# Patient Record
Sex: MALE | Race: WHITE | Employment: OTHER | URBAN - METROPOLITAN AREA
[De-identification: names, ages, dates, MRNs, and addresses within clinical notes are randomized per-mention and may not be internally consistent; named-entity substitution may affect disease eponyms.]

---

## 2019-01-01 ENCOUNTER — HOSPITAL ENCOUNTER (OUTPATIENT)
Facility: CLINIC | Age: 84
Setting detail: OBSERVATION
Discharge: NURSING, REHAB OR LONG-TERM CARE | DRG: 556 | End: 2020-01-03
Attending: EMERGENCY MEDICINE | Admitting: INTERNAL MEDICINE
Payer: COMMERCIAL

## 2019-01-01 ENCOUNTER — OFFICE VISIT (OUTPATIENT)
Dept: CARDIOLOGY | Facility: CLINIC | Age: 84
End: 2019-01-01
Attending: INTERNAL MEDICINE
Payer: COMMERCIAL

## 2019-01-01 ENCOUNTER — APPOINTMENT (OUTPATIENT)
Dept: ULTRASOUND IMAGING | Facility: CLINIC | Age: 84
DRG: 556 | End: 2019-01-01
Attending: NURSE PRACTITIONER
Payer: COMMERCIAL

## 2019-01-01 ENCOUNTER — TELEPHONE (OUTPATIENT)
Dept: CARDIOLOGY | Facility: CLINIC | Age: 84
End: 2019-01-01

## 2019-01-01 ENCOUNTER — APPOINTMENT (OUTPATIENT)
Dept: PHYSICAL THERAPY | Facility: CLINIC | Age: 84
DRG: 308 | End: 2019-01-01
Payer: COMMERCIAL

## 2019-01-01 ENCOUNTER — CARE COORDINATION (OUTPATIENT)
Dept: CARDIOLOGY | Facility: CLINIC | Age: 84
End: 2019-01-01

## 2019-01-01 ENCOUNTER — OFFICE VISIT (OUTPATIENT)
Dept: CARDIOLOGY | Facility: CLINIC | Age: 84
End: 2019-01-01
Payer: COMMERCIAL

## 2019-01-01 ENCOUNTER — HOSPITAL ENCOUNTER (EMERGENCY)
Facility: CLINIC | Age: 84
Discharge: HOME | DRG: 556 | End: 2019-12-29
Attending: EMERGENCY MEDICINE | Admitting: EMERGENCY MEDICINE
Payer: COMMERCIAL

## 2019-01-01 ENCOUNTER — NURSING HOME VISIT (OUTPATIENT)
Dept: GERIATRICS | Facility: CLINIC | Age: 84
End: 2019-01-01
Payer: COMMERCIAL

## 2019-01-01 ENCOUNTER — TRANSFERRED RECORDS (OUTPATIENT)
Dept: HEALTH INFORMATION MANAGEMENT | Facility: CLINIC | Age: 84
End: 2019-01-01

## 2019-01-01 ENCOUNTER — APPOINTMENT (OUTPATIENT)
Dept: MRI IMAGING | Facility: CLINIC | Age: 84
DRG: 556 | End: 2019-01-01
Attending: NURSE PRACTITIONER
Payer: COMMERCIAL

## 2019-01-01 ENCOUNTER — APPOINTMENT (OUTPATIENT)
Dept: GENERAL RADIOLOGY | Facility: CLINIC | Age: 84
DRG: 556 | End: 2019-01-01
Attending: EMERGENCY MEDICINE
Payer: COMMERCIAL

## 2019-01-01 ENCOUNTER — HOSPITAL ENCOUNTER (INPATIENT)
Facility: CLINIC | Age: 84
LOS: 3 days | Discharge: NURSING, REHAB OR LONG-TERM CARE | DRG: 308 | End: 2019-04-14
Attending: EMERGENCY MEDICINE | Admitting: INTERNAL MEDICINE
Payer: COMMERCIAL

## 2019-01-01 ENCOUNTER — APPOINTMENT (OUTPATIENT)
Dept: CARDIOLOGY | Facility: CLINIC | Age: 84
DRG: 308 | End: 2019-01-01
Attending: INTERNAL MEDICINE
Payer: COMMERCIAL

## 2019-01-01 ENCOUNTER — DISCHARGE SUMMARY NURSING HOME (OUTPATIENT)
Dept: GERIATRICS | Facility: CLINIC | Age: 84
End: 2019-01-01
Payer: COMMERCIAL

## 2019-01-01 ENCOUNTER — APPOINTMENT (OUTPATIENT)
Dept: PHYSICAL THERAPY | Facility: CLINIC | Age: 84
DRG: 308 | End: 2019-01-01
Attending: INTERNAL MEDICINE
Payer: COMMERCIAL

## 2019-01-01 ENCOUNTER — APPOINTMENT (OUTPATIENT)
Dept: CT IMAGING | Facility: CLINIC | Age: 84
DRG: 556 | End: 2019-01-01
Attending: EMERGENCY MEDICINE
Payer: COMMERCIAL

## 2019-01-01 ENCOUNTER — APPOINTMENT (OUTPATIENT)
Dept: PHYSICAL THERAPY | Facility: CLINIC | Age: 84
DRG: 556 | End: 2019-01-01
Payer: COMMERCIAL

## 2019-01-01 ENCOUNTER — APPOINTMENT (OUTPATIENT)
Dept: GENERAL RADIOLOGY | Facility: CLINIC | Age: 84
DRG: 308 | End: 2019-01-01
Attending: INTERNAL MEDICINE
Payer: COMMERCIAL

## 2019-01-01 ENCOUNTER — HOSPITAL ENCOUNTER (EMERGENCY)
Facility: CLINIC | Age: 84
Discharge: HOME | DRG: 556 | End: 2019-12-30
Admitting: PHYSICIAN ASSISTANT
Payer: COMMERCIAL

## 2019-01-01 ENCOUNTER — OFFICE VISIT (OUTPATIENT)
Dept: CARDIOLOGY | Facility: CLINIC | Age: 84
End: 2019-01-01
Attending: NURSE PRACTITIONER
Payer: COMMERCIAL

## 2019-01-01 ENCOUNTER — APPOINTMENT (OUTPATIENT)
Dept: OCCUPATIONAL THERAPY | Facility: CLINIC | Age: 84
DRG: 308 | End: 2019-01-01
Attending: INTERNAL MEDICINE
Payer: COMMERCIAL

## 2019-01-01 ENCOUNTER — APPOINTMENT (OUTPATIENT)
Dept: GENERAL RADIOLOGY | Facility: CLINIC | Age: 84
DRG: 308 | End: 2019-01-01
Attending: EMERGENCY MEDICINE
Payer: COMMERCIAL

## 2019-01-01 ENCOUNTER — APPOINTMENT (OUTPATIENT)
Dept: GENERAL RADIOLOGY | Facility: CLINIC | Age: 84
DRG: 556 | End: 2019-01-01
Attending: PHYSICIAN ASSISTANT
Payer: COMMERCIAL

## 2019-01-01 ENCOUNTER — APPOINTMENT (OUTPATIENT)
Dept: OCCUPATIONAL THERAPY | Facility: CLINIC | Age: 84
DRG: 308 | End: 2019-01-01
Payer: COMMERCIAL

## 2019-01-01 ENCOUNTER — HOSPITAL ENCOUNTER (EMERGENCY)
Facility: CLINIC | Age: 84
Discharge: HOME | End: 2019-01-28
Attending: NURSE PRACTITIONER | Admitting: NURSE PRACTITIONER
Payer: COMMERCIAL

## 2019-01-01 VITALS — DIASTOLIC BLOOD PRESSURE: 92 MMHG | OXYGEN SATURATION: 94 % | TEMPERATURE: 98.7 F | SYSTOLIC BLOOD PRESSURE: 138 MMHG

## 2019-01-01 VITALS
DIASTOLIC BLOOD PRESSURE: 64 MMHG | WEIGHT: 183.3 LBS | OXYGEN SATURATION: 98 % | HEIGHT: 70 IN | HEART RATE: 62 BPM | SYSTOLIC BLOOD PRESSURE: 146 MMHG | BODY MASS INDEX: 26.24 KG/M2

## 2019-01-01 VITALS
HEART RATE: 88 BPM | OXYGEN SATURATION: 98 % | TEMPERATURE: 98.1 F | WEIGHT: 196 LBS | DIASTOLIC BLOOD PRESSURE: 85 MMHG | RESPIRATION RATE: 16 BRPM | SYSTOLIC BLOOD PRESSURE: 147 MMHG | BODY MASS INDEX: 28.12 KG/M2

## 2019-01-01 VITALS
HEIGHT: 70 IN | OXYGEN SATURATION: 97 % | DIASTOLIC BLOOD PRESSURE: 78 MMHG | SYSTOLIC BLOOD PRESSURE: 128 MMHG | HEART RATE: 80 BPM | TEMPERATURE: 98.1 F | RESPIRATION RATE: 20 BRPM | BODY MASS INDEX: 27 KG/M2 | WEIGHT: 188.6 LBS

## 2019-01-01 VITALS
SYSTOLIC BLOOD PRESSURE: 119 MMHG | BODY MASS INDEX: 25.72 KG/M2 | HEART RATE: 63 BPM | WEIGHT: 183.7 LBS | HEIGHT: 71 IN | DIASTOLIC BLOOD PRESSURE: 66 MMHG

## 2019-01-01 VITALS
OXYGEN SATURATION: 96 % | WEIGHT: 195 LBS | TEMPERATURE: 97.6 F | SYSTOLIC BLOOD PRESSURE: 131 MMHG | RESPIRATION RATE: 18 BRPM | BODY MASS INDEX: 27.92 KG/M2 | HEART RATE: 53 BPM | HEIGHT: 70 IN | DIASTOLIC BLOOD PRESSURE: 79 MMHG

## 2019-01-01 VITALS
BODY MASS INDEX: 26.73 KG/M2 | SYSTOLIC BLOOD PRESSURE: 113 MMHG | HEART RATE: 59 BPM | TEMPERATURE: 96.7 F | OXYGEN SATURATION: 97 % | WEIGHT: 186.3 LBS | RESPIRATION RATE: 18 BRPM | DIASTOLIC BLOOD PRESSURE: 59 MMHG

## 2019-01-01 VITALS
SYSTOLIC BLOOD PRESSURE: 138 MMHG | DIASTOLIC BLOOD PRESSURE: 76 MMHG | WEIGHT: 188 LBS | OXYGEN SATURATION: 97 % | HEIGHT: 70 IN | HEART RATE: 80 BPM | BODY MASS INDEX: 26.92 KG/M2

## 2019-01-01 VITALS
HEIGHT: 70 IN | SYSTOLIC BLOOD PRESSURE: 120 MMHG | OXYGEN SATURATION: 97 % | DIASTOLIC BLOOD PRESSURE: 72 MMHG | BODY MASS INDEX: 27.35 KG/M2 | WEIGHT: 191 LBS | HEART RATE: 66 BPM

## 2019-01-01 VITALS
BODY MASS INDEX: 26.98 KG/M2 | SYSTOLIC BLOOD PRESSURE: 123 MMHG | TEMPERATURE: 97.7 F | WEIGHT: 188 LBS | OXYGEN SATURATION: 98 % | RESPIRATION RATE: 20 BRPM | HEART RATE: 68 BPM | DIASTOLIC BLOOD PRESSURE: 67 MMHG

## 2019-01-01 VITALS
DIASTOLIC BLOOD PRESSURE: 59 MMHG | HEIGHT: 70 IN | HEART RATE: 60 BPM | WEIGHT: 186 LBS | BODY MASS INDEX: 26.63 KG/M2 | SYSTOLIC BLOOD PRESSURE: 91 MMHG

## 2019-01-01 VITALS — DIASTOLIC BLOOD PRESSURE: 76 MMHG | HEART RATE: 99 BPM | SYSTOLIC BLOOD PRESSURE: 118 MMHG

## 2019-01-01 DIAGNOSIS — M79.672 PAIN IN LEFT FOOT: Primary | ICD-10-CM

## 2019-01-01 DIAGNOSIS — I48.21 PERMANENT ATRIAL FIBRILLATION (H): ICD-10-CM

## 2019-01-01 DIAGNOSIS — I50.30 HEART FAILURE WITH PRESERVED EJECTION FRACTION, UNSPECIFIED HF CHRONICITY (H): ICD-10-CM

## 2019-01-01 DIAGNOSIS — S80.12XA TRAUMATIC ECCHYMOSIS OF LEFT LOWER LEG, INITIAL ENCOUNTER: ICD-10-CM

## 2019-01-01 DIAGNOSIS — I48.91 NEW ONSET ATRIAL FIBRILLATION (H): ICD-10-CM

## 2019-01-01 DIAGNOSIS — I50.30 (HFPEF) HEART FAILURE WITH PRESERVED EJECTION FRACTION (H): Primary | ICD-10-CM

## 2019-01-01 DIAGNOSIS — I35.0 AORTIC VALVE STENOSIS, ETIOLOGY OF CARDIAC VALVE DISEASE UNSPECIFIED: ICD-10-CM

## 2019-01-01 DIAGNOSIS — N39.0 URINARY TRACT INFECTION WITHOUT HEMATURIA, SITE UNSPECIFIED: ICD-10-CM

## 2019-01-01 DIAGNOSIS — S70.12XA HEMATOMA OF LEFT THIGH, INITIAL ENCOUNTER: ICD-10-CM

## 2019-01-01 DIAGNOSIS — E11.8 TYPE 2 DIABETES MELLITUS WITH COMPLICATION, WITHOUT LONG-TERM CURRENT USE OF INSULIN (H): ICD-10-CM

## 2019-01-01 DIAGNOSIS — I50.30 (HFPEF) HEART FAILURE WITH PRESERVED EJECTION FRACTION (H): ICD-10-CM

## 2019-01-01 DIAGNOSIS — I48.19 PERSISTENT ATRIAL FIBRILLATION (H): Primary | ICD-10-CM

## 2019-01-01 DIAGNOSIS — I48.19 PERSISTENT ATRIAL FIBRILLATION (H): ICD-10-CM

## 2019-01-01 DIAGNOSIS — I50.32 CHRONIC HEART FAILURE WITH PRESERVED EJECTION FRACTION (H): Primary | ICD-10-CM

## 2019-01-01 DIAGNOSIS — I63.9 CEREBROVASCULAR ACCIDENT (CVA), UNSPECIFIED MECHANISM (H): ICD-10-CM

## 2019-01-01 DIAGNOSIS — I50.31 ACUTE DIASTOLIC CONGESTIVE HEART FAILURE (H): Primary | ICD-10-CM

## 2019-01-01 DIAGNOSIS — I50.9 CONGESTIVE HEART FAILURE, UNSPECIFIED HF CHRONICITY, UNSPECIFIED HEART FAILURE TYPE (H): ICD-10-CM

## 2019-01-01 DIAGNOSIS — I50.9 CONGESTIVE HEART FAILURE, UNSPECIFIED HF CHRONICITY, UNSPECIFIED HEART FAILURE TYPE (H): Primary | ICD-10-CM

## 2019-01-01 DIAGNOSIS — N40.1 BENIGN PROSTATIC HYPERPLASIA WITH NOCTURIA: Primary | ICD-10-CM

## 2019-01-01 DIAGNOSIS — I35.0 NONRHEUMATIC AORTIC VALVE STENOSIS: Primary | ICD-10-CM

## 2019-01-01 DIAGNOSIS — R31.9 URINARY TRACT INFECTION WITH HEMATURIA, SITE UNSPECIFIED: ICD-10-CM

## 2019-01-01 DIAGNOSIS — E78.5 HYPERLIPIDEMIA, UNSPECIFIED HYPERLIPIDEMIA TYPE: ICD-10-CM

## 2019-01-01 DIAGNOSIS — I48.20 CHRONIC ATRIAL FIBRILLATION (H): ICD-10-CM

## 2019-01-01 DIAGNOSIS — M25.572 PAIN IN JOINT INVOLVING ANKLE AND FOOT, LEFT: ICD-10-CM

## 2019-01-01 DIAGNOSIS — R35.1 BENIGN PROSTATIC HYPERPLASIA WITH NOCTURIA: Primary | ICD-10-CM
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
## 2019-01-01 DIAGNOSIS — S61.211A LACERATION OF LEFT INDEX FINGER WITHOUT FOREIGN BODY WITHOUT DAMAGE TO NAIL, INITIAL ENCOUNTER: ICD-10-CM

## 2019-01-01 DIAGNOSIS — R53.81 PHYSICAL DECONDITIONING: ICD-10-CM

## 2019-01-01 DIAGNOSIS — I10 ESSENTIAL HYPERTENSION: ICD-10-CM

## 2019-01-01 DIAGNOSIS — I50.31 ACUTE DIASTOLIC CONGESTIVE HEART FAILURE (H): ICD-10-CM

## 2019-01-01 DIAGNOSIS — S90.32XA CONTUSION OF LEFT FOOT, INITIAL ENCOUNTER: ICD-10-CM

## 2019-01-01 DIAGNOSIS — N39.0 URINARY TRACT INFECTION WITH HEMATURIA, SITE UNSPECIFIED: ICD-10-CM

## 2019-01-01 DIAGNOSIS — I48.91 ATRIAL FIBRILLATION, NEW ONSET (H): ICD-10-CM

## 2019-01-01 DIAGNOSIS — S70.12XA TRAUMATIC ECCHYMOSIS OF LEFT THIGH, INITIAL ENCOUNTER: ICD-10-CM

## 2019-01-01 DIAGNOSIS — M79.672 LEFT FOOT PAIN: ICD-10-CM

## 2019-01-01 DIAGNOSIS — E11.00 TYPE 2 DIABETES MELLITUS WITH HYPEROSMOLARITY WITHOUT COMA, WITHOUT LONG-TERM CURRENT USE OF INSULIN (H): ICD-10-CM

## 2019-01-01 LAB
ABO + RH BLD: NORMAL
ABO + RH BLD: NORMAL
ALBUMIN SERPL-MCNC: 3.8 G/DL (ref 3.5–5)
ALBUMIN UR-MCNC: 100 MG/DL
ALP SERPL-CCNC: 94 U/L (ref 40–150)
ALT SERPL-CCNC: 12 U/L (ref 0–55)
ANION GAP SERPL CALCULATED.3IONS-SCNC: 10 MMOL/L (ref 3–14)
ANION GAP SERPL CALCULATED.3IONS-SCNC: 10 MMOL/L (ref 6–17)
ANION GAP SERPL CALCULATED.3IONS-SCNC: 11 MMOL/L (ref 3–14)
ANION GAP SERPL CALCULATED.3IONS-SCNC: 13 MMOL/L (ref 7–16)
ANION GAP SERPL CALCULATED.3IONS-SCNC: 14.4 MMOL/L (ref 6–17)
ANION GAP SERPL CALCULATED.3IONS-SCNC: 15 MMOL/L (ref 6–17)
ANION GAP SERPL CALCULATED.3IONS-SCNC: 15 MMOL/L (ref 7–16)
ANION GAP SERPL CALCULATED.3IONS-SCNC: 15.8 MMOL/L (ref 6–17)
ANION GAP SERPL CALCULATED.3IONS-SCNC: 6 MMOL/L (ref 3–14)
ANION GAP SERPL CALCULATED.3IONS-SCNC: 6 MMOL/L (ref 3–14)
ANION GAP SERPL CALCULATED.3IONS-SCNC: 7 MMOL/L (ref 3–14)
ANION GAP SERPL CALCULATED.3IONS-SCNC: 8 MMOL/L (ref 3–14)
ANION GAP SERPL CALCULATED.3IONS-SCNC: 9 MMOL/L (ref 3–14)
APPEARANCE UR: ABNORMAL
AST SERPL-CCNC: 12 U/L (ref 10–40)
BACTERIA #/AREA URNS HPF: ABNORMAL /HPF
BACTERIA SPEC CULT: ABNORMAL
BACTERIA SPEC CULT: ABNORMAL
BASOPHILS # BLD AUTO: 0 10E9/L (ref 0–0.2)
BASOPHILS # BLD AUTO: 0.1 10E9/L (ref 0–0.2)
BASOPHILS NFR BLD AUTO: 0.1 %
BASOPHILS NFR BLD AUTO: 0.3 %
BASOPHILS NFR BLD AUTO: 0.5 %
BASOPHILS NFR BLD AUTO: 0.9 %
BILIRUB SERPL-MCNC: 0.7 MG/DL (ref 0.2–1.2)
BILIRUB UR QL STRIP: NEGATIVE
BLD GP AB SCN SERPL QL: NORMAL
BLOOD BANK CMNT PATIENT-IMP: NORMAL
BUN SERPL-MCNC: 23 MG/DL (ref 7–30)
BUN SERPL-MCNC: 24 MG/DL (ref 7–30)
BUN SERPL-MCNC: 25 MG/DL (ref 7–30)
BUN SERPL-MCNC: 25 MG/DL (ref 7–30)
BUN SERPL-MCNC: 26 MG/DL (ref 7–30)
BUN SERPL-MCNC: 27 MG/DL (ref 7–30)
BUN SERPL-MCNC: 28 MG/DL (ref 7–30)
BUN SERPL-MCNC: 31 MG/DL (ref 7–26)
BUN SERPL-MCNC: 31 MG/DL (ref 7–30)
BUN SERPL-MCNC: 32 MG/DL (ref 7–26)
BUN SERPL-MCNC: 35 MG/DL (ref 7–30)
BUN SERPL-MCNC: 36 MG/DL (ref 7–30)
BUN SERPL-MCNC: 44 MG/DL (ref 7–30)
CALCIUM SERPL-MCNC: 10 MG/DL (ref 8.5–10.5)
CALCIUM SERPL-MCNC: 8.3 MG/DL (ref 8.5–10.1)
CALCIUM SERPL-MCNC: 8.3 MG/DL (ref 8.5–10.1)
CALCIUM SERPL-MCNC: 8.6 MG/DL (ref 8.5–10.1)
CALCIUM SERPL-MCNC: 8.9 MG/DL (ref 8.5–10.1)
CALCIUM SERPL-MCNC: 8.9 MG/DL (ref 8.5–10.1)
CALCIUM SERPL-MCNC: 9.1 MG/DL (ref 8.5–10.1)
CALCIUM SERPL-MCNC: 9.4 MG/DL (ref 8.5–10.1)
CALCIUM SERPL-MCNC: 9.4 MG/DL (ref 8.5–10.5)
CALCIUM SERPL-MCNC: 9.5 MG/DL (ref 8.5–10.1)
CALCIUM SERPL-MCNC: 9.6 MG/DL (ref 8.5–10.1)
CALCIUM SERPL-MCNC: 9.7 MG/DL (ref 8.4–10.4)
CALCIUM SERPL-MCNC: 9.9 MG/DL (ref 8.4–10.4)
CHLORIDE SERPL-SCNC: 102 MMOL/L (ref 98–107)
CHLORIDE SERPL-SCNC: 102 MMOL/L (ref 98–107)
CHLORIDE SERPL-SCNC: 105 MMOL/L (ref 94–109)
CHLORIDE SERPL-SCNC: 105 MMOL/L (ref 94–109)
CHLORIDE SERPL-SCNC: 106 MMOL/L (ref 94–109)
CHLORIDE SERPL-SCNC: 107 MMOL/L (ref 94–109)
CHLORIDE SERPL-SCNC: 108 MMOL/L (ref 94–109)
CHLORIDE SERPL-SCNC: 108 MMOL/L (ref 94–109)
CHLORIDE SERPL-SCNC: 109 MMOL/L (ref 94–109)
CHLORIDE SERPL-SCNC: 110 MMOL/L (ref 94–109)
CHLORIDE SERPL-SCNC: 112 MMOL/L (ref 94–109)
CHLORIDE SERPL-SCNC: 98 MMOL/L (ref 98–107)
CHLORIDE SERPL-SCNC: 98 MMOL/L (ref 98–107)
CHLORIDE SERPLBLD-SCNC: 106 MMOL/L (ref 98–109)
CHLORIDE SERPLBLD-SCNC: 98 MMOL/L (ref 98–109)
CK SERPL-CCNC: 1080 U/L (ref 30–300)
CK SERPL-CCNC: 77 U/L (ref 30–300)
CO2 SERPL-SCNC: 21 MMOL/L (ref 20–32)
CO2 SERPL-SCNC: 22 MMOL/L (ref 20–29)
CO2 SERPL-SCNC: 23 MMOL/L (ref 20–32)
CO2 SERPL-SCNC: 24 MMOL/L (ref 20–32)
CO2 SERPL-SCNC: 25 MMOL/L (ref 20–32)
CO2 SERPL-SCNC: 25 MMOL/L (ref 23–29)
CO2 SERPL-SCNC: 27 MMOL/L (ref 23–29)
CO2 SERPL-SCNC: 27 MMOL/L (ref 23–29)
CO2 SERPL-SCNC: 28 MMOL/L (ref 20–32)
CO2 SERPL-SCNC: 28 MMOL/L (ref 20–32)
CO2 SERPL-SCNC: 29 MMOL/L (ref 20–32)
CO2 SERPL-SCNC: 30 MMOL/L (ref 20–29)
CO2 SERPL-SCNC: 33 MMOL/L (ref 23–29)
COLOR UR AUTO: YELLOW
CREAT SERPL-MCNC: 0.99 MG/DL (ref 0.66–1.25)
CREAT SERPL-MCNC: 1.03 MG/DL (ref 0.66–1.25)
CREAT SERPL-MCNC: 1.07 MG/DL (ref 0.66–1.25)
CREAT SERPL-MCNC: 1.07 MG/DL (ref 0.66–1.25)
CREAT SERPL-MCNC: 1.08 MG/DL (ref 0.73–1.18)
CREAT SERPL-MCNC: 1.16 MG/DL (ref 0.66–1.25)
CREAT SERPL-MCNC: 1.2 MG/DL (ref 0.66–1.25)
CREAT SERPL-MCNC: 1.2 MG/DL (ref 0.66–1.25)
CREAT SERPL-MCNC: 1.26 MG/DL (ref 0.66–1.25)
CREAT SERPL-MCNC: 1.29 MG/DL (ref 0.7–1.3)
CREAT SERPL-MCNC: 1.29 MG/DL (ref 0.7–1.3)
CREAT SERPL-MCNC: 1.33 MG/DL (ref 0.73–1.18)
CREAT SERPL-MCNC: 1.37 MG/DL (ref 0.66–1.25)
CREAT SERPL-MCNC: 1.41 MG/DL (ref 0.7–1.3)
CREAT SERPL-MCNC: 1.56 MG/DL (ref 0.7–1.3)
DIFFERENTIAL METHOD BLD: ABNORMAL
DIFFERENTIAL: ABNORMAL
EOSINOPHIL # BLD AUTO: 0 10E9/L (ref 0–0.7)
EOSINOPHIL # BLD AUTO: 0 10E9/L (ref 0–0.7)
EOSINOPHIL # BLD AUTO: 0.1 10E9/L (ref 0–0.7)
EOSINOPHIL # BLD AUTO: 0.2 10E9/L (ref 0–0.7)
EOSINOPHIL NFR BLD AUTO: 0 %
EOSINOPHIL NFR BLD AUTO: 0.1 %
EOSINOPHIL NFR BLD AUTO: 1.8 %
EOSINOPHIL NFR BLD AUTO: 2.9 %
ERYTHROCYTE [DISTWIDTH] IN BLOOD BY AUTOMATED COUNT: 14.5 % (ref 10–15)
ERYTHROCYTE [DISTWIDTH] IN BLOOD BY AUTOMATED COUNT: 14.8 % (ref 11.9–15.5)
ERYTHROCYTE [DISTWIDTH] IN BLOOD BY AUTOMATED COUNT: 15.5 % (ref 10–15)
ERYTHROCYTE [DISTWIDTH] IN BLOOD BY AUTOMATED COUNT: 16 % (ref 10–15)
ERYTHROCYTE [DISTWIDTH] IN BLOOD BY AUTOMATED COUNT: 16.4 % (ref 10–15)
GFR SERPL CREATININE-BSD FRML MDRD: 42 ML/MIN/{1.73_M2}
GFR SERPL CREATININE-BSD FRML MDRD: 46 ML/MIN/{1.73_M2}
GFR SERPL CREATININE-BSD FRML MDRD: 48 ML/MIN/1.73M2
GFR SERPL CREATININE-BSD FRML MDRD: 48 ML/MIN/{1.73_M2}
GFR SERPL CREATININE-BSD FRML MDRD: 51 ML/MIN/{1.73_M2}
GFR SERPL CREATININE-BSD FRML MDRD: 53 ML/MIN/{1.73_M2}
GFR SERPL CREATININE-BSD FRML MDRD: 53 ML/MIN/{1.73_M2}
GFR SERPL CREATININE-BSD FRML MDRD: 54 ML/MIN/{1.73_M2}
GFR SERPL CREATININE-BSD FRML MDRD: 54 ML/MIN/{1.73_M2}
GFR SERPL CREATININE-BSD FRML MDRD: 57 ML/MIN/{1.73_M2}
GFR SERPL CREATININE-BSD FRML MDRD: 62 ML/MIN/{1.73_M2}
GFR SERPL CREATININE-BSD FRML MDRD: 62 ML/MIN/{1.73_M2}
GFR SERPL CREATININE-BSD FRML MDRD: 65 ML/MIN/{1.73_M2}
GFR SERPL CREATININE-BSD FRML MDRD: 69 ML/MIN/{1.73_M2}
GFR SERPL CREATININE-BSD FRML MDRD: >60 ML/MIN/1.73M2
GLUCOSE BLDC GLUCOMTR-MCNC: 122 MG/DL (ref 70–99)
GLUCOSE BLDC GLUCOMTR-MCNC: 125 MG/DL (ref 70–99)
GLUCOSE BLDC GLUCOMTR-MCNC: 133 MG/DL (ref 70–99)
GLUCOSE BLDC GLUCOMTR-MCNC: 141 MG/DL (ref 70–99)
GLUCOSE BLDC GLUCOMTR-MCNC: 148 MG/DL (ref 70–99)
GLUCOSE BLDC GLUCOMTR-MCNC: 152 MG/DL (ref 70–99)
GLUCOSE BLDC GLUCOMTR-MCNC: 152 MG/DL (ref 70–99)
GLUCOSE BLDC GLUCOMTR-MCNC: 157 MG/DL (ref 70–99)
GLUCOSE BLDC GLUCOMTR-MCNC: 163 MG/DL (ref 70–99)
GLUCOSE BLDC GLUCOMTR-MCNC: 175 MG/DL (ref 70–99)
GLUCOSE BLDC GLUCOMTR-MCNC: 179 MG/DL (ref 70–99)
GLUCOSE BLDC GLUCOMTR-MCNC: 182 MG/DL (ref 70–99)
GLUCOSE BLDC GLUCOMTR-MCNC: 214 MG/DL (ref 70–99)
GLUCOSE BLDC GLUCOMTR-MCNC: 287 MG/DL (ref 70–99)
GLUCOSE SERPL-MCNC: 103 MG/DL (ref 70–99)
GLUCOSE SERPL-MCNC: 113 MG/DL (ref 70–100)
GLUCOSE SERPL-MCNC: 115 MG/DL (ref 70–105)
GLUCOSE SERPL-MCNC: 117 MG/DL (ref 70–99)
GLUCOSE SERPL-MCNC: 122 MG/DL (ref 70–105)
GLUCOSE SERPL-MCNC: 135 MG/DL (ref 70–100)
GLUCOSE SERPL-MCNC: 143 MG/DL (ref 70–99)
GLUCOSE SERPL-MCNC: 147 MG/DL (ref 70–99)
GLUCOSE SERPL-MCNC: 152 MG/DL (ref 70–99)
GLUCOSE SERPL-MCNC: 152 MG/DL (ref 70–99)
GLUCOSE SERPL-MCNC: 160 MG/DL (ref 70–99)
GLUCOSE SERPL-MCNC: 165 MG/DL (ref 70–99)
GLUCOSE SERPL-MCNC: 167 MG/DL (ref 70–99)
GLUCOSE SERPL-MCNC: 217 MG/DL (ref 70–105)
GLUCOSE SERPL-MCNC: 281 MG/DL (ref 70–105)
GLUCOSE UR STRIP-MCNC: NEGATIVE MG/DL
HBA1C MFR BLD: 6.3 % (ref 0–5.7)
HBA1C MFR BLD: 6.6 % (ref 0–5.6)
HBA1C MFR BLD: 8.3 % (ref 0–5.6)
HCT VFR BLD AUTO: 31.8 % (ref 40–53)
HCT VFR BLD AUTO: 32.4 % (ref 40–53)
HCT VFR BLD AUTO: 36.3 % (ref 40–53)
HCT VFR BLD AUTO: 36.9 % (ref 38.8–50)
HCT VFR BLD AUTO: 38.4 % (ref 40–53)
HEMOGLOBIN: 11.7 G/DL (ref 13.5–17.5)
HGB BLD-MCNC: 10.1 G/DL (ref 13.3–17.7)
HGB BLD-MCNC: 10.5 G/DL (ref 13.3–17.7)
HGB BLD-MCNC: 11.9 G/DL (ref 13.3–17.7)
HGB BLD-MCNC: 12.8 G/DL (ref 13.3–17.7)
HGB UR QL STRIP: ABNORMAL
IMM GRANULOCYTES # BLD: 0 10E9/L (ref 0–0.4)
IMM GRANULOCYTES # BLD: 0.1 10E9/L (ref 0–0.4)
IMM GRANULOCYTES NFR BLD: 0 %
IMM GRANULOCYTES NFR BLD: 0.2 %
IMM GRANULOCYTES NFR BLD: 0.2 %
IMM GRANULOCYTES NFR BLD: 0.3 %
INR PPP: 1.6 (ref 0.86–1.14)
INTERPRETATION ECG - MUSE: NORMAL
KETONES UR STRIP-MCNC: NEGATIVE MG/DL
LEUKOCYTE ESTERASE UR QL STRIP: ABNORMAL
LYMPHOCYTES # BLD AUTO: 0.3 10E9/L (ref 0.8–5.3)
LYMPHOCYTES # BLD AUTO: 0.8 10E9/L (ref 0.8–5.3)
LYMPHOCYTES # BLD AUTO: 0.8 10E9/L (ref 0.8–5.3)
LYMPHOCYTES # BLD AUTO: 0.9 10E9/L (ref 0.8–5.3)
LYMPHOCYTES NFR BLD AUTO: 1.4 %
LYMPHOCYTES NFR BLD AUTO: 13.2 %
LYMPHOCYTES NFR BLD AUTO: 16 %
LYMPHOCYTES NFR BLD AUTO: 6.9 %
Lab: ABNORMAL
MCH RBC QN AUTO: 30.2 PG (ref 26.5–33)
MCH RBC QN AUTO: 30.4 PG (ref 26.5–33)
MCH RBC QN AUTO: 31 PG (ref 26.5–33)
MCH RBC QN AUTO: 31.6 PG (ref 26.5–33)
MCH RBC QN AUTO: 31.7 PG (ref 27.6–33.3)
MCHC RBC AUTO-ENTMCNC: 31.7 G/DL (ref 31.5–35.2)
MCHC RBC AUTO-ENTMCNC: 31.8 G/DL (ref 31.5–36.5)
MCHC RBC AUTO-ENTMCNC: 32.4 G/DL (ref 31.5–36.5)
MCHC RBC AUTO-ENTMCNC: 32.8 G/DL (ref 31.5–36.5)
MCHC RBC AUTO-ENTMCNC: 33.3 G/DL (ref 31.5–36.5)
MCV RBC AUTO: 100 FL (ref 80–100)
MCV RBC AUTO: 91 FL (ref 78–100)
MCV RBC AUTO: 92 FL (ref 78–100)
MCV RBC AUTO: 98 FL (ref 78–100)
MCV RBC AUTO: 98 FL (ref 78–100)
MONOCYTES # BLD AUTO: 0.5 10E9/L (ref 0–1.3)
MONOCYTES # BLD AUTO: 0.6 10E9/L (ref 0–1.3)
MONOCYTES # BLD AUTO: 1 10E9/L (ref 0–1.3)
MONOCYTES # BLD AUTO: 1.1 10E9/L (ref 0–1.3)
MONOCYTES NFR BLD AUTO: 10 %
MONOCYTES NFR BLD AUTO: 10.2 %
MONOCYTES NFR BLD AUTO: 5.1 %
MONOCYTES NFR BLD AUTO: 8.1 %
NEUTROPHILS # BLD AUTO: 18.9 10E9/L (ref 1.6–8.3)
NEUTROPHILS # BLD AUTO: 4.1 10E9/L (ref 1.6–8.3)
NEUTROPHILS # BLD AUTO: 4.7 10E9/L (ref 1.6–8.3)
NEUTROPHILS # BLD AUTO: 9.2 10E9/L (ref 1.6–8.3)
NEUTROPHILS NFR BLD AUTO: 69.8 %
NEUTROPHILS NFR BLD AUTO: 76.4 %
NEUTROPHILS NFR BLD AUTO: 82.5 %
NEUTROPHILS NFR BLD AUTO: 93.1 %
NITRATE UR QL: POSITIVE
NRBC # BLD AUTO: 0 10*3/UL
NRBC BLD AUTO-RTO: 0 /100
NT-PROBNP SERPL-MCNC: 4987 PG/ML (ref 0–450)
NT-PROBNP SERPL-MCNC: ABNORMAL PG/ML (ref 0–1800)
PH UR STRIP: 5.5 PH (ref 5–7)
PLATELET # BLD AUTO: 161 10E9/L (ref 150–450)
PLATELET # BLD AUTO: 173 X10(9)/L (ref 150–450)
PLATELET # BLD AUTO: 178 10E9/L (ref 150–450)
PLATELET # BLD AUTO: 206 10E9/L (ref 150–450)
PLATELET # BLD AUTO: 215 10E9/L (ref 150–450)
PLATELET # BLD AUTO: 225 10E9/L (ref 150–450)
POTASSIUM SERPL-SCNC: 3.1 MMOL/L (ref 3.4–5.3)
POTASSIUM SERPL-SCNC: 3.5 MMOL/L (ref 3.4–5.3)
POTASSIUM SERPL-SCNC: 3.9 MMOL/L (ref 3.4–5.3)
POTASSIUM SERPL-SCNC: 4 MMOL/L (ref 3.5–5)
POTASSIUM SERPL-SCNC: 4 MMOL/L (ref 3.5–5.1)
POTASSIUM SERPL-SCNC: 4 MMOL/L (ref 3.5–5.1)
POTASSIUM SERPL-SCNC: 4.2 MMOL/L (ref 3.5–5.1)
POTASSIUM SERPL-SCNC: 4.3 MMOL/L (ref 3.4–5.3)
POTASSIUM SERPL-SCNC: 4.4 MMOL/L (ref 3.4–5.3)
POTASSIUM SERPL-SCNC: 4.4 MMOL/L (ref 3.4–5.3)
POTASSIUM SERPL-SCNC: 4.4 MMOL/L (ref 3.5–5.1)
POTASSIUM SERPL-SCNC: 4.5 MMOL/L (ref 3.4–5.3)
POTASSIUM SERPL-SCNC: 4.6 MMOL/L (ref 3.4–5.3)
POTASSIUM SERPL-SCNC: 4.6 MMOL/L (ref 3.4–5.3)
POTASSIUM SERPL-SCNC: 4.8 MMOL/L (ref 3.5–5.1)
PROT SERPL-MCNC: 6.1 G/DL (ref 6.4–8.3)
RBC # BLD AUTO: 3.26 10E12/L (ref 4.4–5.9)
RBC # BLD AUTO: 3.32 10E12/L (ref 4.4–5.9)
RBC # BLD AUTO: 3.69 (ref 4.32–5.72)
RBC # BLD AUTO: 3.94 10E12/L (ref 4.4–5.9)
RBC # BLD AUTO: 4.21 10E12/L (ref 4.4–5.9)
RBC #/AREA URNS AUTO: 11 /HPF (ref 0–2)
SODIUM SERPL-SCNC: 136 MMOL/L (ref 133–144)
SODIUM SERPL-SCNC: 136 MMOL/L (ref 136–145)
SODIUM SERPL-SCNC: 137 MMOL/L (ref 136–145)
SODIUM SERPL-SCNC: 138 MMOL/L (ref 136–145)
SODIUM SERPL-SCNC: 139 MMOL/L (ref 136–145)
SODIUM SERPL-SCNC: 140 MMOL/L (ref 133–144)
SODIUM SERPL-SCNC: 141 MMOL/L (ref 133–144)
SODIUM SERPL-SCNC: 141 MMOL/L (ref 136–145)
SODIUM SERPL-SCNC: 142 MMOL/L (ref 133–144)
SODIUM SERPL-SCNC: 142 MMOL/L (ref 133–144)
SODIUM SERPL-SCNC: 143 MMOL/L (ref 133–144)
SODIUM SERPL-SCNC: 143 MMOL/L (ref 133–144)
SODIUM SERPL-SCNC: 143 MMOL/L (ref 136–145)
SOURCE: ABNORMAL
SP GR UR STRIP: 1.01 (ref 1–1.03)
SPECIMEN EXP DATE BLD: NORMAL
SPECIMEN SOURCE: ABNORMAL
SQUAMOUS #/AREA URNS AUTO: <1 /HPF (ref 0–1)
TROPONIN I SERPL-MCNC: 0.09 UG/L (ref 0–0.04)
TROPONIN I SERPL-MCNC: 0.1 UG/L (ref 0–0.04)
TROPONIN I SERPL-MCNC: 0.11 UG/L (ref 0–0.04)
TROPONIN I SERPL-MCNC: 0.14 UG/L (ref 0–0.04)
TSH SERPL DL<=0.005 MIU/L-ACNC: 0.4 MU/L (ref 0.4–4)
UROBILINOGEN UR STRIP-MCNC: NORMAL MG/DL (ref 0–2)
WBC # BLD AUTO: 11.2 10E9/L (ref 4–11)
WBC # BLD AUTO: 20.3 10E9/L (ref 4–11)
WBC # BLD AUTO: 5 X10(9)/L (ref 3.5–10.5)
WBC # BLD AUTO: 5.8 10E9/L (ref 4–11)
WBC # BLD AUTO: 6.1 10E9/L (ref 4–11)
WBC #/AREA URNS AUTO: >182 /HPF (ref 0–5)
WBC CLUMPS #/AREA URNS HPF: PRESENT /HPF

## 2019-01-01 PROCEDURE — 00000146 ZZHCL STATISTIC GLUCOSE BY METER IP

## 2019-01-01 PROCEDURE — 97535 SELF CARE MNGMENT TRAINING: CPT | Mod: GO

## 2019-01-01 PROCEDURE — 86850 RBC ANTIBODY SCREEN: CPT | Performed by: EMERGENCY MEDICINE

## 2019-01-01 PROCEDURE — 97530 THERAPEUTIC ACTIVITIES: CPT | Mod: GO

## 2019-01-01 PROCEDURE — 99285 EMERGENCY DEPT VISIT HI MDM: CPT | Mod: 25

## 2019-01-01 PROCEDURE — 25000132 ZZH RX MED GY IP 250 OP 250 PS 637: Performed by: INTERNAL MEDICINE

## 2019-01-01 PROCEDURE — 99222 1ST HOSP IP/OBS MODERATE 55: CPT | Mod: 25 | Performed by: INTERNAL MEDICINE

## 2019-01-01 PROCEDURE — 21000001 ZZH R&B HEART CARE

## 2019-01-01 PROCEDURE — 36415 COLL VENOUS BLD VENIPUNCTURE: CPT | Performed by: NURSE PRACTITIONER

## 2019-01-01 PROCEDURE — 36415 COLL VENOUS BLD VENIPUNCTURE: CPT | Performed by: INTERNAL MEDICINE

## 2019-01-01 PROCEDURE — 73700 CT LOWER EXTREMITY W/O DYE: CPT | Mod: LT,XS

## 2019-01-01 PROCEDURE — 84484 ASSAY OF TROPONIN QUANT: CPT | Performed by: INTERNAL MEDICINE

## 2019-01-01 PROCEDURE — 85025 COMPLETE CBC W/AUTO DIFF WBC: CPT | Performed by: NURSE PRACTITIONER

## 2019-01-01 PROCEDURE — 80048 BASIC METABOLIC PNL TOTAL CA: CPT | Performed by: INTERNAL MEDICINE

## 2019-01-01 PROCEDURE — 73610 X-RAY EXAM OF ANKLE: CPT | Mod: LT

## 2019-01-01 PROCEDURE — 97162 PT EVAL MOD COMPLEX 30 MIN: CPT | Mod: GP

## 2019-01-01 PROCEDURE — 71046 X-RAY EXAM CHEST 2 VIEWS: CPT

## 2019-01-01 PROCEDURE — 80048 BASIC METABOLIC PNL TOTAL CA: CPT | Performed by: NURSE PRACTITIONER

## 2019-01-01 PROCEDURE — 82550 ASSAY OF CK (CPK): CPT | Performed by: EMERGENCY MEDICINE

## 2019-01-01 PROCEDURE — 25000128 H RX IP 250 OP 636: Performed by: EMERGENCY MEDICINE

## 2019-01-01 PROCEDURE — 25000131 ZZH RX MED GY IP 250 OP 636 PS 637: Performed by: INTERNAL MEDICINE

## 2019-01-01 PROCEDURE — 93306 TTE W/DOPPLER COMPLETE: CPT | Mod: 26 | Performed by: INTERNAL MEDICINE

## 2019-01-01 PROCEDURE — 99233 SBSQ HOSP IP/OBS HIGH 50: CPT | Performed by: INTERNAL MEDICINE

## 2019-01-01 PROCEDURE — 99207 ZZC NO CHARGE LOS: CPT | Performed by: INTERNAL MEDICINE

## 2019-01-01 PROCEDURE — 93005 ELECTROCARDIOGRAM TRACING: CPT

## 2019-01-01 PROCEDURE — 93226 XTRNL ECG REC<48 HR SCAN A/R: CPT

## 2019-01-01 PROCEDURE — 80048 BASIC METABOLIC PNL TOTAL CA: CPT | Performed by: EMERGENCY MEDICINE

## 2019-01-01 PROCEDURE — 99316 NF DSCHRG MGMT 30 MIN+: CPT | Performed by: NURSE PRACTITIONER

## 2019-01-01 PROCEDURE — 96376 TX/PRO/DX INJ SAME DRUG ADON: CPT

## 2019-01-01 PROCEDURE — 99214 OFFICE O/P EST MOD 30 MIN: CPT | Performed by: INTERNAL MEDICINE

## 2019-01-01 PROCEDURE — 85025 COMPLETE CBC W/AUTO DIFF WBC: CPT | Performed by: EMERGENCY MEDICINE

## 2019-01-01 PROCEDURE — 96374 THER/PROPH/DIAG INJ IV PUSH: CPT

## 2019-01-01 PROCEDURE — 97116 GAIT TRAINING THERAPY: CPT | Mod: GP

## 2019-01-01 PROCEDURE — 73718 MRI LOWER EXTREMITY W/O DYE: CPT | Mod: LT

## 2019-01-01 PROCEDURE — 25800030 ZZH RX IP 258 OP 636: Performed by: INTERNAL MEDICINE

## 2019-01-01 PROCEDURE — 73590 X-RAY EXAM OF LOWER LEG: CPT | Mod: LT

## 2019-01-01 PROCEDURE — 25000128 H RX IP 250 OP 636: Performed by: INTERNAL MEDICINE

## 2019-01-01 PROCEDURE — 73630 X-RAY EXAM OF FOOT: CPT | Mod: LT

## 2019-01-01 PROCEDURE — 99223 1ST HOSP IP/OBS HIGH 75: CPT | Mod: AI | Performed by: NURSE PRACTITIONER

## 2019-01-01 PROCEDURE — 87186 SC STD MICRODIL/AGAR DIL: CPT | Performed by: EMERGENCY MEDICINE

## 2019-01-01 PROCEDURE — 12000000 ZZH R&B MED SURG/OB

## 2019-01-01 PROCEDURE — 87086 URINE CULTURE/COLONY COUNT: CPT | Performed by: EMERGENCY MEDICINE

## 2019-01-01 PROCEDURE — 99283 EMERGENCY DEPT VISIT LOW MDM: CPT

## 2019-01-01 PROCEDURE — 99310 SBSQ NF CARE HIGH MDM 45: CPT | Performed by: NURSE PRACTITIONER

## 2019-01-01 PROCEDURE — 83036 HEMOGLOBIN GLYCOSYLATED A1C: CPT | Performed by: EMERGENCY MEDICINE

## 2019-01-01 PROCEDURE — 71045 X-RAY EXAM CHEST 1 VIEW: CPT

## 2019-01-01 PROCEDURE — 81001 URINALYSIS AUTO W/SCOPE: CPT | Performed by: EMERGENCY MEDICINE

## 2019-01-01 PROCEDURE — 25000125 ZZHC RX 250: Performed by: INTERNAL MEDICINE

## 2019-01-01 PROCEDURE — 97165 OT EVAL LOW COMPLEX 30 MIN: CPT | Mod: GO

## 2019-01-01 PROCEDURE — 83880 ASSAY OF NATRIURETIC PEPTIDE: CPT | Performed by: NURSE PRACTITIONER

## 2019-01-01 PROCEDURE — 40000264 ECHOCARDIOGRAM COMPLETE

## 2019-01-01 PROCEDURE — 85049 AUTOMATED PLATELET COUNT: CPT | Performed by: INTERNAL MEDICINE

## 2019-01-01 PROCEDURE — 99214 OFFICE O/P EST MOD 30 MIN: CPT | Performed by: NURSE PRACTITIONER

## 2019-01-01 PROCEDURE — 87088 URINE BACTERIA CULTURE: CPT | Performed by: EMERGENCY MEDICINE

## 2019-01-01 PROCEDURE — 97161 PT EVAL LOW COMPLEX 20 MIN: CPT | Mod: GP

## 2019-01-01 PROCEDURE — 84443 ASSAY THYROID STIM HORMONE: CPT | Performed by: EMERGENCY MEDICINE

## 2019-01-01 PROCEDURE — 73502 X-RAY EXAM HIP UNI 2-3 VIEWS: CPT

## 2019-01-01 PROCEDURE — G0378 HOSPITAL OBSERVATION PER HR: HCPCS

## 2019-01-01 PROCEDURE — 12002 RPR S/N/AX/GEN/TRNK2.6-7.5CM: CPT

## 2019-01-01 PROCEDURE — 86901 BLOOD TYPING SEROLOGIC RH(D): CPT | Performed by: EMERGENCY MEDICINE

## 2019-01-01 PROCEDURE — 73552 X-RAY EXAM OF FEMUR 2/>: CPT | Mod: LT

## 2019-01-01 PROCEDURE — 25000132 ZZH RX MED GY IP 250 OP 250 PS 637: Performed by: PHYSICIAN ASSISTANT

## 2019-01-01 PROCEDURE — 25800030 ZZH RX IP 258 OP 636: Performed by: EMERGENCY MEDICINE

## 2019-01-01 PROCEDURE — 84484 ASSAY OF TROPONIN QUANT: CPT | Performed by: EMERGENCY MEDICINE

## 2019-01-01 PROCEDURE — 83880 ASSAY OF NATRIURETIC PEPTIDE: CPT | Performed by: EMERGENCY MEDICINE

## 2019-01-01 PROCEDURE — 99223 1ST HOSP IP/OBS HIGH 75: CPT | Mod: AI | Performed by: INTERNAL MEDICINE

## 2019-01-01 PROCEDURE — 97530 THERAPEUTIC ACTIVITIES: CPT | Mod: GP

## 2019-01-01 PROCEDURE — 93970 EXTREMITY STUDY: CPT

## 2019-01-01 PROCEDURE — 96375 TX/PRO/DX INJ NEW DRUG ADDON: CPT

## 2019-01-01 PROCEDURE — 73700 CT LOWER EXTREMITY W/O DYE: CPT | Mod: LT

## 2019-01-01 PROCEDURE — 25000128 H RX IP 250 OP 636: Performed by: PHYSICIAN ASSISTANT

## 2019-01-01 PROCEDURE — 82550 ASSAY OF CK (CPK): CPT | Performed by: NURSE PRACTITIONER

## 2019-01-01 PROCEDURE — 86900 BLOOD TYPING SEROLOGIC ABO: CPT | Performed by: EMERGENCY MEDICINE

## 2019-01-01 PROCEDURE — 99239 HOSP IP/OBS DSCHRG MGMT >30: CPT | Performed by: INTERNAL MEDICINE

## 2019-01-01 PROCEDURE — 96361 HYDRATE IV INFUSION ADD-ON: CPT

## 2019-01-01 PROCEDURE — 93227 XTRNL ECG REC<48 HR R&I: CPT | Performed by: INTERNAL MEDICINE

## 2019-01-01 PROCEDURE — 97110 THERAPEUTIC EXERCISES: CPT | Mod: GP

## 2019-01-01 PROCEDURE — 25000132 ZZH RX MED GY IP 250 OP 250 PS 637: Performed by: EMERGENCY MEDICINE

## 2019-01-01 PROCEDURE — 25500064 ZZH RX 255 OP 636: Performed by: INTERNAL MEDICINE

## 2019-01-01 PROCEDURE — 96365 THER/PROPH/DIAG IV INF INIT: CPT

## 2019-01-01 PROCEDURE — 25000128 H RX IP 250 OP 636: Performed by: NURSE PRACTITIONER

## 2019-01-01 PROCEDURE — 85610 PROTHROMBIN TIME: CPT | Performed by: EMERGENCY MEDICINE

## 2019-01-01 PROCEDURE — 25000132 ZZH RX MED GY IP 250 OP 250 PS 637: Performed by: NURSE PRACTITIONER

## 2019-01-01 PROCEDURE — G0463 HOSPITAL OUTPT CLINIC VISIT: HCPCS | Mod: 25,27

## 2019-01-01 RX ORDER — NICOTINE POLACRILEX 4 MG
15-30 LOZENGE BUCCAL
Status: DISCONTINUED | OUTPATIENT
Start: 2019-01-01 | End: 2019-01-01 | Stop reason: HOSPADM

## 2019-01-01 RX ORDER — HYDROMORPHONE HYDROCHLORIDE 1 MG/ML
0.5 INJECTION, SOLUTION INTRAMUSCULAR; INTRAVENOUS; SUBCUTANEOUS
Status: DISCONTINUED | OUTPATIENT
Start: 2019-01-01 | End: 2020-01-01

## 2019-01-01 RX ORDER — AMOXICILLIN 250 MG
1 CAPSULE ORAL 2 TIMES DAILY
Status: DISCONTINUED | OUTPATIENT
Start: 2019-01-01 | End: 2020-01-01 | Stop reason: HOSPADM

## 2019-01-01 RX ORDER — ACETAMINOPHEN 500 MG
1000 TABLET ORAL EVERY 8 HOURS PRN
Status: DISCONTINUED | OUTPATIENT
Start: 2019-01-01 | End: 2019-01-01

## 2019-01-01 RX ORDER — OXYCODONE HYDROCHLORIDE 5 MG/1
5 TABLET ORAL ONCE
Status: COMPLETED | OUTPATIENT
Start: 2019-01-01 | End: 2019-01-01

## 2019-01-01 RX ORDER — CHLORAL HYDRATE 500 MG
1 CAPSULE ORAL DAILY
Status: ON HOLD | COMMUNITY
End: 2019-01-01

## 2019-01-01 RX ORDER — CEPHALEXIN 500 MG/1
500 CAPSULE ORAL 2 TIMES DAILY
DISCHARGE
Start: 2019-01-01 | End: 2019-01-01

## 2019-01-01 RX ORDER — ONDANSETRON 4 MG/1
4 TABLET, ORALLY DISINTEGRATING ORAL EVERY 6 HOURS PRN
Status: DISCONTINUED | OUTPATIENT
Start: 2019-01-01 | End: 2020-01-01 | Stop reason: HOSPADM

## 2019-01-01 RX ORDER — CEFTRIAXONE 1 G/1
1 INJECTION, POWDER, FOR SOLUTION INTRAMUSCULAR; INTRAVENOUS EVERY 24 HOURS
Status: DISCONTINUED | OUTPATIENT
Start: 2019-01-01 | End: 2019-01-01

## 2019-01-01 RX ORDER — CEFTRIAXONE 2 G/1
2 INJECTION, POWDER, FOR SOLUTION INTRAMUSCULAR; INTRAVENOUS ONCE
Status: COMPLETED | OUTPATIENT
Start: 2019-01-01 | End: 2019-01-01

## 2019-01-01 RX ORDER — AMLODIPINE BESYLATE 5 MG/1
5 TABLET ORAL DAILY
Status: DISCONTINUED | OUTPATIENT
Start: 2019-01-01 | End: 2019-01-01

## 2019-01-01 RX ORDER — ACETAMINOPHEN 500 MG
1000 TABLET ORAL EVERY 8 HOURS
Status: DISCONTINUED | OUTPATIENT
Start: 2019-01-01 | End: 2020-01-01 | Stop reason: HOSPADM

## 2019-01-01 RX ORDER — CLOPIDOGREL BISULFATE 75 MG/1
75 TABLET ORAL DAILY
Status: ON HOLD | COMMUNITY
End: 2019-01-01

## 2019-01-01 RX ORDER — ASPIRIN 81 MG/1
81 TABLET ORAL DAILY
Status: DISCONTINUED | OUTPATIENT
Start: 2019-01-01 | End: 2019-01-01

## 2019-01-01 RX ORDER — LANOLIN ALCOHOL/MO/W.PET/CERES
3 CREAM (GRAM) TOPICAL AT BEDTIME
COMMUNITY

## 2019-01-01 RX ORDER — HYDROMORPHONE HYDROCHLORIDE 1 MG/ML
0.5 INJECTION, SOLUTION INTRAMUSCULAR; INTRAVENOUS; SUBCUTANEOUS ONCE
Status: DISCONTINUED | OUTPATIENT
Start: 2019-01-01 | End: 2019-01-01

## 2019-01-01 RX ORDER — SODIUM CHLORIDE 9 MG/ML
1000 INJECTION, SOLUTION INTRAVENOUS CONTINUOUS
Status: DISCONTINUED | OUTPATIENT
Start: 2019-01-01 | End: 2020-01-01

## 2019-01-01 RX ORDER — LISINOPRIL 20 MG/1
20 TABLET ORAL DAILY
Status: DISCONTINUED | OUTPATIENT
Start: 2019-01-01 | End: 2019-01-01 | Stop reason: HOSPADM

## 2019-01-01 RX ORDER — HEPARIN SODIUM 5000 [USP'U]/.5ML
5000 INJECTION, SOLUTION INTRAVENOUS; SUBCUTANEOUS EVERY 12 HOURS
Status: DISCONTINUED | OUTPATIENT
Start: 2019-01-01 | End: 2019-01-01

## 2019-01-01 RX ORDER — POLYETHYLENE GLYCOL 3350 17 G/17G
17 POWDER, FOR SOLUTION ORAL DAILY PRN
Status: DISCONTINUED | OUTPATIENT
Start: 2019-01-01 | End: 2020-01-01 | Stop reason: HOSPADM

## 2019-01-01 RX ORDER — METOCLOPRAMIDE HYDROCHLORIDE 5 MG/ML
5 INJECTION INTRAMUSCULAR; INTRAVENOUS EVERY 6 HOURS PRN
Status: DISCONTINUED | OUTPATIENT
Start: 2019-01-01 | End: 2020-01-01 | Stop reason: HOSPADM

## 2019-01-01 RX ORDER — FUROSEMIDE 20 MG
10 TABLET ORAL DAILY
Qty: 45 TABLET | Refills: 3 | Status: SHIPPED | OUTPATIENT
Start: 2019-01-01 | End: 2019-01-01

## 2019-01-01 RX ORDER — METOPROLOL SUCCINATE 25 MG/1
12.5 TABLET, EXTENDED RELEASE ORAL DAILY
DISCHARGE
Start: 2019-01-01

## 2019-01-01 RX ORDER — POTASSIUM CHLORIDE 1500 MG/1
40 TABLET, EXTENDED RELEASE ORAL EVERY 4 HOURS
Status: COMPLETED | OUTPATIENT
Start: 2019-01-01 | End: 2019-01-01

## 2019-01-01 RX ORDER — POLYETHYLENE GLYCOL 3350 17 G/17G
17 POWDER, FOR SOLUTION ORAL DAILY PRN
Status: DISCONTINUED | OUTPATIENT
Start: 2019-01-01 | End: 2019-01-01 | Stop reason: HOSPADM

## 2019-01-01 RX ORDER — FUROSEMIDE 10 MG/ML
40 INJECTION INTRAMUSCULAR; INTRAVENOUS
Status: DISCONTINUED | OUTPATIENT
Start: 2019-01-01 | End: 2019-01-01

## 2019-01-01 RX ORDER — ONDANSETRON 2 MG/ML
4 INJECTION INTRAMUSCULAR; INTRAVENOUS EVERY 6 HOURS PRN
Status: DISCONTINUED | OUTPATIENT
Start: 2019-01-01 | End: 2019-01-01 | Stop reason: HOSPADM

## 2019-01-01 RX ORDER — ACETAMINOPHEN 500 MG
1000 TABLET ORAL ONCE
Status: COMPLETED | OUTPATIENT
Start: 2019-01-01 | End: 2019-01-01

## 2019-01-01 RX ORDER — ATORVASTATIN CALCIUM 20 MG/1
20 TABLET, FILM COATED ORAL AT BEDTIME
Status: DISCONTINUED | OUTPATIENT
Start: 2019-01-01 | End: 2020-01-01 | Stop reason: HOSPADM

## 2019-01-01 RX ORDER — CLOPIDOGREL BISULFATE 75 MG/1
75 TABLET ORAL DAILY
Status: DISCONTINUED | OUTPATIENT
Start: 2019-01-01 | End: 2019-01-01

## 2019-01-01 RX ORDER — LIDOCAINE HYDROCHLORIDE 20 MG/ML
JELLY TOPICAL ONCE
Status: COMPLETED | OUTPATIENT
Start: 2019-01-01 | End: 2019-01-01

## 2019-01-01 RX ORDER — FUROSEMIDE 20 MG
20 TABLET ORAL EVERY OTHER DAY
Qty: 45 TABLET | Refills: 3 | Status: ON HOLD | OUTPATIENT
Start: 2019-01-01 | End: 2019-01-01

## 2019-01-01 RX ORDER — NALOXONE HYDROCHLORIDE 0.4 MG/ML
.1-.4 INJECTION, SOLUTION INTRAMUSCULAR; INTRAVENOUS; SUBCUTANEOUS
Status: DISCONTINUED | OUTPATIENT
Start: 2019-01-01 | End: 2019-01-01 | Stop reason: HOSPADM

## 2019-01-01 RX ORDER — DEXTROSE MONOHYDRATE 25 G/50ML
25-50 INJECTION, SOLUTION INTRAVENOUS
Status: DISCONTINUED | OUTPATIENT
Start: 2019-01-01 | End: 2019-01-01 | Stop reason: HOSPADM

## 2019-01-01 RX ORDER — TAMSULOSIN HYDROCHLORIDE 0.4 MG/1
0.4 CAPSULE ORAL DAILY
Status: DISCONTINUED | OUTPATIENT
Start: 2019-01-01 | End: 2019-01-01 | Stop reason: HOSPADM

## 2019-01-01 RX ORDER — AMOXICILLIN 250 MG
2 CAPSULE ORAL 2 TIMES DAILY
Status: DISCONTINUED | OUTPATIENT
Start: 2019-01-01 | End: 2020-01-01 | Stop reason: HOSPADM

## 2019-01-01 RX ORDER — FUROSEMIDE 40 MG
40 TABLET ORAL DAILY
Qty: 90 TABLET | Refills: 1 | Status: SHIPPED | OUTPATIENT
Start: 2019-01-01 | End: 2019-01-01

## 2019-01-01 RX ORDER — LISINOPRIL 20 MG/1
20 TABLET ORAL DAILY
Status: DISCONTINUED | OUTPATIENT
Start: 2019-01-01 | End: 2020-01-01 | Stop reason: HOSPADM

## 2019-01-01 RX ORDER — ATORVASTATIN CALCIUM 20 MG/1
20 TABLET, FILM COATED ORAL AT BEDTIME
COMMUNITY

## 2019-01-01 RX ORDER — TAMSULOSIN HYDROCHLORIDE 0.4 MG/1
0.4 CAPSULE ORAL AT BEDTIME
Status: DISCONTINUED | OUTPATIENT
Start: 2019-01-01 | End: 2020-01-01 | Stop reason: HOSPADM

## 2019-01-01 RX ORDER — METOCLOPRAMIDE 5 MG/1
5 TABLET ORAL EVERY 6 HOURS PRN
Status: DISCONTINUED | OUTPATIENT
Start: 2019-01-01 | End: 2020-01-01 | Stop reason: HOSPADM

## 2019-01-01 RX ORDER — MAGNESIUM CARB/ALUMINUM HYDROX 105-160MG
148 TABLET,CHEWABLE ORAL
Status: DISCONTINUED | OUTPATIENT
Start: 2019-01-01 | End: 2020-01-01 | Stop reason: HOSPADM

## 2019-01-01 RX ORDER — FUROSEMIDE 20 MG
20 TABLET ORAL DAILY
Status: DISCONTINUED | OUTPATIENT
Start: 2020-01-01 | End: 2020-01-01

## 2019-01-01 RX ORDER — FUROSEMIDE 20 MG
20 TABLET ORAL DAILY
Status: DISCONTINUED | OUTPATIENT
Start: 2019-01-01 | End: 2019-01-01

## 2019-01-01 RX ORDER — ATORVASTATIN CALCIUM 20 MG/1
20 TABLET, FILM COATED ORAL AT BEDTIME
Status: DISCONTINUED | OUTPATIENT
Start: 2019-01-01 | End: 2019-01-01 | Stop reason: HOSPADM

## 2019-01-01 RX ORDER — FUROSEMIDE 40 MG
40 TABLET ORAL
Status: DISCONTINUED | OUTPATIENT
Start: 2019-01-01 | End: 2019-01-01 | Stop reason: HOSPADM

## 2019-01-01 RX ORDER — FUROSEMIDE 40 MG
40 TABLET ORAL
DISCHARGE
Start: 2019-01-01 | End: 2019-01-01

## 2019-01-01 RX ORDER — ONDANSETRON 4 MG/1
4 TABLET, ORALLY DISINTEGRATING ORAL EVERY 6 HOURS PRN
Status: DISCONTINUED | OUTPATIENT
Start: 2019-01-01 | End: 2019-01-01 | Stop reason: HOSPADM

## 2019-01-01 RX ORDER — ONDANSETRON 2 MG/ML
4 INJECTION INTRAMUSCULAR; INTRAVENOUS EVERY 6 HOURS PRN
Status: DISCONTINUED | OUTPATIENT
Start: 2019-01-01 | End: 2020-01-01 | Stop reason: HOSPADM

## 2019-01-01 RX ORDER — HYDROMORPHONE HYDROCHLORIDE 1 MG/ML
0.5 INJECTION, SOLUTION INTRAMUSCULAR; INTRAVENOUS; SUBCUTANEOUS ONCE
Status: COMPLETED | OUTPATIENT
Start: 2019-01-01 | End: 2019-01-01

## 2019-01-01 RX ORDER — LIDOCAINE 40 MG/G
CREAM TOPICAL
Status: DISCONTINUED | OUTPATIENT
Start: 2019-01-01 | End: 2020-01-01 | Stop reason: HOSPADM

## 2019-01-01 RX ORDER — TAMSULOSIN HYDROCHLORIDE 0.4 MG/1
0.4 CAPSULE ORAL DAILY
DISCHARGE
Start: 2019-01-01

## 2019-01-01 RX ORDER — PROCHLORPERAZINE MALEATE 5 MG
5 TABLET ORAL EVERY 6 HOURS PRN
Status: DISCONTINUED | OUTPATIENT
Start: 2019-01-01 | End: 2020-01-01 | Stop reason: HOSPADM

## 2019-01-01 RX ORDER — SODIUM CHLORIDE 9 MG/ML
INJECTION, SOLUTION INTRAVENOUS CONTINUOUS
Status: DISCONTINUED | OUTPATIENT
Start: 2019-01-01 | End: 2019-01-01

## 2019-01-01 RX ORDER — FUROSEMIDE 20 MG
20 TABLET ORAL
COMMUNITY
End: 2019-01-01

## 2019-01-01 RX ORDER — ONDANSETRON 2 MG/ML
4 INJECTION INTRAMUSCULAR; INTRAVENOUS EVERY 30 MIN PRN
Status: DISCONTINUED | OUTPATIENT
Start: 2019-01-01 | End: 2019-01-01

## 2019-01-01 RX ORDER — LISINOPRIL 20 MG/1
20 TABLET ORAL DAILY
COMMUNITY

## 2019-01-01 RX ORDER — LISINOPRIL 40 MG/1
40 TABLET ORAL DAILY
COMMUNITY
End: 2019-01-01

## 2019-01-01 RX ORDER — LANOLIN ALCOHOL/MO/W.PET/CERES
3 CREAM (GRAM) TOPICAL AT BEDTIME
Status: DISCONTINUED | OUTPATIENT
Start: 2019-01-01 | End: 2020-01-01 | Stop reason: HOSPADM

## 2019-01-01 RX ORDER — CEPHALEXIN 500 MG/1
500 CAPSULE ORAL 2 TIMES DAILY
Status: DISCONTINUED | OUTPATIENT
Start: 2019-01-01 | End: 2019-01-01 | Stop reason: HOSPADM

## 2019-01-01 RX ORDER — AMLODIPINE BESYLATE 5 MG/1
5 TABLET ORAL DAILY
Status: ON HOLD | COMMUNITY
End: 2019-01-01

## 2019-01-01 RX ORDER — ACETAMINOPHEN 325 MG/1
650 TABLET ORAL EVERY 4 HOURS PRN
Status: DISCONTINUED | OUTPATIENT
Start: 2019-01-01 | End: 2019-01-01 | Stop reason: HOSPADM

## 2019-01-01 RX ORDER — NALOXONE HYDROCHLORIDE 0.4 MG/ML
.1-.4 INJECTION, SOLUTION INTRAMUSCULAR; INTRAVENOUS; SUBCUTANEOUS
Status: DISCONTINUED | OUTPATIENT
Start: 2019-01-01 | End: 2020-01-01 | Stop reason: HOSPADM

## 2019-01-01 RX ORDER — MORPHINE SULFATE 4 MG/ML
4 INJECTION, SOLUTION INTRAMUSCULAR; INTRAVENOUS
Status: COMPLETED | OUTPATIENT
Start: 2019-01-01 | End: 2019-01-01

## 2019-01-01 RX ORDER — PROCHLORPERAZINE 25 MG
12.5 SUPPOSITORY, RECTAL RECTAL EVERY 12 HOURS PRN
Status: DISCONTINUED | OUTPATIENT
Start: 2019-01-01 | End: 2020-01-01 | Stop reason: HOSPADM

## 2019-01-01 RX ORDER — OXYCODONE HYDROCHLORIDE 5 MG/1
5 TABLET ORAL EVERY 6 HOURS PRN
Qty: 12 TABLET | Refills: 0 | Status: ON HOLD | OUTPATIENT
Start: 2019-01-01 | End: 2020-01-01

## 2019-01-01 RX ORDER — CHOLECALCIFEROL (VITAMIN D3) 50 MCG
2000 TABLET ORAL DAILY
Status: DISCONTINUED | OUTPATIENT
Start: 2019-01-01 | End: 2020-01-01 | Stop reason: HOSPADM

## 2019-01-01 RX ORDER — FUROSEMIDE 20 MG
20 TABLET ORAL DAILY
COMMUNITY

## 2019-01-01 RX ADMIN — ATORVASTATIN CALCIUM 20 MG: 20 TABLET, FILM COATED ORAL at 21:46

## 2019-01-01 RX ADMIN — FUROSEMIDE 40 MG: 40 TABLET ORAL at 07:46

## 2019-01-01 RX ADMIN — CLOPIDOGREL BISULFATE 75 MG: 75 TABLET, FILM COATED ORAL at 08:08

## 2019-01-01 RX ADMIN — ATORVASTATIN CALCIUM 20 MG: 20 TABLET, FILM COATED ORAL at 23:01

## 2019-01-01 RX ADMIN — APIXABAN 5 MG: 5 TABLET, FILM COATED ORAL at 07:48

## 2019-01-01 RX ADMIN — HYDROMORPHONE HYDROCHLORIDE 0.5 MG: 1 INJECTION, SOLUTION INTRAMUSCULAR; INTRAVENOUS; SUBCUTANEOUS at 13:20

## 2019-01-01 RX ADMIN — INSULIN ASPART 6 UNITS: 100 INJECTION, SOLUTION INTRAVENOUS; SUBCUTANEOUS at 12:14

## 2019-01-01 RX ADMIN — TAMSULOSIN HYDROCHLORIDE 0.4 MG: 0.4 CAPSULE ORAL at 07:47

## 2019-01-01 RX ADMIN — FUROSEMIDE 40 MG: 10 INJECTION, SOLUTION INTRAVENOUS at 16:04

## 2019-01-01 RX ADMIN — APIXABAN 5 MG: 5 TABLET, FILM COATED ORAL at 21:46

## 2019-01-01 RX ADMIN — SENNOSIDES AND DOCUSATE SODIUM 1 TABLET: 8.6; 5 TABLET ORAL at 20:13

## 2019-01-01 RX ADMIN — CEFTRIAXONE SODIUM 2 G: 2 INJECTION, POWDER, FOR SOLUTION INTRAMUSCULAR; INTRAVENOUS at 15:58

## 2019-01-01 RX ADMIN — ATORVASTATIN CALCIUM 20 MG: 20 TABLET, FILM COATED ORAL at 21:52

## 2019-01-01 RX ADMIN — ATORVASTATIN CALCIUM 20 MG: 20 TABLET, FILM COATED ORAL at 20:33

## 2019-01-01 RX ADMIN — CEFTRIAXONE SODIUM 1 G: 1 INJECTION, POWDER, FOR SOLUTION INTRAMUSCULAR; INTRAVENOUS at 17:30

## 2019-01-01 RX ADMIN — INSULIN ASPART 1 UNITS: 100 INJECTION, SOLUTION INTRAVENOUS; SUBCUTANEOUS at 09:25

## 2019-01-01 RX ADMIN — FUROSEMIDE 20 MG: 20 TABLET ORAL at 18:05

## 2019-01-01 RX ADMIN — ACETAMINOPHEN 1000 MG: 500 TABLET, FILM COATED ORAL at 19:49

## 2019-01-01 RX ADMIN — APIXABAN 5 MG: 5 TABLET, FILM COATED ORAL at 21:52

## 2019-01-01 RX ADMIN — FUROSEMIDE 40 MG: 10 INJECTION, SOLUTION INTRAVENOUS at 07:48

## 2019-01-01 RX ADMIN — CEFTRIAXONE SODIUM 1 G: 1 INJECTION, POWDER, FOR SOLUTION INTRAMUSCULAR; INTRAVENOUS at 16:03

## 2019-01-01 RX ADMIN — FUROSEMIDE 40 MG: 10 INJECTION, SOLUTION INTRAVENOUS at 17:34

## 2019-01-01 RX ADMIN — HYDROMORPHONE HYDROCHLORIDE 0.5 MG: 1 INJECTION, SOLUTION INTRAMUSCULAR; INTRAVENOUS; SUBCUTANEOUS at 16:06

## 2019-01-01 RX ADMIN — LIDOCAINE HYDROCHLORIDE: 20 JELLY TOPICAL at 17:03

## 2019-01-01 RX ADMIN — SODIUM CHLORIDE 1000 ML: 9 INJECTION, SOLUTION INTRAVENOUS at 23:00

## 2019-01-01 RX ADMIN — AMLODIPINE BESYLATE 5 MG: 5 TABLET ORAL at 08:09

## 2019-01-01 RX ADMIN — METOPROLOL SUCCINATE 12.5 MG: 25 TABLET, EXTENDED RELEASE ORAL at 08:09

## 2019-01-01 RX ADMIN — MORPHINE SULFATE 4 MG: 4 INJECTION INTRAVENOUS at 08:45

## 2019-01-01 RX ADMIN — INSULIN ASPART 3 UNITS: 100 INJECTION, SOLUTION INTRAVENOUS; SUBCUTANEOUS at 13:08

## 2019-01-01 RX ADMIN — ASPIRIN 81 MG: 81 TABLET, COATED ORAL at 18:05

## 2019-01-01 RX ADMIN — LISINOPRIL 20 MG: 20 TABLET ORAL at 07:47

## 2019-01-01 RX ADMIN — MICONAZOLE NITRATE: 20 POWDER TOPICAL at 23:07

## 2019-01-01 RX ADMIN — OXYCODONE HYDROCHLORIDE 5 MG: 5 TABLET ORAL at 21:52

## 2019-01-01 RX ADMIN — SODIUM CHLORIDE 1000 ML: 9 INJECTION, SOLUTION INTRAVENOUS at 08:45

## 2019-01-01 RX ADMIN — HYDROMORPHONE HYDROCHLORIDE 0.5 MG: 1 INJECTION, SOLUTION INTRAMUSCULAR; INTRAVENOUS; SUBCUTANEOUS at 18:25

## 2019-01-01 RX ADMIN — APIXABAN 5 MG: 5 TABLET, FILM COATED ORAL at 08:09

## 2019-01-01 RX ADMIN — HEPARIN SODIUM 5000 UNITS: 5000 INJECTION, SOLUTION INTRAVENOUS; SUBCUTANEOUS at 20:33

## 2019-01-01 RX ADMIN — HEPARIN SODIUM 5000 UNITS: 5000 INJECTION, SOLUTION INTRAVENOUS; SUBCUTANEOUS at 08:08

## 2019-01-01 RX ADMIN — METOPROLOL SUCCINATE 12.5 MG: 25 TABLET, EXTENDED RELEASE ORAL at 07:47

## 2019-01-01 RX ADMIN — POTASSIUM CHLORIDE 40 MEQ: 1500 TABLET, EXTENDED RELEASE ORAL at 12:18

## 2019-01-01 RX ADMIN — SODIUM CHLORIDE: 9 INJECTION, SOLUTION INTRAVENOUS at 17:42

## 2019-01-01 RX ADMIN — HYDROMORPHONE HYDROCHLORIDE 0.5 MG: 1 INJECTION, SOLUTION INTRAMUSCULAR; INTRAVENOUS; SUBCUTANEOUS at 23:00

## 2019-01-01 RX ADMIN — ASPIRIN 81 MG: 81 TABLET, COATED ORAL at 08:08

## 2019-01-01 RX ADMIN — CEPHALEXIN 500 MG: 500 CAPSULE ORAL at 00:57

## 2019-01-01 RX ADMIN — HUMAN ALBUMIN MICROSPHERES AND PERFLUTREN 9 ML: 10; .22 INJECTION, SOLUTION INTRAVENOUS at 09:00

## 2019-01-01 RX ADMIN — CEPHALEXIN 500 MG: 500 CAPSULE ORAL at 07:48

## 2019-01-01 RX ADMIN — LISINOPRIL 20 MG: 20 TABLET ORAL at 08:08

## 2019-01-01 RX ADMIN — OXYCODONE HYDROCHLORIDE 5 MG: 5 TABLET ORAL at 10:29

## 2019-01-01 RX ADMIN — MORPHINE SULFATE 4 MG: 4 INJECTION INTRAVENOUS at 07:20

## 2019-01-01 RX ADMIN — SODIUM CHLORIDE 500 ML: 9 INJECTION, SOLUTION INTRAVENOUS at 14:57

## 2019-01-01 RX ADMIN — TAMSULOSIN HYDROCHLORIDE 0.4 MG: 0.4 CAPSULE ORAL at 20:13

## 2019-01-01 RX ADMIN — APIXABAN 5 MG: 5 TABLET, FILM COATED ORAL at 14:48

## 2019-01-01 RX ADMIN — LISINOPRIL 20 MG: 20 TABLET ORAL at 08:09

## 2019-01-01 RX ADMIN — MORPHINE SULFATE 4 MG: 4 INJECTION INTRAVENOUS at 06:34

## 2019-01-01 RX ADMIN — POTASSIUM CHLORIDE 40 MEQ: 1500 TABLET, EXTENDED RELEASE ORAL at 09:27

## 2019-01-01 RX ADMIN — INSULIN ASPART 2 UNITS: 100 INJECTION, SOLUTION INTRAVENOUS; SUBCUTANEOUS at 08:56

## 2019-01-01 RX ADMIN — ACETAMINOPHEN 1000 MG: 500 TABLET, FILM COATED ORAL at 17:41

## 2019-01-01 RX ADMIN — SODIUM CHLORIDE 1000 ML: 9 INJECTION, SOLUTION INTRAVENOUS at 16:42

## 2019-01-01 RX ADMIN — FUROSEMIDE 40 MG: 10 INJECTION, SOLUTION INTRAVENOUS at 08:09

## 2019-01-01 RX ADMIN — OXYCODONE HYDROCHLORIDE 5 MG: 5 TABLET ORAL at 19:49

## 2019-01-01 RX ADMIN — ONDANSETRON 4 MG: 2 INJECTION INTRAMUSCULAR; INTRAVENOUS at 06:34

## 2019-01-01 RX ADMIN — METOPROLOL SUCCINATE 12.5 MG: 25 TABLET, EXTENDED RELEASE ORAL at 14:48

## 2019-01-01 RX ADMIN — INSULIN ASPART 1 UNITS: 100 INJECTION, SOLUTION INTRAVENOUS; SUBCUTANEOUS at 08:07

## 2019-01-01 RX ADMIN — AMLODIPINE BESYLATE 5 MG: 5 TABLET ORAL at 08:08

## 2019-01-01 RX ADMIN — APIXABAN 5 MG: 5 TABLET, FILM COATED ORAL at 20:13

## 2019-01-01 ASSESSMENT — ENCOUNTER SYMPTOMS
DYSURIA: 0
FATIGUE: 1
FREQUENCY: 1
FEVER: 0
LIGHT-HEADEDNESS: 0
MYALGIAS: 1
ARTHRALGIAS: 1
VOMITING: 0
NUMBNESS: 0
WEAKNESS: 1
BLOOD IN STOOL: 0
SHORTNESS OF BREATH: 0
BLOOD IN STOOL: 0
COUGH: 0
WOUND: 1
COLOR CHANGE: 1
FEVER: 0
WOUND: 1
DIZZINESS: 0

## 2019-01-01 ASSESSMENT — ACTIVITIES OF DAILY LIVING (ADL)
ADLS_ACUITY_SCORE: 17
ADLS_ACUITY_SCORE: 24
ADLS_ACUITY_SCORE: 20
ADLS_ACUITY_SCORE: 25
ADLS_ACUITY_SCORE: 25
ADLS_ACUITY_SCORE: 24
ADLS_ACUITY_SCORE: 25
ADLS_ACUITY_SCORE: 24
ADLS_ACUITY_SCORE: 25
ADLS_ACUITY_SCORE: 25
ADLS_ACUITY_SCORE: 18
ADLS_ACUITY_SCORE: 24
ADLS_ACUITY_SCORE: 25
ADLS_ACUITY_SCORE: 24
ADLS_ACUITY_SCORE: 19
ADLS_ACUITY_SCORE: 24
ADLS_ACUITY_SCORE: 25
ADLS_ACUITY_SCORE: 24
ADLS_ACUITY_SCORE: 25

## 2019-01-01 ASSESSMENT — MIFFLIN-ST. JEOR
SCORE: 1541.73
SCORE: 1608.25
SCORE: 1675.25
SCORE: 1661.48
SCORE: 1529.94
SCORE: 1559.42
SCORE: 1517.69
SCORE: 1565.76
SCORE: 1552.62
SCORE: 1539.01
SCORE: 1570.3
SCORE: 1527.45

## 2019-01-28 NOTE — ED AVS SNAPSHOT
Emergency Department  [address] [place] [address]  Phone:  [phone]  Fax:  [phone]                                    Renato Mccartney   MRN: [id number]    Department:   Emergency Department   Date of Visit:  1/28/2019           After Visit Summary Signature Page    I have received my discharge instructions, and my questions have been answered. I have discussed any challenges I see with this plan with the nurse or doctor.    ..........................................................................................................................................  Patient/Patient Representative Signature      ..........................................................................................................................................  Patient Representative Print Name and Relationship to Patient    ..................................................               ................................................  Date                                   Time    ..........................................................................................................................................  Reviewed by Signature/Title    ...................................................              ..............................................  Date                                               Time          22EPIC Rev 08/18

## 2019-01-28 NOTE — ED PROVIDER NOTES
History     Chief Complaint:  Fall    HPI   Renato Mccartney is a 86 year old male, with a history of stroke, who presents with his daughter to the emergency department for evaluation of a fall. The patient reports he lives independently in an apartment and uses a walker to ambulate, due to ongoing balance issues, and was trying to go to the bathroom this afternoon, when he lost his balance and fell against his toilet. He reports his ceramic tank of the toilet broke and he cut his left index and middle finger on the toilet. He denies any loss of consciousness or hitting his head. He denies any current pain, vomiting, blood in stool, or shortness of breath. He reports ongoing intermittent dizziness that is unchanged since his stroke.    Allergies:  No known drug allergies     Medications:    Plavix    Past Medical History:    Stroke    Past Surgical History:    History reviewed. No pertinent surgical history.    Family History:    History reviewed. No pertinent family history.     Social History:  The patient presents to the emergency department with his daughter.  Marital Status:       Review of Systems   Respiratory: Negative for shortness of breath.    Gastrointestinal: Negative for blood in stool and vomiting.   Skin: Positive for wound.   All other systems reviewed and are negative.        Physical Exam   Patient Vitals for the past 24 hrs:   BP Heart Rate SpO2   01/28/19 1507 (!) 138/92 74 94 %     Physical Exam  Physical Exam   Constitutional: Pt appears well-developed and well-nourished.  Head: Atraumatic. Head moves freely with normal range of motion. No battles signs. No Racoons eyes.   ENT: Oropharynx is clear and moist.   Eyes: Conjunctivae pink. EOMs intact. Pupils are equal, round, and reactive to light.  Neck: Normal range of motion. No midline C Spine tenderness, step-off or crepitus.   Cardiovascular: Regular rate and rhythm. Normal heart sounds. No concerning  murmur heard. Intact distal pulses:  radial pulses 2+ on the right, 2+ on the left.   Pulmonary/Chest: No respiratory distress.  Breath sounds normal. No decreased breath sounds. No wheezes. No rhonchi. No rales. No chest wall tenderness or crepitus.    Abdominal: Soft. Non-tender. No rebound, no guarding.   Musculoskeletal: No edema. No tenderness. Distal capillary refill and sensation intact.  Neurological: Oriented to person, place, and time. Left arm and hand with contractures (since CVA).   Skin: Left index and middle finger with 2 small lacerations, see laceration repair note below.         Emergency Department Course   Laboratory:  CBC: HGB 11.9 (L) o/w WNL (WBC 6.1, )  BMP: Glucose 165 (H), GFR 54 (L) o/w WNL (Creatinine 1.20)    Procedures:   Laceration Repair        LACERATION:  A simple clean v-shaped 2 cm laceration.      LOCATION:  Left index finger radial aspect near DIP joint.      FUNCTION:  Distally sensation, circulation, motor and tendon function are intact.      ANESTHESIA: 1 cc 1% lidocaine without epinephrine      PREPARATION:  Irrigation with Normal Saline      DEBRIDEMENT:  no debridement      CLOSURE:  Wound was closed with One Layer.  Skin closed with 2 x 5.0 Ethylon using interrupted sutures.     Laceration Repair        LACERATION:  A simple clean linear 2 cm laceration.      LOCATION:  Left middle finger radial aspect near DIP joint      FUNCTION:  Distally sensation, circulation, motor and tendon function are intact.      ANESTHESIA:  1 cc 1% lidocaine without epinephrine      PREPARATION:  Irrigation with Normal Saline      DEBRIDEMENT:  no debridement      CLOSURE:  Wound was closed with One Layer.  Skin closed with 2 x 5.0 Ethylon using interrupted sutures. Area at the end of the laceration not amendable to sutures.    Emergency Department Course:  Patient arrived via EMS.    Past medical records, nursing notes, and vitals reviewed.  1413: I performed an exam of the patient and obtained history, as documented  above.     IV inserted and blood drawn.    1644: I rechecked the patient. Explained findings to the patient.     1656: I repaired the laceration, as stated above.    1715: I rechecked the patient. Findings and plan explained to the Patient. Patient discharged home with instructions regarding supportive care, medications, and reasons to return. The importance of close follow-up was reviewed.   Impression & Plan    Medical Decision Making:  Renato Mccartney is a 86 year old male with known balance issues and uses a walker. While going to the bathroom today he fell back against the toilet causing the toilet tank to break and causing small cuts to his left index and middle finger. He notes no CP, no syncope and no head injury. Exam with no concerns for further injuries other than these lacerations. Lab work with elevated glucose, he is on Metformin. We discussed wound care, need for follow up for recheck and suture removal in 10 days. He and his daughters are amenable to plan.       Diagnosis:    ICD-10-CM   1. Fall, initial encounter W19.XXXA   2. Laceration of left index finger without foreign body without damage to nail, initial encounter S61.211A     Disposition:  Discharged to home with instructions for follow up.  Carri Hyde  1/28/2019    EMERGENCY DEPARTMENT  Scribe Disclosure:  I, Carri Hyde, am serving as a scribe at 2:13 PM on 1/28/2019 to document services personally performed by Marta Enrique APRN based on my observations and the provider's statements to me.      Marta Enrique APRN CNP  01/28/19 2040

## 2019-01-28 NOTE — DISCHARGE INSTRUCTIONS
Sutures out in 10 days.     Return for signs or symptoms of infection such as: fever, increased redness, heat to touch, increased pain or pus-like drainage.      Keep wound clean, dry and covered. Dry well after washing.

## 2019-01-28 NOTE — ED NOTES
Bed: ED27  Expected date:   Expected time:   Means of arrival:   Comments:  436  86 M fall/hand injury  1411

## 2019-04-11 PROBLEM — I48.91 ATRIAL FIBRILLATION (H): Status: ACTIVE | Noted: 2019-01-01

## 2019-04-11 PROBLEM — I48.91 NEW ONSET ATRIAL FIBRILLATION (H): Status: ACTIVE | Noted: 2019-01-01

## 2019-04-11 PROBLEM — R74.8 ELEVATION OF CARDIAC ENZYMES: Status: ACTIVE | Noted: 2019-01-01

## 2019-04-11 PROBLEM — I10 HTN (HYPERTENSION): Status: ACTIVE | Noted: 2019-01-01

## 2019-04-11 PROBLEM — E11.9 DIABETES MELLITUS, TYPE 2 (H): Status: ACTIVE | Noted: 2019-01-01

## 2019-04-11 PROBLEM — N39.0 URINARY TRACT INFECTION: Status: ACTIVE | Noted: 2019-01-01

## 2019-04-11 NOTE — ED NOTES
Bed: ED04  Expected date:   Expected time:   Means of arrival:   Comments:  447  86 M leg weakness  ekg changes  1249

## 2019-04-11 NOTE — PROGRESS NOTES
DATE & TIME:4/11/19  ORIENTATION:O/A#4, forgetful, cooperative  BEHAVIOR & AGGRESSION TOOL COLOR:green  ABNL VS/O2:VSS on RA,   MOBILITY:up w/1/GB/walker  PAIN MANAGMENT:denies  DIET:ADA, good po  BOWEL/BLADDER:, occ incont of urine, increased frequency  ABNL LAB/BG:trops-  DRAIN/DEVICES:PIV  TELEMETRY RHYTHM:a-fib w/CVR  SKIN:bruised, scabbed, abrasion  TESTS/PROCEDURES:Echo/Cards/PT tomorrow  D/C DAY/GOALS/PLACE:pending  OTHER IMPORTANT INFO:

## 2019-04-11 NOTE — ED NOTES
"[facility]  ED Nurse Handoff Report    ED Chief complaint: Generalized Weakness      ED Diagnosis:   Final diagnoses:   Urinary tract infection with hematuria, site unspecified   Atrial fibrillation, new onset (H)       Code Status: admitting to address/confirm    Allergies: No Known Allergies    Activity level - Baseline/Home:  Independent with walker    Activity Level - Current:   Stand with Assist with walker     Needed?: No    Isolation: No  Infection: Not Applicable  Bariatric?: No    Vital Signs:   Vitals:    04/11/19 1415 04/11/19 1430 04/11/19 1445 04/11/19 1500   BP: 120/72 121/78 112/79 131/81   Pulse: 62 70 79 73   Resp: 16  17 18   Temp:       TempSrc:       SpO2: 94%  95% 94%   Weight:       Height:           Cardiac Rhythm: ,   Cardiac  Cardiac Rhythm: Atrial fibrillation    Pain level: 0-10 Pain Scale: 2    Is this patient confused?: No , just forgetful  Does this patient have a guardian?  No         If yes, is there guardianship documents in the Epic \"Code/ACP\" activity?  N/A         Guardian Notified?  N/A  [place] - Suicide Severity Rating Scale Completed?  Yes  If yes, what color did the patient score?  White    Patient Report: Initial Complaint: felt generally weak and fatigued this morning. Called EMS who found to be in a.fib which is new to patient.  Focused Assessment: generally weak, low grade fever, foul smelling urine. Denies CP or SOB.  Tests Performed: ekg, labs, cxr, ua/uc  Abnormal Results:    Labs Ordered and Resulted from Time of ED Arrival Up to the Time of Departure from the ED   CBC WITH PLATELETS DIFFERENTIAL - Abnormal; Notable for the following components:       Result Value    WBC 11.2 (*)     RBC Count 4.21 (*)     Hemoglobin 12.8 (*)     Hematocrit 38.4 (*)     RDW 15.5 (*)     Absolute Neutrophil 9.2 (*)     All other components within normal limits   BASIC METABOLIC PANEL - Abnormal; Notable for the following components:    Glucose 152 (*)     " GFR Estimate 54 (*)     All other components within normal limits   TROPONIN I - Abnormal; Notable for the following components:    Troponin I ES 0.089 (*)     All other components within normal limits   UA MACROSCOPIC WITH REFLEX TO MICRO AND CULTURE - Abnormal; Notable for the following components:    Blood Urine Small (*)     Protein Albumin Urine 100 (*)     Nitrite Urine Positive (*)     Leukocyte Esterase Urine Large (*)     RBC Urine 11 (*)     WBC Urine >182 (*)     WBC Clumps Present (*)     Bacteria Urine Moderate (*)     All other components within normal limits   TROPONIN I - Abnormal; Notable for the following components:    Troponin I ES 0.097 (*)     All other components within normal limits   STRAIGHT CATH FOR URINE   URINE CULTURE AEROBIC BACTERIAL   URINE CULTURE AEROBIC BACTERIAL           Treatments provided: 500 mL NS bolus    Family Comments: daughters at bedside    OBS brochure/video discussed/provided to patient/family: N/A    ED Medications:   Medications   0.9% sodium chloride BOLUS (500 mLs Intravenous New Bag 4/11/19 0347)       Drips infusing?:  No    For the majority of the shift this patient was Green.   Interventions performed were care and rounding.    Severe Sepsis OR Septic Shock Diagnosis Present: No    To be done/followed up on inpatient unit:      ED NURSE PHONE NUMBER: *[phone]

## 2019-04-11 NOTE — ED PROVIDER NOTES
"  History     Chief Complaint:  Generalized Weakness    HPI   Renato Mccartney is a 86 year old male with a history of hypertension, diabetes, and a cerebral infarction on plavix who presents to the emergency department today for evaluation of generalized weakness. The patient reports increased generalized weakness, fatigue, and mild leg pain beginning this morning, prompting a call to EMS. Here the patient notes that he has felt otherwise well over the last two to three days. However, family states that the patient was \"not like his usual self\" on 4/6 and 4/7, though unlike his state today. They add that the patient has had increased urinary frequency, foul smelling urine, and urinary accidents as of recent. The patient denies any fever, cough, dysuria, bloody stool, or history of pneumonia.     Allergies:  No Known Drug Allergies     Medications:    Norvasc  Lipitor  Plavix  Lisinopril  Glucophage    Past Medical History:    Cerebral infarction  Detached retina  Diabetes  Hypertension  Lymphoma  No history of pneumonia    Past Surgical History:    Appendectomy   Back surgery  Cataracts  Orthopedics    Family History:    Family history reviewed. No pertinent family history.    Social History:  Smoking Status: Former Smoker  Smokeless Tobacco: Never Used  Alcohol Use: Positive  Drug Use: Negative  Marital Status:        Review of Systems   Constitutional: Positive for fatigue. Negative for fever.   Respiratory: Negative for cough.    Gastrointestinal: Negative for blood in stool.   Genitourinary: Positive for frequency. Negative for dysuria.        Foul smelling urine, accidents   Musculoskeletal:        Leg pain   Neurological: Positive for weakness.   All other systems reviewed and are negative.      Physical Exam     Patient Vitals for the past 24 hrs:   BP Temp Temp src Pulse Heart Rate Resp SpO2 Height Weight   04/11/19 1600 130/88 -- -- 70 -- -- 96 % -- --   04/11/19 1530 126/68 -- -- 69 -- -- 95 % -- -- " "  04/11/19 1500 131/81 -- -- 73 71 18 94 % -- --   04/11/19 1445 112/79 -- -- 79 84 17 95 % -- --   04/11/19 1430 121/78 -- -- 70 71 -- -- -- --   04/11/19 1415 120/72 -- -- 62 71 16 94 % -- --   04/11/19 1400 -- -- -- -- -- 18 90 % -- --   04/11/19 1345 117/65 -- -- 68 63 14 90 % -- --   04/11/19 1330 112/65 -- -- 63 64 28 94 % -- --   04/11/19 1307 131/62 99  F (37.2  C) Oral -- 73 16 99 % 1.778 m (5' 10\") 97.5 kg (215 lb)     Physical Exam  Vitals: reviewed by me  General: Pt seen on [facility], cooperative, and alert to conversation  Eyes: Tracking well, clear conjunctiva BL  ENT: MMM, midline trachea.   Lungs:  No tachypnea, no accessory muscle use. No respiratory distress.   CV: Rate as above, irreg irreg rhythm.    Abd: Soft, non tender, no guarding, no rebound. Non distended  MSK: no peripheral edema or joint effusion.  No evidence of trauma  Skin: No rash, normal turgor and temperature  Neuro: Clear speech and no facial droop.  Psych: Not RIS, no e/o AH/VH      Emergency Department Course     ECG:  ECG taken at 1303  Atrial fibrillation  Left axis deviation  Nonspecific ST abnormality  Abnormal ECG  Rate 76 bpm. WV interval * ms. QRS duration 92 ms. QT/QTc 388/436 ms. P-R-T axes * -5 48.    Imaging:  Radiology findings were communicated with the patient who voiced understanding of the findings.    XR Chest 2 Views  Two views of the chest are performed. Small right and  trace left pleural effusions are present with right lung base  atelectasis or infiltrate is noted. A few patchy airspace opacities  are also noted at the left lung base. Pneumonia is possible. Heart is  normal in size. No pneumothorax.  SILVINO LANDAVERDE MD  Reading per radiology    Laboratory:  Laboratory findings were communicated with the patient who voiced understanding of the findings.    UA Reflex to Microscopic and Culture: Blood small (A), Protein Albumin 100 (A), Nitrite positive (A), Leukocyte Esterase large (A), RBC 11 " (H), WBC >182 (H), WBC Clumps present (A), Bacteria moderate (A), o/w WNL   Urine Culture: pending  CBC: WBC 11.2 (H), HGB 12.8 (L),   BMP: Glucose 152 (H), GFR Estimate 54 (L) o/w WNL (Creatinine 1.20)  Troponin (Collected: 1314): 0.089 (H)   Troponin (Collected: 1459): 0.097 (H)     Interventions:  1457  ml IV  1558 Rocephin 2 g IV    Emergency Department Course:    1314 IV was inserted and blood was drawn for laboratory testing, results above.    1348 The patient was sent for a chest xray while in the emergency department, results above.     1357 Nursing notes and vitals reviewed.    1435 I performed an exam of the patient as documented above.     1451 The patient provided a urine sample here in the emergency department. This was sent for laboratory testing, findings above.    1459 Repeat troponin level obtained.     1557 I spoke with Dr. Dorsey of the hospitalist service from [facility] regarding patient's presentation, findings, and plan of care.    1706 I personally reviewed the laboratory and imaging results with the patient and answered all related questions prior to admission.    Impression & Plan      Medical Decision Making:  Renato Mccartney is a 86 year old male who presents to the emergency department today for evaluation of generalized weakness, likely owing to a urinary tract infection as well as what appears to be new onset atrial fibrillation with a normal rate. Regarding his urinary infection, he has had some urinary incontinence and some foul smelling urine for at least two days. I think this is likely representing the main reason for him to feel weak. I have given him some ceftriaxone for this, as he will be admitted. The patient is okay with staying regarding his infection. We also went over his new onset atrial fibrillation. It is unclear if his atrial fibrillation is related to his infection, although I think this probability is the most likely. He may have an organic  cardiac issue as well, and I do appreciate that his troponin is slightly elevated. I do think this is secondary. He has no chest pain or anginal equivalents at this time. I do not think he needs to have heparin started. I have relayed my plan to Dr. Dorsey of the hospitalist service who did voice agreement and generously accepted care of the patient to [facility]. He will be monitored with serial labs and telemetry, which I think is reasonable and in the patient's best interest. Will monitor very carefully until inpatient bed is available.     Diagnosis:    ICD-10-CM    1. Urinary tract infection with hematuria, site unspecified N39.0     R31.9    2. Atrial fibrillation, new onset (H) I48.91      Disposition:   The patient is admitted into the care of Dr. Dorsey.      Scribe Disclosure:  I, Yesi Bundy, am serving as a scribe at 1:53 PM on 4/11/2019 to document services personally performed by Feng Coronel MD based on my observations and the provider's statements to me.      EMERGENCY DEPARTMENT       Kiran Coronel MD  04/11/19 2012

## 2019-04-11 NOTE — ED NOTES
Report received. Patient visualized. Vital signs stable. Patient and family denies needs at this time.. Will continue to monitor

## 2019-04-11 NOTE — PROGRESS NOTES
Admission    Patient arrives to room 619-1 via cart from ED  Care plan note: Pt A/O#4, forgetful, VSS on RA, denies pain/SOB, tele-a-fib w/CVR, up w/1/GB, uses urinal, good po, IVF infusing, serial trops, Echo in am, Cards/PT eval tomorrow.    Inpatient nursing criteria listed below were met:    PCD's Documented: yes  Skin issues/needs documented :yes  Isolation education started/completed na  Patient allergies verified with patient: none  Verified completion of [place] Risk Assessment Tool:  yes  Verified completion of Guardianship screening tool: yes  Fall Prevention: Care plan updated, Education given and documented yes  Care Plan initiated: yes  Home medications documented in belongings flowsheet: yes  Patient belongings documented in belongings flowsheet: yes  Admission profile/ required documentation complete: yes

## 2019-04-11 NOTE — PROGRESS NOTES
RECEIVING UNIT ED HANDOFF REVIEW    ED Nurse Handoff Report was reviewed by: Claudette Sauceda on April 11, 2019 at 4:53 PM

## 2019-04-11 NOTE — H&P
[facility]    History and Physical  Hospitalist       Date of Admission:  4/11/2019    Assessment & Plan   Renato Mccartney is a 86 year old male with DM type 2, HTN, hx of CVA with slight residual left hemiparesis who presents with weakness for 1 day and found to have:    Summary:    Principal Problem:    Atrial fibrillation -- new Onset   -- telemetry, cardiac echo, TSH, serial trops   -- cardiology consult (he prefers to see one)       Elevation of cardiac enzymes   -- serial trops   -- will add Aspirin EC 81 mg daily to current Plavix      Urinary tract infection   -- UA w >182 WBC's, will get UC   -- IV fluids   -- IV Ceftriaxone, adjust when UC back      Nocturia x 4-5, Probable BPH    -- check PVR with bladder scanner      Possible Heart failure -- right pleural effusion, edema   -- await cardiac echo   -- will give Lasix 20 mg po after IV fluids started   -- add Pro BNP      Systolic Murmur   -- await echo    Active Problems:    Diabetes mellitus, type 2 (H)   -- Glucometer qid, insulin as needed    HTN (hypertension)    --    Hx of Stroke   -- continue Plavix  Plan: As above, admit to medicine floor.      DVT Prophylaxis: Heparin SQ  Code Status: DNR / DNI    Disposition: Expected discharge in 2-3 days, may need TCU if still weak, will get PT eval.     Ryan Dorsey MD  Pager: [phone]  Cell Phone:  [phone]     Primary Care Physician   Physician No Ref-Primary    Chief Complaint   Weakness    History is obtained from Patient    History of Present Illness   86 year old male with DM type 2, HTN, hx of CVA with slight residual left hemiparesis who presents with weakness for 1 day.  No dysuria and no fever or chills, but has UA >182 WBC's. No hx of UTI, but does have to get up 3 to 5 times at night to urinate.  No hx of prostate problems that he is aware of.      Also on EKG he has afib with rate of 76.  No hx of heart disease, and no chest pain or SOB.  His trop is elevated at  0.097.  WBC is 11.2, CXR with right base infiltrate vs atelectasis -- no cough or sputum production (suspect this is Atelectasis).  Does have small right pleural effusion.        Past Medical History    I have reviewed this patient's medical history and updated it with pertinent information if needed.   Past Medical History:   Diagnosis Date     Cerebral infarction (H) 2004    Residual left hemiparesis, on Plavix     Detached retina, left      DM2 (diabetes mellitus, type 2) (H) 2000     Hypertension      Lymphoma 2008    treated with Chemo/Rad in 2008, recurrence 2017 tx'd with chemo       Past Surgical History   I have reviewed this patient's surgical history and updated it with pertinent information if needed.  Past Surgical History:   Procedure Laterality Date     APPENDECTOMY  1949     BACK SURGERY  1954     EYE SURGERY      cataracts       Prior to Admission Medications   Prior to Admission Medications   Prescriptions Last Dose Informant Patient Reported? Taking?   amLODIPine (NORVASC) 5 MG tablet 4/11/2019 at am Pharmacy Yes Yes   Sig: Take 5 mg by mouth daily   atorvastatin (LIPITOR) 20 MG tablet 4/10/2019 at  Pharmacy Yes Yes   Sig: Take 20 mg by mouth At Bedtime    cholecalciferol 1000 units TABS 4/11/2019 at am Daughter Yes Yes   Sig: Take 2,000 Units by mouth daily    clopidogrel (PLAVIX) 75 MG tablet 4/11/2019 at am Pharmacy Yes Yes   Sig: Take 75 mg by mouth daily   fish oil-omega-3 fatty acids 1000 MG capsule 4/11/2019 at am Daughter Yes Yes   Sig: Take 1 g by mouth daily   lisinopril (PRINIVIL/ZESTRIL) 20 MG tablet 4/11/2019 at am Pharmacy Yes Yes   Sig: Take 20 mg by mouth daily   metFORMIN (GLUCOPHAGE) 500 MG tablet 4/11/2019 at  Pharmacy Yes Yes   Sig: Take 500 mg by mouth 2 times daily (with meals)      Facility-Administered Medications: None     Allergies   No Known Allergies    Social History   I have reviewed this patient's social history and updated it with pertinent information if  needed. Renato Mccartney  reports that he quit smoking about 39 years ago. He has never used smokeless tobacco. He reports that he drinks alcohol. He reports that he does not use drugs.    Family History   I have reviewed this patient's family history and updated it with pertinent information if needed.   Family History   Problem Relation Age of Onset     Abdominal Aortic Aneurysm Mother          at age 99     Myocardial Infarction Father          age 65       Review of Systems   The 10 point Review of Systems is negative other than noted in the HPI or here.     # Pain Assessment:  Current Pain Score 2019   Patient currently in pain? yes   Pain score (0-10) 2   - Renato is experiencing pain due to lying on ER bed. Pain management was discussed and the plan was created in a collaborative fashion.  Renato's response to the current recommendations: engaged  - Tylenol PRN    Physical Exam   Temp: 99  F (37.2  C) Temp src: Oral BP: 131/81 Pulse: 73 Heart Rate: 71 Resp: 18 SpO2: 94 % O2 Device: None (Room air)    Vital Signs with Ranges  Temp:  [99  F (37.2  C)] 99  F (37.2  C)  Pulse:  [62-79] 73  Heart Rate:  [63-84] 71  Resp:  [14-28] 18  BP: (112-131)/(62-81) 131/81  SpO2:  [90 %-99 %] 94 %  215 lbs 0 oz    Constitutional: Awake, alert, cooperative, no apparent distress.  Eyes: Conjunctiva and pupils examined and normal.  HEENT: Moist mucous membranes, normal dentition.  Respiratory: Clear to auscultation bilaterally, no crackles or wheezing.  Cardiovascular: Regular rate and rhythm, normal S1 and S2, and 2/6 systolic murmur at left sternal border, no carotid bruits.  1+ bilatera ankle edema.   GI: Soft, non-distended, non-tender, normal bowel sounds.  Lymph/Hematologic: No anterior cervical, supraclavicular or axillary adenopathy.  Skin: No rashes, no cyanosis.   Musculoskeletal: No joint swelling, erythema or tenderness.  Neurologic: Cranial nerves 2-12 intact, right  5-/5, left 4+/5, but generalized  weakness   Psychiatric: Alert, Ox3, no obvious anxiety or depression.    Data   Data reviewed today:  I personally reviewed the EKG tracing showing Atrial fib with rate of 76.  Recent Labs   Lab 04/11/19  1459 04/11/19  1314   WBC  --  11.2*   HGB  --  12.8*   MCV  --  91   PLT  --  178   NA  --  141   POTASSIUM  --  3.9   CHLORIDE  --  108   CO2  --  25   BUN  --  25   CR  --  1.20   ANIONGAP  --  8   JESU  --  8.9   GLC  --  152*   TROPI 0.097* 0.089*       Imaging:  Recent Results (from the past 24 hour(s))   XR Chest 2 Views    Narrative    CHEST TWO VIEWS   4/11/2019 1:59 PM     HISTORY: Weakness.    COMPARISON: None.      Impression    IMPRESSION: Two views of the chest are performed. Small right and  trace left pleural effusions are present with right lung base  atelectasis or infiltrate is noted. A few patchy airspace opacities  are also noted at the left lung base. Pneumonia is possible. Heart is  normal in size. No pneumothorax.    SILVINO LANDAVERDE MD

## 2019-04-11 NOTE — PHARMACY-ADMISSION MEDICATION HISTORY
Admission medication history interview status for the 4/11/2019  admission is complete. See EPIC admission navigator for prior to admission medications     Medication history source reliability:Good  - List per pt report, detailed list from daughters, and per pharmacist at [facility].     1) Changed dose of Lisinopril per [facility] pharmacy    Actions taken by pharmacist (provider contacted, etc):None     Additional medication history information not noted on PTA med list :None    Medication reconciliation/reorder completed by provider prior to medication history? No    Time spent in this activity: 25 min    Prior to Admission medications    Medication Sig Last Dose Taking? Auth Provider   amLODIPine (NORVASC) 5 MG tablet Take 5 mg by mouth daily 4/11/2019 at am Yes Reported, Patient   atorvastatin (LIPITOR) 20 MG tablet Take 20 mg by mouth At Bedtime  4/10/2019 at hs Yes Reported, Patient   cholecalciferol 1000 units TABS Take 2,000 Units by mouth daily  4/11/2019 at am Yes Reported, Patient   clopidogrel (PLAVIX) 75 MG tablet Take 75 mg by mouth daily 4/11/2019 at am Yes Reported, Patient   fish oil-omega-3 fatty acids 1000 MG capsule Take 1 g by mouth daily 4/11/2019 at am Yes Unknown, Entered By History   lisinopril (PRINIVIL/ZESTRIL) 20 MG tablet Take 20 mg by mouth daily 4/11/2019 at am Yes Unknown, Entered By History   metFORMIN (GLUCOPHAGE) 500 MG tablet Take 500 mg by mouth 2 times daily (with meals) 4/11/2019 at x1 Yes Reported, Patient

## 2019-04-11 NOTE — ED TRIAGE NOTES
EMS report called for increase in generalized weakness and fatigues since this morning. Found to be in a.fib which is not known to be in patient's history.

## 2019-04-12 NOTE — PROGRESS NOTES
His chest x ray  Indicated pleural effusion , will stop fluids, due to underling infection concerns wont give lasix unless oxygen needs worsens further.

## 2019-04-12 NOTE — PLAN OF CARE
DATE & TIME:4/11/19   ORIENTATION:O/A#4, forgetful, cooperative  BEHAVIOR & AGGRESSION TOOL COLOR:green  ABNL VS/O2:VSS on RA,   MOBILITY:up w/1/GB/walker, weak  PAIN MANAGMENT:denies  DIET:ADA, good po  BOWEL/BLADDER:, occ incont of urine, increased frequency, uses urinal  ABNL LAB/BG:serial trops-0.089/0.097, BNP-10,436, bg 141/182  DRAIN/DEVICES:PIV  TELEMETRY RHYTHM:a-fib w/CVR  SKIN:bruised, scabbed, abrasion, sm surgical wounds on forehead/nose, abd redness  TESTS/PROCEDURES:Echo/Cards/PT tomorrow  D/C DAY/GOALS/PLACE:pending progress  OTHER IMPORTANT INFO:

## 2019-04-12 NOTE — PROGRESS NOTES
04/12/19 1459   Quick Adds   Type of Visit Initial PT Evaluation   Living Environment   Lives With alone   Living Arrangements apartment   Home Accessibility no concerns   Transportation Anticipated family or friend will provide   Living Environment Comment elevator to the 5th floor on IND living, need to walk about 1/2 block to apt from outside   Self-Care   Usual Activity Tolerance good   Current Activity Tolerance fair   Regular Exercise Yes   Activity/Exercise Type strength training  (5lb weight 2x/day and supine strengthening ex )   Exercise Amount/Frequency daily   Equipment Currently Used at Home walker, rolling   Activity/Exercise/Self-Care Comment pt reports had home PT 1x/week for 1 month after a fall at home (ended in )3/2019   Functional Level Prior   Ambulation 1-->assistive equipment   Transferring 1-->assistive equipment   Toileting 1-->assistive equipment   Bathing 1-->assistive equipment   Communication 0-->understands/communicates without difficulty   Swallowing 0-->swallows foods/liquids without difficulty   Cognition 0 - no cognition issues reported   Fall history within last six months yes   Number of times patient has fallen within last six months 3   Which of the above functional risks had a recent onset or change? fall history;transferring;ambulation   General Information   Onset of Illness/Injury or Date of Surgery - Date 01/11/19   Referring Physician Ryan Danielle MD   Patient/Family Goals Statement get back to home   Pertinent History of Current Problem (include personal factors and/or comorbidities that impact the POC)  87y/o M with hx of stroke with residual left sided weakness (15years ago-per pt), admitted with generalized weakness for 1 day, new onset of a-fib with elevated troponin and UTI in addition to acute diastolic CHF exacerbation resulting in acute hypoxic resp failure-requiring 3L NC currently. PMHx:  mild aortic stenosis, DM   Precautions/Limitations fall  precautions;oxygen therapy device and L/min  (3L currently)   General Observations resting in bed, NAD, daughter present at bedside   General Info Comments Activity order: up with assist   Cognitive Status Examination   Orientation orientation to person, place and time   Level of Consciousness alert   Follows Commands and Answers Questions 100% of the time   Pain Assessment   Patient Currently in Pain No  (pt denies any pain)   Range of Motion (ROM)   ROM Comment WFL bilateral UE/LE   Strength   Strength Comments right LE: 5/5, left LE: 4/5, right UE: shoulder abd/flex:4/5 otherwise 5/5, left UE: 4/5   Bed Mobility   Bed Mobility Comments mod A with supine to sit from left side of bed with railing   Transfer Skills   Transfer Comments sit to stand with mod A with multiple attempts to stand initially pulling up on the walker-required assist for balance for safety with transition and inital standing balance.    Gait   Gait Comments amb 10 ft with mod A x 1 for safety with balance and 2nd person to manage IV O2 (3L NC) and IV pole   Balance   Balance Comments min A with initial sititng balance, min A with standing balance with bilateral UE support on the walker.    Sensory Examination   Sensory Perception Comments denies numbness/tingling    Muscle Tone   Muscle Tone Comments decreased tone on left UE compared to right UE   General Therapy Interventions   Planned Therapy Interventions balance training;bed mobility training;gait training;strengthening;neuromuscular re-education;transfer training   Clinical Impression   Criteria for Skilled Therapeutic Intervention yes, treatment indicated   PT Diagnosis impaired gait   Influenced by the following impairments impaired balance, impaired strength, impaired activity tolerance   Functional limitations due to impairments impaired independence with mobility   Clinical Presentation Stable/Uncomplicated   Clinical Presentation Rationale based on clinical judgement   Clinical  "Decision Making (Complexity) Low complexity   Therapy Frequency` daily   Predicted Duration of Therapy Intervention (days/wks) 1 week   Anticipated Discharge Disposition Transitional Care Facility   Risk & Benefits of therapy have been explained Yes   Patient, Family & other staff in agreement with plan of care Yes   [facility] TM \"6 Clicks\"   2016, Trustees of [facility], under license to [employer].  All rights reserved.   6 Clicks Short Forms Basic Mobility Inpatient Short Form   [facility]  \"6 Clicks\" V.2 Basic Mobility Inpatient Short Form   1. Turning from your back to your side while in a flat bed without using bedrails? 3 - A Little   2. Moving from lying on your back to sitting on the side of a flat bed without using bedrails? 2 - A Lot   3. Moving to and from a bed to a chair (including a wheelchair)? 2 - A Lot   4. Standing up from a chair using your arms (e.g., wheelchair, or bedside chair)? 2 - A Lot   5. To walk in hospital room? 2 - A Lot   6. Climbing 3-5 steps with a railing? 2 - A Lot   Basic Mobility Raw Score (Score out of 24.Lower scores equate to lower levels of function) 13   Total Evaluation Time   Total Evaluation Time (Minutes) 12     "

## 2019-04-12 NOTE — CONSULTS
Electrophysiology/ Cardiology- Consult Note         H&P and Plan:     Reason for consult: Atrial fibrillation.    History of present illness: 86-year-old gentleman with a history of hypertension, diabetes, previous CVA (15 years ago, residual left-sided weakness) and non-Hodgkin lymphoma, who was admitted with generalized weakness and during hospital stay he was found to a UTI and persistent atrial fibrillation.  EP was consulted to help with management.    Patient is unable to give more information about atrial fibrillation as he seems to be asymptomatic with A. fib.  He denies palpitations, lightheadedness, near-syncope or syncope.  The last EKG in our system is dated back in 2016 which was normal sinus rythm.    He is currently not taking any AV gosia agents and heart rates well controlled.  He is taking aspirin and Plavix for due to previous history of stroke.    Echocardiogram was obtained this morning.  Reports to pending.  On my review, cardiac function seems to be normal.  There is a severe left atrial enlargement which suggests that the atrial rythm is most likely chronic for him.  There is also mild aortic stenosis.      Plan:  1.  Persistent atrial fibrillation.  He seems to be asymptomatic.  Heart rate is well controlled therefore I believe his symptoms are related to ongoing UTI.  Due to severe enlarged left atrium, most likely is a chronic problem for him.  I favor continue rate control strategy and recommend starting a small dose of metoprolol XL (12.5 mg daily).  He should follow-up with general cardiology as an outpatient with 3 days ziopatch monitor to evaluate outpatient heart rates.  2.  Embolic prevention.  Chads vas score of 6.  No history of bleeding/GI bleeds to contraindicate use of oral anticoagulation.  Therefore, I recommend stopping aspirin and Plavix and start Eliquis.  3.  Hypertension.  Blood pressures well controlled.  Continue therapy with amlodipine and lisinopril.  We will add  "small dose of beta-blocker.  4.  Mild aortic stenosis. No acute issues.  He should follow-up with general cardiologist.    We will sign off.  Please call with questions.    Stephen Bentley MD    Physical Exam:  Vitals: /74 (BP Location: Right arm)   Pulse 75   Temp 99.8  F (37.7  C) (Oral)   Resp 20   Ht 1.778 m (5' 10\")   Wt 92.2 kg (203 lb 4.2 oz)   SpO2 93%   BMI 29.17 kg/m        Intake/Output Summary (Last 24 hours) at 4/12/2019 1013  Last data filed at 4/12/2019 0900  Gross per 24 hour   Intake 240 ml   Output 875 ml   Net -635 ml     Vitals:    04/11/19 1307 04/12/19 0500   Weight: 97.5 kg (215 lb) 92.2 kg (203 lb 4.2 oz)       Constitutional:  AAO x3.  Pt is in NAD.  HEAD: Normocephalic.  SKIN: Skin normal color, texture and turgor with no lesions or eruptions.  Eyes: PERRL, EOMI.  ENT:  Supple, normal JVP. No lymphadenopathy or thyroid enlargement.  Chest:  CTAB.  Cardiac:  IIR, variable sound of S1 and Normal S2. Systolic murmur in LUSB II/VI.  Abdomen:  Normal BS.  Soft, non-tender and non-distended.  No rebound or guarding.    Extremities:  Pedious pulses palpable B/L.  Trace LE edema noticed.   Neurological: Strength and sensation grossly symmetric and intact throughout.         Review of Systems:  Complete review of system is otherwise negative with the exception of what was described above.     CURRENT MEDICATIONS:    amLODIPine  5 mg Oral Daily     aspirin  81 mg Oral Daily     atorvastatin  20 mg Oral At Bedtime     cefTRIAXone  1 g Intravenous Q24H     clopidogrel  75 mg Oral Daily     furosemide  40 mg Intravenous BID     heparin  5,000 Units Subcutaneous Q12H     insulin aspart  1-10 Units Subcutaneous TID AC     insulin aspart  1-7 Units Subcutaneous At Bedtime     lisinopril  20 mg Oral Daily     PRN Meds: acetaminophen, glucose **OR** dextrose **OR** glucagon, melatonin, naloxone, ondansetron **OR** ondansetron, polyethylene glycol    ALLERGIES   No Known Allergies    PAST MEDICAL " HISTORY:  Past Medical History:   Diagnosis Date     Cerebral infarction (H) 2004    Residual left hemiparesis, on Plavix     Detached retina, left      DM2 (diabetes mellitus, type 2) (H) 2000     Hypertension      Lymphoma 2008    treated with Chemo/Rad in 2008, recurrence 2017 tx'd with chemo       PAST SURGICAL HISTORY:  Past Surgical History:   Procedure Laterality Date     APPENDECTOMY       BACK SURGERY       EYE SURGERY      cataracts       FAMILY HISTORY:  Family History   Problem Relation Age of Onset     Abdominal Aortic Aneurysm Mother          at age 99     Myocardial Infarction Father          age 65       SOCIAL HISTORY:  Social History     Socioeconomic History     Marital status:      Spouse name: None     Number of children: 3     Years of education: None     Highest education level: None   Occupational History     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   Social Needs     Financial resource strain: None     Food insecurity:     Worry: None     Inability: None     Transportation needs:     Medical: None     Non-medical: None   Tobacco Use     Smoking status: Former Smoker     Last attempt to quit: 1980     Years since quittin.3     Smokeless tobacco: Never Used   Substance and Sexual Activity     Alcohol use: Yes     Comment: 1 drink a week     Drug use: Never     Sexual activity: None   Lifestyle     Physical activity:     Days per week: None     Minutes per session: None     Stress: None   Relationships     Social connections:     Talks on phone: None     Gets together: None     Attends Hinduism service: None     Active member of club or organization: None     Attends meetings of clubs or organizations: None     Relationship status: None     Intimate partner violence:     Fear of current or ex partner: None     Emotionally abused: None     Physically abused: None     Forced sexual activity: None   Other Topics Concern     None   Social History Narrative     , has 3 children, lives by himself in an apartment.  Has living will, desires DNR/DNI.  (last updated 4/11/2019)          Recent Lab Results:  Recent Labs   Lab 04/12/19  0626 04/11/19  2204 04/11/19  1459 04/11/19  1314   WBC  --   --   --  11.2*   HGB  --   --   --  12.8*   MCV  --   --   --  91   PLT  --   --   --  178     --   --  141   POTASSIUM 3.5  --   --  3.9   CHLORIDE 110*  --   --  108   CO2 24  --   --  25   BUN 24  --   --  25   CR 1.07  --   --  1.20   ANIONGAP 9  --   --  8   JESU 8.3*  --   --  8.9   *  --   --  152*   TROPI 0.145* 0.111* 0.097* 0.089*

## 2019-04-12 NOTE — PLAN OF CARE
PT: PT consult received. Chart reviewed. 87y/o M admitted yesterday with UTI in the setting of new onset a-fib. Pt chart, required increased O2 needs at night, also with elevated trop. Will await cardiology consult prior to PT evaluation.

## 2019-04-12 NOTE — CONSULTS
Received Standing Order for CHF diet education.     Unable to meet with patient at this time. Will follow up for diet teaching Monday.    Mariia Valera RD, LD  Clinical Dietitian

## 2019-04-12 NOTE — PROVIDER NOTIFICATION
MD Notification    Notified Person: MD    Notified Person Name:  Iam    Notification Date/Time: 04/12/19 0150    Notification Interaction:    Purpose of Notification: increasing O2 needs, tachypnea, crackles in lung bases, fluids currently at 50 ml/hr. Changes to orders?    Orders Received: stop fluids, chest xray ordered    Comments:

## 2019-04-12 NOTE — PLAN OF CARE
Discharge Planner PT   Patient plan for discharge: return home  Current status: PT consult received for weakness and physical deconditioning. Chart reviewed. 87y/o M with hx of stroke with residual left sided weakness (15years ago-per pt), admitted with generalized weakness for 1 day, new onset of a-fib with elevated troponin and UTI in addition to acute diastolic CHF exacerbation resulting in acute hypoxic resp failure-requiring 3L NC currently.    Mod A with supine to sit from left side of bed, min A with initial sitting balance, sit to stand x 4 reps during session with min to mod A x 1 with cues for safety and balance with FWW. Pt required assist with use of his urinal and kelton/doff depends at the EOB and assist with balance. Amb 40ft with FWW with mod A x 1 for safety with balance and 2nd person to manage O2 and IV pole, cues to stay inside the walker and required assist to regain his balance x 2 reps due to posterior LOB. Pt on 3L NC with mobility with SPO2=94%. Pt fatigued with mobility.    Barriers to return to prior living situation: level of assist, limited ability for pt's family to assist 24/7, lives alone, fall risk, impaired balance  Recommendations for discharge: TCU  Rationale for recommendations: Pt will benefit from TCU to maximize his safety with his level of mobility prior to return home alone. Pt currently prefers to return home. If discharge home, recommend home PT/OT/RN and 24/7 assist with all aspects of mobility for safety. Pt also would benefit from OT assessment for safety with ADLs while in the hospital.        Entered by: Nya Latif 04/12/2019 3:34 PM

## 2019-04-12 NOTE — PROVIDER NOTIFICATION
Paged Dr Couch regarding critical trop of 0.145. Denies chest pain or pressure. Order received to transfer to heart center.

## 2019-04-12 NOTE — PROGRESS NOTES
Report given to [facility] nurse Jennifer. Belongings and insulin pen accounted for and sent with to [facility]. Updated daughter Evelina regarding transfer to heart center.

## 2019-04-12 NOTE — PROGRESS NOTES
MD Notification    Notified Person: MD    Notified Person Name:Dr Vitale    Notification Date/Time:2220    Notification Interaction:spoke on phone    Purpose of Notification:IVF running at 75cc/h? Hx of CHF     Orders Received:Rate down to 50cc/h    Comments:

## 2019-04-12 NOTE — PLAN OF CARE
DATE & TIME:4/12/19 0435  ORIENTATION:A&Ox4, forgetful, pleasant and cooperative  BEHAVIOR & AGGRESSION TOOL COLOR:green  ABNL VS/O2:VSS on on 3 L O2, sats 92-93 on 3L   MOBILITY:up w/1/GB/walker, weak, hx of CVA, L-sided hemiparesis  PAIN MANAGMENT:denies, no chest pain for shift.  DIET:mod carb diet  BOWEL/BLADDER: frequence, occ incont of urine, uses urinal otherwise. PVR 0.  ABNL LAB/BG:serial trops-midnight trop 0.111, last today at 0600.  BNP 10,436, bg 163  DRAIN/DEVICES:PIV SL   TELEMETRY RHYTHM:a-fib w/CVR  SKIN:bruised, scabbed, abrasion, sm surgical wounds on forehead/nose, mepilex on elbow, abd redness/rash  TESTS/PROCEDURES:Echo today. Cardiology and PT consulted.  D/C DAY/GOALS/PLACE:discharge 2-3 days possibly to a TCU pending progress and PT eval of weakness.  OTHER IMPORTANT INFO: IV fluids dc'd overnight due to increased O2 needs, tachypnea. Lung bases with crackles. Xray-pleural effusion. Per MD, due to UTI lasix only if O2 needs increase. On IV rocephin for UTI.

## 2019-04-12 NOTE — PROGRESS NOTES
[facility]    Medicine Progress Note - Hospitalist Service       Date of Admission:  4/11/2019  Assessment & Plan   Renato Mccartney is a 86 year old male with DM type 2, HTN, hx of CVA with slight residual left hemiparesis who presents with generalized weakness for 1 day and found to have new onset atrial fibrillation, elevated troponin and UTI.      Atrial fibrillation with RVR -- new Onset. Telemetry shows persistent atrial fibrillation. Echo shows atrial enlargement. CHADS vasc score is 6. Echo shows normal EF with diastolic dysfunction and no regional wall motion abnormalities. TSH was normal.  Troponin was mildly elevated and increased from 0.097 on admission to 0.145 on 4/12.  -- continue telemetry  -- cardiology consult appreciated  -- mildly elevated troponin thought to be secondary to A. Fib and heart failure (no regionals or angina symptoms to suggest CAD and no additional testing requested by cardiology)  -- metoprolol XL 12.5 mg daily (started this stay)  -- recommend follow up with cardiology as an outpatient with 3 days ziopatch monitor to evaluated outpatient heart rates  -- stopping aspirin and plavix and starting eliquis this stay    Acute diastolic CHF exacerbation resulting in acute hypoxic respiratory failure -- Right pleural effusion on chest x-ray. Echo shows diasolic dysfunction with normal EF and no regionals. Bilateral leg edema present on admission.  -- continue Lasix 40 mg IV BID  -- continue metoprolol (added this stay)  -- continue home lisinopril  -- strict I/O's and daily weights  -- continue oxygen support and wean as tolerated  -- BMP in AM     Urinary tract infection (cystitis with microscopic hematuria). UA has >182 WBC's.  -- continue IV Ceftriaxone  -- follow up urine culture and adjust antibiotics    Physical deconditioning  -PT/OT and SW consult  -may need TCU    Nocturia x 4-5, Probable BPH   -- check PVR with bladder scanner as needed     Mild aortic  stenosis.  -- will need to follow with cardiology as outpatient for monitoring every few years     Diabetes mellitus, type 2 (H). Uncontrolled with A1c of 8.3. Takes metformin 500 mg BID at home.  -- Hyperglycemia  -- holding metformin while in the hospital (plan to discharge on higher dose of 1000 mg BID and continue to work on his diet and exercise)  -- Continue sliding scale insulin     HTN (hypertension)  -- continue home lisinopril and amlodipine  -- added metoprolol this stay      Diet: Moderate Consistent CHO Diet    DVT Prophylaxis: Heparin SQ  Eric Catheter: not present  Code Status: DNR/DNI      Disposition Plan   Expected discharge: 1-2 days, recommended to transitional care unit once O2 use less than 1 liters/minute.  Entered: Ad Couch MD 04/12/2019, 1:23 PM     The patient's care was discussed with the Bedside Nurse, Patient and Patient's Family.    Ad Couch MD  Hospitalist Service  [facility]    ______________________________________________________________________    Interval History   Shortness of breath and weakness continue. No chest pain.  No fevers. No nausea or vomiting. Family at bedside. Has not been on oxygen before.    Data reviewed today: I reviewed all medications, new labs and imaging results over the last 24 hours. I personally reviewed no images or EKG's today.    Physical Exam   Vital Signs: Temp: 98.4  F (36.9  C) Temp src: Oral BP: 116/73 Pulse: 77 Heart Rate: 83 Resp: 18 SpO2: 94 % O2 Device: Nasal cannula Oxygen Delivery: 3 LPM  Weight: 203 lbs 4.23 oz  Constitutional: Awake, alert, cooperative, mild labored breathing  Respiratory: Crackles in both bases worse in the right lung, no wheezing  Cardiovascular: Regular rate and irregularly irregular rhythm, normal S1 and S2, and 3/6 systolic murmur best heard in aortic area  GI: Normal bowel sounds, soft, non-distended, non-tender  Skin/Integumen: No rashes, no cyanosis, 1+ peripheral pitting edema  in legs bilaterally  Other:    Data   Recent Labs   Lab 04/12/19  0626 04/11/19  2204 04/11/19  1459 04/11/19  1314   WBC  --   --   --  11.2*   HGB  --   --   --  12.8*   MCV  --   --   --  91   PLT  --   --   --  178     --   --  141   POTASSIUM 3.5  --   --  3.9   CHLORIDE 110*  --   --  108   CO2 24  --   --  25   BUN 24  --   --  25   CR 1.07  --   --  1.20   ANIONGAP 9  --   --  8   JESU 8.3*  --   --  8.9   *  --   --  152*   TROPI 0.145* 0.111* 0.097* 0.089*     Recent Results (from the past 24 hour(s))   XR Chest 2 Views    Narrative    CHEST TWO VIEWS   4/11/2019 1:59 PM     HISTORY: Weakness.    COMPARISON: None.      Impression    IMPRESSION: Two views of the chest are performed. Small right and  trace left pleural effusions are present with right lung base  atelectasis or infiltrate is noted. A few patchy airspace opacities  are also noted at the left lung base. Pneumonia is possible. Heart is  normal in size. No pneumothorax.    SILVINO LANDAVERDE MD   XR Chest Port 1 View    Narrative    XR CHEST PORT 1 VW  4/12/2019 2:41 AM     HISTORY:  Shortness of breath    COMPARISON: Film dated 4/11/2019    FINDINGS:  The heart is enlarged. Moderate size right pleural effusion  is again noted. There is associated right basilar infiltrate or  atelectasis. Left lung remains clear.      Impression    IMPRESSION: Stable. No significant change in the right pleural  effusion and associated infiltrate or atelectasis.    SHANELLE RITCHIE MD     Medications       amLODIPine  5 mg Oral Daily     apixaban ANTICOAGULANT  5 mg Oral BID     atorvastatin  20 mg Oral At Bedtime     cefTRIAXone  1 g Intravenous Q24H     furosemide  40 mg Intravenous BID     insulin aspart  1-10 Units Subcutaneous TID AC     insulin aspart  1-7 Units Subcutaneous At Bedtime     lisinopril  20 mg Oral Daily     metoprolol succinate ER  12.5 mg Oral Daily

## 2019-04-13 PROBLEM — I35.0 AORTIC STENOSIS: Status: ACTIVE | Noted: 2019-01-01

## 2019-04-13 NOTE — PLAN OF CARE
Discharge Planner PT   Patient plan for discharge: return home  Current status: Pt sitting up in chair, RN reports just removed O2. Pt's SPO2 monitored throughout session with mobility, difficulty getting a read intermittently, ranging from 88-94% on RA. Amb 45ft+50ft with min to mod A due to impaired balance, cues to stay inside the walker and cues to slow down with turning due to gross LOB posteriorly with turning. Worked on safety with sit to stand required min to mod A with frequent posterior LOB, cues for hand placement with FWW. Pt instructed with standing balance ex with min A for balance and FWW. HR=80's to low 100s throughout session.   Barriers to return to prior living situation: level of assist, limited ability for pt's family to assist 24/7, lives alone, fall risk, impaired balance  Recommendations for discharge: TCU  Rationale for recommendations: Pt will benefit from TCU to maximize his safety with his level of mobility prior to return home alone. Pt currently prefers to return home. If discharge home, recommend home PT/OT/RN and 24/7 assist with all aspects of mobility for safety. Pt also would benefit from OT assessment for safety with ADLs while in the hospital.            Entered by: Nya Latif 04/13/2019 8:49 AM

## 2019-04-13 NOTE — PLAN OF CARE
Discharge Planner OT   Patient plan for discharge: hopes to return home to his apt but open to TCU if needed  Current status: OT/cardiac rehab eval completed and treatment initiated on 87yo male admitted with new onset a-fib, UTI and acute CHF exacerbation. Pt required Min A with amb in room using FWW, Min-Mod A with LB dressing and toileting task d/t SOB when bending down to manage clothing/Depends. VSS on RA. At completion /73, HR 78, 02 96% on RA.  OT/CR to see daily to advance activity and ADLs with skilled monitoring of cardiac response.   Barriers to return to prior living situation: lives alone, current level of A, decreased balance, weakness  Recommendations for discharge: TCU  Rationale for recommendations: pt will benefit from continued therapies to advance safety and indep with mobilities and ADLs prior to return home to [place]. Should pt progress during stay to be able to return home would then recommend home OT assessment to further address modified ADL strategies within home to help prevent further exacerbation of disease and to promote safety within home.       Entered by: Carl Andino 04/13/2019 12:07 PM

## 2019-04-13 NOTE — PROGRESS NOTES
[facility]    Medicine Progress Note - Hospitalist Service       Date of Admission:  4/11/2019  Assessment & Plan   Renato Mccartney is a 86 year old male with DM type 2, HTN, hx of CVA with slight residual left hemiparesis who presents with generalized weakness for 1 day and found to have new onset atrial fibrillation, elevated troponin and UTI.      Atrial fibrillation with RVR -- new Onset. Telemetry shows persistent atrial fibrillation. Echo shows atrial enlargement. CHADS vasc score is 6. Echo shows normal EF with diastolic dysfunction and no regional wall motion abnormalities. TSH was normal.  Troponin was mildly elevated and increased from 0.097 on admission to 0.145 on 4/12.  -- continue telemetry  -- cardiology consult appreciated  -- mildly elevated troponin thought to be secondary to A. Fib and heart failure (no regionals or angina symptoms to suggest CAD and no additional testing requested by cardiology)  -- metoprolol XL 12.5 mg daily (started this stay)  -- recommend follow up with cardiology as an outpatient with 3 days ziopatch monitor to evaluated outpatient heart rates  -- stopping aspirin and plavix and starting eliquis this stay    Acute diastolic CHF exacerbation resulting in acute hypoxic respiratory failure -- Right pleural effusion on chest x-ray. Echo shows diasolic dysfunction with normal EF and no regionals. Bilateral leg edema present on admission.  -- switch Lasix 40 mg IV BID to 40 mg PO bid.   -- continue metoprolol  -- continue home lisinopril  -- strict I/O's and daily weights  -- continue oxygen support and wean as tolerated  -- BMP in AM    Mild Aortic Stenosis     Urinary tract infection (cystitis with microscopic hematuria). UA has >182 WBC's.  -- on IV Ceftriaxone, UC with 2 strains of sensitive E. Coli  -- Switch to Keflex 500 mg bid for an additional 5 days  -- add Flomax to help with urinary frequency (?PVR ordered ?results)    Physical deconditioning  -PT/OT  and SW consult  -will TCU    Nocturia x 4-5, Probable BPH   -- check PVR with bladder scanner as needed, will add Flomax     Mild aortic stenosis.  -- will need to follow with cardiology as outpatient for monitoring every few years     Diabetes mellitus, type 2 (H). Uncontrolled with A1c of 8.3. Takes metformin 500 mg BID at home.  -- Hyperglycemia  -- holding metformin while in the hospital (plan to discharge on higher dose of 1000 mg BID and continue to work on his diet and exercise)  -- Continue sliding scale insulin     HTN (hypertension)  -- continue home lisinopril, will stop Amlodipine given lower BP  -- added metoprolol this stay    Hypokalemia  -- replace potassium      Diet: Combination Diet 0350-4029 Calories: Moderate Consistent CHO (4-6 CHO units/meal); 2 gm NA Diet; No Caffeine for 24 hours (Once tests completed, may have caffeine)    DVT Prophylaxis: Heparin SQ  Eric Catheter: not present  Code Status: DNR/DNI      Disposition Plan   Expected discharge: tomorrow, recommended to transitional care unit once O2 use less than 1 liters/minute.  Entered: Ryan Dorsey MD 04/13/2019, 11:54 PM     The patient's care was discussed with the Bedside Nurse, Patient and Patient's Family.    Ryan Dorsey MD   Pager: [phone]  Cell Phone:  [phone]   Hospitalist Service  [facility]    ______________________________________________________________________    Interval History   Breathing much better, is hoping to go home, but PT and OT both advise TCU given his weakness.     Physical Exam   Vital Signs: Temp: 98.5  F (36.9  C) Temp src: Oral BP: 100/58 Pulse: 77 Heart Rate: 76 Resp: 18 SpO2: 93 % O2 Device: Nasal cannula Oxygen Delivery: 3 LPM  Weight: 192 lbs 8 oz  Constitutional: Awake, alert, cooperative, mild labored breathing  Respiratory: slight crackles at base  Cardiovascular: Regular rate and irregularly irregular rhythm, normal S1 and S2, and 3/6 systolic murmur  best heard in aortic area  GI: Normal bowel sounds, soft, non-distended, non-tender  Skin/Integumen: No rashes, no cyanosis, trace peripheral pitting edema in legs bilaterally  Other:    Data   Recent Labs   Lab 04/13/19  0602 04/12/19  0626 04/11/19  2204 04/11/19  1459 04/11/19  1314   WBC  --   --   --   --  11.2*   HGB  --   --   --   --  12.8*   MCV  --   --   --   --  91   PLT  --   --   --   --  178    143  --   --  141   POTASSIUM 3.1* 3.5  --   --  3.9   CHLORIDE 105 110*  --   --  108   CO2 28 24  --   --  25   BUN 24 24  --   --  25   CR 0.99 1.07  --   --  1.20   ANIONGAP 8 9  --   --  8   JESU 8.3* 8.3*  --   --  8.9   * 152*  --   --  152*   TROPI  --  0.145* 0.111* 0.097* 0.089*     No results found for this or any previous visit (from the past 24 hour(s)).  Medications       amLODIPine  5 mg Oral Daily     apixaban ANTICOAGULANT  5 mg Oral BID     atorvastatin  20 mg Oral At Bedtime     [START ON 4/14/2019] cephALEXin  500 mg Oral BID     [START ON 4/14/2019] furosemide  40 mg Oral BID     insulin aspart  1-10 Units Subcutaneous TID AC     insulin aspart  1-7 Units Subcutaneous At Bedtime     lisinopril  20 mg Oral Daily     metoprolol succinate ER  12.5 mg Oral Daily

## 2019-04-13 NOTE — PLAN OF CARE
Pt up in chair more today. Transfers with assist of 1 with walker and transfer belt. Pt incontinent at times but does use urinal at times. VSS. Remains in afib cvr.

## 2019-04-13 NOTE — PLAN OF CARE
VSS and WNL for this pt. Pt A&Ox4, transfers with A1 -2  with L side weakness. LS diminished on 3 LPM via NC and tele Afib CVR. IV SL, on abx for UTI with sensitivities pending. Pt denies CP or SOB. Plan for discharge pending, will pass on and continue to monitor.

## 2019-04-13 NOTE — PROGRESS NOTES
Care Coordination:    Met with patient, per MD, patient might discharge tomorrow and TCU is recommended. Patient wants to go home, daughter was in room and asked about [facility]. Gave patient list of Skilled Nursing facilities and he started reviewing them then gave to daughter. Will continue to follow.    Daija Viera RN BSN  [facility] Care Coordinator  Phone [phone]

## 2019-04-13 NOTE — PROGRESS NOTES
04/13/19 1148   Quick Adds   Type of Visit Initial Occupational Therapy Evaluation   Living Environment   Lives With alone   Living Arrangements apartment   Home Accessibility no concerns   Transportation Anticipated family or friend will provide   Self-Care   Usual Activity Tolerance good   Current Activity Tolerance fair   Regular Exercise Yes   Activity/Exercise Type strength training   Exercise Amount/Frequency 20 mins;daily   Equipment Currently Used at Home grab bar, tub/shower;grab bar, toilet;tub bench;walker, rolling   Activity/Exercise/Self-Care Comment pt lifts weights for UE strengthening and does leg ROM ex's daily   Functional Level   Ambulation 1-->assistive equipment   Transferring 1-->assistive equipment   Toileting 1-->assistive equipment   Bathing 3-->assistive equipment and person   Dressing 0-->independent   Eating 0-->independent   Communication 0-->understands/communicates without difficulty   Swallowing 0-->swallows foods/liquids without difficulty   Cognition 0 - no cognition issues reported   Fall history within last six months no   Prior Functional Level Comment Pt indep med mgmt, simple meal prep and gets MOW 5x/wk, daughters also provide meals, do driving, errands, cleaning and laundry. Ambulates with FWW at all times. Daughter assists with bathing in walk-in shower, pt seated on bench.    General Information   Onset of Illness/Injury or Date of Surgery - Date 04/11/19   Referring Physician Dr. Danielle   Patient/Family Goals Statement hopes to return home   Additional Occupational Profile Info/Pertinent History of Current Problem 87y/o M with hx of stroke with residual left sided weakness (15years ago-per pt), admitted with generalized weakness for 1 day, new onset of a-fib with elevated troponin and UTI in addition to acute diastolic CHF exacerbation resulting in acute hypoxic resp failure-requiring 3L NC currently. PMHx:  mild aortic stenosis, DM   Precautions/Limitations fall  precautions   Visual Perception   Visual Perception Comments R eye blindness d/t detached retina, L eye 20/20. Able to read menu and board on wall.    Pain Assessment   Patient Currently in Pain No   Range of Motion (ROM)   ROM Comment R WNL; L sadaf approx 160o from old stroke   Strength   Strength Comments R 5/5, L 4/5   Hand Strength   Hand Strength Comments B WFL   Coordination   Coordination Comments B WFL   Transfer Skill: Sit to Stand   Level of [place]: Sit/Stand contact guard   Physical Assist/Nonphysical Assist: Sit/Stand 1 person assist;verbal cues   Assistive Device for Transfer: Sit/Stand rolling walker   Transfer Skill: Toilet Transfer   Level of [place]: Toilet stand-by assist   Physical Assist/Nonphysical Assist: Toilet supervision;verbal cues   Assistive Device seat riser;grab bars  (has lower toilet stool at home)   Balance   Balance Comments Min A w/amb using walker, unsteady but no LOB short distance in room   Lower Body Dressing   Level of [place]: Dress Lower Body moderate assist (50% patients effort)   Physical Assist/Nonphysical Assist: Dress Lower Body 1 person assist;verbal cues;other (see comments)  (due to SOB when bending down)   Toileting   Level of [place]: Toilet moderate assist (50% patients effort)   Physical Assist/Nonphysical Assist: Toilet 1 person assist   Grooming   Level of [place]: Grooming independent   Physical Assist/Nonphysical Assist: Grooming set-up required  (seated BS chair)   Eating/Self Feeding   Level of [place]: Eating independent   Physical Assist/Nonphysical Assist: Eating set-up required   Activities of Daily Living Analysis   Impairments Contributing to Impaired Activities of Daily Living balance impaired;strength decreased   General Therapy Interventions   Planned Therapy Interventions ADL retraining;IADL retraining;home program guidelines;progressive activity/exercise;risk factor education   Clinical Impression   Criteria for  "Skilled Therapeutic Interventions Met yes, treatment indicated   OT Diagnosis decreased ADL/IADL performance   Influenced by the following impairments weakness, impaired activity tolerance   Assessment of Occupational Performance 3-5 Performance Deficits   Identified Performance Deficits functional mobility, household mgmt, social/exercise skill mgmt, dressing, toileting   Clinical Decision Making (Complexity) Low complexity   Therapy Frequency daily   Predicted Duration of Therapy Intervention (days/wks) 5 days   Anticipated Discharge Disposition Transitional Care Facility   Risks and Benefits of Treatment have been explained. Yes   Patient, Family & other staff in agreement with plan of care Yes   [facility] TM \"6 Clicks\"   2016, Trustees of [facility], under license to [employer].  All rights reserved.   6 Clicks Short Forms Daily Activity Inpatient Short Form   [facility]  \"6 Clicks\" Daily Activity Inpatient Short Form   1. Putting on and taking off regular lower body clothing? 3 - A Little   2. Bathing (including washing, rinsing, drying)? 2 - A Lot   3. Toileting, which includes using toilet, bedpan or urinal? 3 - A Little   4. Putting on and taking off regular upper body clothing? 3 - A Little   5. Taking care of personal grooming such as brushing teeth? 4 - None   6. Eating meals? 4 - None   Daily Activity Raw Score (Score out of 24.Lower scores equate to lower levels of function) 19   Total Evaluation Time   Total Evaluation Time (Minutes) 15     "

## 2019-04-14 NOTE — PROGRESS NOTES
Pt ok'd for discharge to TCU. Will leave with Holter monitor on. Family will transport to [facility]. Pt stable at discharge.

## 2019-04-14 NOTE — PROGRESS NOTES
[facility]    Medicine Progress Note - Hospitalist Service       Date of Admission:  4/11/2019  Assessment & Plan   Renato Mccartney is a 86 year old male with DM type 2, HTN, hx of CVA with slight residual left hemiparesis who presents with generalized weakness for 1 day and found to have new onset atrial fibrillation, elevated troponin and UTI.    New diagnosis of Afib. Telemetry shows persistent atrial fibrillation. Echo shows atrial enlargement, normal EF with diastolic dysfunction and no regional wall motion abnormalities.  - TSH was normal.    - CHADS vasc score is 6   - Troponin was mildly elevated and increased from 0.097 on admission to 0.145 on 4/12--thought to be secondary to A. Fib and heart failure (no regionals or angina symptoms to suggest CAD and no additional testing requested by cardiology)  - cardiology consult appreciated  - started on  metoprolol XL 12.5 mg daily  - also started on Eliquis 5 mg po BID; PTA ASA and Plavix stopped this stay  - tele with Afib with controlled rate  - he is feeling fine  - recommend follow up with cardiology as an outpatient with 3 days ziopatch monitor to evaluated outpatient heart rates  - PT/OT rec TCU    Acute diastolic CHF exacerbation resulting in acute hypoxic respiratory failure -- Right pleural effusion on chest x-ray. Echo shows diasolic dysfunction with normal EF and no WMA; Bilateral leg edema present on admission.  - proBNP was 10.436 on admission  - started on iv Lasix; now switched tp Lasix 40 mg po BID  - continue metoprolol  - continue home lisinopril  - strict I/O's and daily weights  - cr stable 1.2--1.07--0.99--1.03  - weaned off O2 now  - follow up with Cardiology in 2-3 weeks    Mild aortic stenosis.  -- will need to follow with cardiology as outpatient for monitoring every few years     Urinary tract infection (cystitis with microscopic hematuria). UA has >182 WBC's.  -- on IV Ceftriaxone, UC with 2 strains of sensitive E.  Coli  -- Switch to Keflex 500 mg bid for an additional 5 days  -- add Flomax to help with urinary frequency (?PVR ordered ?results)    Physical deconditioning  - PT/OT and SW consult  - needs TCU    Nocturia x 4-5, suspected Probable BPH   - added Flomax  - check PVR with bladder scanner as needed; last PVR ~200cc     Diabetes mellitus, type 2 (H). Uncontrolled with A1c of 8.3. Takes metformin 500 mg BID at home.  -- Hyperglycemia  -- holding metformin while in the hospital (plan to discharge on higher dose of 1000 mg BID and continue to work on his diet and exercise)  -- Continue sliding scale insulin     HTN (hypertension)  -- continue home lisinopril, will stop Amlodipine given lower BP  -- added metoprolol this stay    Hypokalemia  -- replace potassium      Diet: Combination Diet 1324-8235 Calories: Moderate Consistent CHO (4-6 CHO units/meal); 2 gm NA Diet; No Caffeine for 24 hours (Once tests completed, may have caffeine)    DVT Prophylaxis: Heparin SQ  Eric Catheter: not present  Code Status: DNR/DNI      Disposition Plan   Expected discharge: medically ready for discharge, recommended to transitional care unit today or tomorrow, pending bed availability    Entered: Merlene Floyd MD 04/14/2019, 11:59 AM     The patient's care was discussed with the Bedside Nurse, Patient and Patient's Family.    Merlene Floyd MD     ______________________________________________________________________    Interval History    Feeling better, denies chest pain, no SOB, no palpitations, no N/V, no abd pain      Physical Exam   Vital Signs: Temp: 99.1  F (37.3  C) Temp src: Oral BP: 134/83 Pulse: 82 Heart Rate: 82 Resp: 16 SpO2: 97 % O2 Device: None (Room air) Oxygen Delivery: 3 LPM  Weight: 188 lbs 9.6 oz  Constitutional: Awake, alert, cooperative, NAD  Respiratory: bilateral air entry, no wheezing, no rales, no crackles  Cardiovascular: irregularly irregular rhythm, 3/6 systolic murmur best heard in aortic  area  GI: Normal bowel sounds, soft, non-distended, non-tender  Skin/Integumen: No rashes, no cyanosis, trace peripheral pitting edema in legs bilaterally  Other:    Data   Recent Labs   Lab 04/14/19  0700 04/13/19  0602 04/12/19  0626 04/11/19  2204 04/11/19  1459 04/11/19  1314   WBC  --   --   --   --   --  11.2*   HGB  --   --   --   --   --  12.8*   MCV  --   --   --   --   --  91     --   --   --   --  178    141 143  --   --  141   POTASSIUM 4.5 3.1* 3.5  --   --  3.9   CHLORIDE 107 105 110*  --   --  108   CO2 29 28 24  --   --  25   BUN 27 24 24  --   --  25   CR 1.03 0.99 1.07  --   --  1.20   ANIONGAP 6 8 9  --   --  8   JESU 8.6 8.3* 8.3*  --   --  8.9   * 143* 152*  --   --  152*   TROPI  --   --  0.145* 0.111* 0.097* 0.089*     No results found for this or any previous visit (from the past 24 hour(s)).  Medications       apixaban ANTICOAGULANT  5 mg Oral BID     atorvastatin  20 mg Oral At Bedtime     cephALEXin  500 mg Oral BID     furosemide  40 mg Oral BID     insulin aspart  1-10 Units Subcutaneous TID AC     insulin aspart  1-7 Units Subcutaneous At Bedtime     lisinopril  20 mg Oral Daily     metoprolol succinate ER  12.5 mg Oral Daily     tamsulosin  0.4 mg Oral Daily

## 2019-04-14 NOTE — DISCHARGE SUMMARY
[facility]    Discharge Summary  Hospitalist    Date of Admission:  4/11/2019  Date of Discharge:  4/14/2019  2:40 PM  Discharging Provider: Merlene Floyd MD  Date of Service (when I saw the patient): 04/14/19    Discharge Diagnoses   Afib- new diagnosis  Diastolic CHF exacerbation   Mild Aortic stenosis  UTI  DM type 2  HTN  Suspected BPH  Deconditioning    History of Present Illness   Renato Mccartney is a 86 year old male with DM type 2, HTN, hx of CVA with slight residual left hemiparesis who presents with generalized weakness for 1 day and found to have new onset atrial fibrillation, elevated troponin and UTI; for a detailed HPI- please refer to H&P done by Dr Ryan Danielle on 04/11/2019.    Hospital Course   Renato Mccartney was admitted on 4/11/2019.  The following problems were addressed during his hospitalization:    New diagnosis of Afib. Telemetry shows persistent atrial fibrillation. Echo shows atrial enlargement, normal EF with diastolic dysfunction and no regional wall motion abnormalities.  - TSH was normal.    - CHADS vasc score is 6   - Troponin was mildly elevated and increased from 0.097 on admission to 0.145 on 4/12--thought to be secondary to A. Fib and heart failure (no reported chest pain/angina and Echo with no WMA to suggest CAD and no additional testing requested by cardiology)  - cardiology consult appreciated  - started on  metoprolol XL 12.5 mg daily  - also started on Eliquis 5 mg po BID; PTA ASA and Plavix stopped this stay  - tele with Afib with controlled rate  - PT/OT rec TCU  - Holter monitor ordered at the time of the discharge to evaluated outpatient heart rates and follow up with Cardiology as scheduled.    Acute diastolic CHF exacerbation resulting in acute hypoxic respiratory failure   - proBNP in ER was 10.436 on admission; Bilateral leg edema present on admission.  - CXR- with small right pleural effusion  -  Echo shows diasolic dysfunction with normal  EF and no WMA;   - started on iv Lasix; now switched to Lasix 40 mg po BID  - continue newly started Toprol XL 12.5 mg po daily  - continue PTA lisinopril  - cr stable 1.2--1.07--0.99--1.03  - weaned off O2 now  - follow up with Cardiology in 2-3 weeks     Mild aortic stenosis.  -- will need to follow with cardiology as outpatient for monitoring every few years     Urinary tract infection (cystitis with microscopic hematuria).   - UA on admission was abnormal with>182 WBC's; UC with 2 strains of sensitive E. Coli  - started on IV Ceftriaxone on admission and switched to Keflex 500 mg bid   - add Flomax to help with urinary frequency      Physical deconditioning  - PT/OT and SW consult  - needs TCU     Nocturia x 4-5, suspected BPH   - added Flomax  - checked PVR with bladder scanner as needed; last PVR ~200cc  - consider Urology follow up as outpatient     Diabetes mellitus, type 2 (H).   - Uncontrolled with A1c of 8.3; PTA metformin 500 mg BID  - plan to discharge on higher dose of Metformin 1000 mg BID and ISS     HTN   - PTA on Norvasc 5 mg po daily and Lisinopril 5 mg po daily  - here- started on Toprol XL 12.5 mg po daily, continued home lisinopril, and stopped PTA Norvasc       Merlene Floyd MD    Significant Results and Procedures   See below    Echocardiogram 04/12/2019    The left ventricle is normal in size.  The visual ejection fraction is estimated at 55-60%.  Diastolic Doppler findings (E/E' ratio and/or other parameters) suggest left ventricular filling pressures are increased.  No regional wall motion abnormalities noted.  There is sclerosis, calcification, and restriction of the aortic valve opening compatible with mild aortic stenosis.  Mild aortic root dilatation.  The rhythm was atrial fibrillation.  The patient exhibited occasional PVCs.    Pending Results   None    Unresulted Labs Ordered in the Past 30 Days of this Admission     No orders found from 2/10/2019 to 4/12/2019.          Code  Status   DNR / DNI       Primary Care Physician   Physician No Ref-Primary      Discharge Disposition   Discharged to short-term care facility  Condition at discharge: Good    Consultations This Hospital Stay   PHYSICAL THERAPY ADULT IP CONSULT  CARDIOLOGY IP CONSULT  CORE CLINIC EVALUATION IP CONSULT  CARDIAC REHAB IP CONSULT  CARE COORDINATOR IP CONSULT  NUTRITION SERVICES ADULT IP CONSULT  PHYSICAL THERAPY ADULT IP CONSULT  OCCUPATIONAL THERAPY ADULT IP CONSULT    Time Spent on this Encounter   [facility] Merlene Floyd, personally saw the patient today and spent greater than 30 minutes discharging this patient.    Discharge Orders      General info for SNF    Length of Stay Estimate: Short Term Care: Estimated # of Days <30  Condition at Discharge: Improving  Level of care:skilled   Rehabilitation Potential: Good  Admission H&P remains valid and up-to-date: Yes  Recent Chemotherapy: N/A  Use Nursing Home Standing Orders: Yes     Mantoux instructions    Give two-step Mantoux (PPD) Per Facility Policy Yes     Reason for your hospital stay    Atrial fibrillation     Glucose monitor nursing POCT    Before meals and at bedtime     Daily weights    Call Provider for weight gain of more than 2 pounds per day or 5 pounds per week.     Follow Up and recommended labs and tests    Follow up with Nursing home physician in 2-3 days.  The following labs/tests are recommended: BMP.  Follow up with primary care provider after discharge home.  Follow up with Cardiology in 2-3 weeks.     Activity - Up with nursing assistance     DNR/DNI     Physical Therapy Adult Consult    Evaluate and treat as clinically indicated.    Reason:  Deconditioning.     Occupational Therapy Adult Consult    Evaluate and treat as clinically indicated.    Reason:  Deconditioning.     Advance Diet as Tolerated    Follow this diet upon discharge: Orders Placed This Encounter      Combination Diet 0916-5704 Calories: Moderate Consistent CHO (4-6 CHO  units/meal); 2 gm NA Diet; No Caffeine for 24 hours (Once tests completed, may have caffeine)     Discharge Medications   Current Discharge Medication List      START taking these medications    Details   apixaban ANTICOAGULANT (ELIQUIS) 5 MG tablet Take 1 tablet (5 mg) by mouth 2 times daily    Associated Diagnoses: Permanent atrial fibrillation (H)      cephALEXin (KEFLEX) 500 MG capsule Take 1 capsule (500 mg) by mouth 2 times daily for 3 days    Associated Diagnoses: Urinary tract infection without hematuria, site unspecified      furosemide (LASIX) 40 MG tablet Take 1 tablet (40 mg) by mouth 2 times daily    Associated Diagnoses: Congestive heart failure, unspecified HF chronicity, unspecified heart failure type (H)      insulin aspart (NOVOLOG VIAL) 100 UNITS/ML vial Give before meals and before bed:  For Pre-Meal Glucose:  140-189 give 1 unit   190-239 give 2 units   240-289 give 3 units   290-339 give 4 units   = or >340 give 5 units     For Bedtime Glucose  200 - 239 give 1 units   240 - 289 give 1.5 units  290 - 339 give 2 units  = or >340 give 2.5 units  Qty: 10 mL, Refills: 0    Associated Diagnoses: Type 2 diabetes mellitus with complication, without long-term current use of insulin (H)      metoprolol succinate ER (TOPROL-XL) 25 MG 24 hr tablet Take 0.5 tablets (12.5 mg) by mouth daily    Associated Diagnoses: Permanent atrial fibrillation (H)      tamsulosin (FLOMAX) 0.4 MG capsule Take 1 capsule (0.4 mg) by mouth daily    Associated Diagnoses: Benign prostatic hyperplasia with nocturia         CONTINUE these medications which have CHANGED    Details   metFORMIN (GLUCOPHAGE) 500 MG tablet Take 2 tablets (1,000 mg) by mouth 2 times daily (with meals)    Associated Diagnoses: Type 2 diabetes mellitus with complication, without long-term current use of insulin (H)         CONTINUE these medications which have NOT CHANGED    Details   atorvastatin (LIPITOR) 20 MG tablet Take 20 mg by mouth At Bedtime        cholecalciferol 1000 units TABS Take 2,000 Units by mouth daily       fish oil-omega-3 fatty acids 1000 MG capsule Take 1 g by mouth daily      lisinopril (PRINIVIL/ZESTRIL) 20 MG tablet Take 20 mg by mouth daily         STOP taking these medications       amLODIPine (NORVASC) 5 MG tablet Comments:   Reason for Stopping:         clopidogrel (PLAVIX) 75 MG tablet Comments:   Reason for Stopping:             Allergies   No Known Allergies  Data   Most Recent 3 CBC's:  Recent Labs   Lab Test 04/14/19  0700 04/11/19  1314 01/28/19  1527   WBC  --  11.2* 6.1   HGB  --  12.8* 11.9*   MCV  --  91 92    178 161      Most Recent 3 BMP's:  Recent Labs   Lab Test 04/14/19  0700 04/13/19  0602 04/12/19  0626    141 143   POTASSIUM 4.5 3.1* 3.5   CHLORIDE 107 105 110*   CO2 29 28 24   BUN 27 24 24   CR 1.03 0.99 1.07   ANIONGAP 6 8 9   JESU 8.6 8.3* 8.3*   * 143* 152*     Most Recent 2 LFT's:No lab results found.  Most Recent INR's and Anticoagulation Dosing History:  Anticoagulation Dose History     There is no flowsheet data to display.        Most Recent 3 Troponin's:  Recent Labs   Lab Test 04/12/19  0626 04/11/19  2204 04/11/19  1459   TROPI 0.145* 0.111* 0.097*     Most Recent Cholesterol Panel:No lab results found.  Most Recent 6 Bacteria Isolates From Any Culture (See EPIC Reports for Culture Details):  Recent Labs   Lab Test 04/11/19  1451   CULT >100,000 colonies/mL  Escherichia coli  *  10,000 to 50,000 colonies/mL  Strain 2  Escherichia coli  *     Most Recent TSH, T4 and A1c Labs:  Recent Labs   Lab Test 04/11/19  1459 04/11/19  1314   TSH 0.40  --    A1C  --  8.3*     Results for orders placed or performed during the hospital encounter of 04/11/19   XR Chest 2 Views    Narrative    CHEST TWO VIEWS   4/11/2019 1:59 PM     HISTORY: Weakness.    COMPARISON: None.      Impression    IMPRESSION: Two views of the chest are performed. Small right and  trace left pleural effusions are present with  right lung base  atelectasis or infiltrate is noted. A few patchy airspace opacities  are also noted at the left lung base. Pneumonia is possible. Heart is  normal in size. No pneumothorax.    SILVINO LANDAVERDE MD   XR Chest Port 1 View    Narrative    XR CHEST PORT 1 VW  4/12/2019 2:41 AM     HISTORY:  Shortness of breath    COMPARISON: Film dated 4/11/2019    FINDINGS:  The heart is enlarged. Moderate size right pleural effusion  is again noted. There is associated right basilar infiltrate or  atelectasis. Left lung remains clear.      Impression    IMPRESSION: Stable. No significant change in the right pleural  effusion and associated infiltrate or atelectasis.    SHANELLE RITCHIE MD

## 2019-04-14 NOTE — CONSULTS
Care Transition Initial Assessment - SW     Met with: Patient    Principal Problem:    Atrial fibrillation -- new Onset  Active Problems:    Elevation of cardiac enzymes    Diabetes mellitus, type 2 (H)    HTN (hypertension)    Urinary tract infection    New onset atrial fibrillation (H)    Aortic stenosis, Mild       DATA  Lives With: alone   Living Arrangements: apartment  Quality of Family Relationships: involved, supportive  Description of Support System: Supportive, Involved  Who is your support system?: Children  Support Assessment: Adequate family and caregiver support  Identified issues/concerns regarding health management:     Resources List: Skilled Nursing Facility     Quality of Family Relationships: involved, supportive  Transportation Anticipated: family or friend will provide    Per social work consult for discharge planning.  Patient was admitted on 4-11-19 with a fib.  The tentative date of discharge is 4-14-19.  Reviewed chart and spoke with patient and daughter's Silvio regarding discharge plans.  Per patient and daughter's report, patient lives alone in an apartment.  Patient uses a rolling walker at baseline.  Patient has grab bars and a shower chair in the bathroom.  Patient is independent with medication management.  Patient is independent with simple meal prep.  Patient gets Meals on Wheels 5 days a week.  Patient's daughter's assist as needed.  Reviewed the therapy discharge recommendations of tcu placement on discharge and patient and daughter's are in agreement.  Patient's daughter's are asking for a referral to be sent to [facility].  They would prefer a private room.  Explained there is a $45 per day amenity fee and patient's daughter's are in agreement.  Referral sent to [facility] to check bed availability.  Received a call back from [facility].  They can accept patient today.  Patient's dasughter's will transport around 14:00 today.  Patient and family informed of the  plan and in agreement to the plan.  Call placed to [facility] to get pre-auth and spoke with Pina.  Received pre-auth number [id number].  Faxed the document to [facility].  Call placed to update [facility] and faxed the orders and the PAS.    ASSESSMENT  Cognitive Status:  awake and alert  Concerns to be addressed: discharge planning and tcu placement on discharge.     PLAN  Financial costs for the patient includes private room amenity fees.  Patient given options and choices for discharge tcu choices.  Patient/family is agreeable to the plan?  Yes  Patient Goals and Preferences: TCU on discharge.  Patient anticipates discharging to:  [facility].    No further needs anticipated.    PAS-RR    D: Per DHS regulation, KATERIN completed and submitted PAS-RR to [place] [employer] Direct [employer] via the [employer].  PAS-RR confirmation # is : [id number].    I: KATERIN spoke with patient and daughter's and they are aware a PAS-RR has been submitted.  KATERIN reviewed with patient and daughter's that they may be contacted for a follow up appointment within 10 days of hospital discharge if their SNF stay is < 30 days.  Contact information for [facility] was also provided.    A: Patient and daughter's verbalized understanding.    P: Further questions may be directed to [facility] at #[phone], option #4 for PAS-RR staff.      .

## 2019-04-14 NOTE — PLAN OF CARE
A&Ox4. VSS on RA, weaned O2. Tele Afib CVR. LS clear, trace BLE edema. Frequent voiding - PVRs 230, 260. A1/W. Blood sugars monitored. Mod carb/low sodium diet. Plan for possible discharge to TCU today.

## 2019-04-14 NOTE — PLAN OF CARE
Physical Therapy Discharge Summary    Reason for therapy discharge:    Discharged to transitional care facility.    Progress towards therapy goal(s). See goals on Care Plan in [facility] electronic health record for goal details.  Goals not met.  Barriers to achieving goals:   discharge from facility.    Therapy recommendation(s):    Continued therapy is recommended.  Rationale/Recommendations:  Pt will benefit from TCU to maximize his safety with his level of mobility prior to return home alone..

## 2019-04-14 NOTE — PLAN OF CARE
Discharge Planner OT   Patient plan for discharge: TCU  Current status: Time included monitoring of VS and symptoms. Sup>sit to L side of bed w/ max verbal and physical cueing, required min A to regain balance as pt too close to EOB.  Pt required CGA and max cueing for safety w/ functional transfers to/from EOB.Pt ambulated to/from restroom w/ CGA using 2WW, VC's for obstacle negotiation.     Pt ambulated x 2 min 45 sec w/ CGA using 2WW, no overt LOB, CV response stable, see VS flowsheet.    Pt completed toileting w/ CGA to pull down undergarments, max A for posterior pericare, and min A to pull up undergarments. VC's for safe walker placement in stance at sink; pt washed his hands in stance w/ CGA for balance, 1 minor LOB but pt able to regain balance.   Barriers to return to prior living situation: Lives alone, current level of A, decreased balance and safety  Recommendations for discharge: TCU  Rationale for recommendations: Pt will benefit from continued therapy intervention in order to improve safety and indep w/ ADL's and mobility prior to returning home to [facility].       Entered by: Daniella Lora 04/14/2019 12:29 PM     Occupational Therapy Discharge Summary    Reason for therapy discharge:    Discharged to transitional care facility.    Progress towards therapy goal(s). See goals on Care Plan in [facility] electronic health record for goal details.  Goals not met.  Barriers to achieving goals:   discharge from facility.    Therapy recommendation(s):    Continued therapy is recommended.  Rationale/Recommendations:  at TCU.

## 2019-04-15 NOTE — TELEPHONE ENCOUNTER
Patient was evaluated by cardiology while inpatient for generalized weakness and new onset of A. Fib. Started on Eliquis and Metoprolol..RN called [facility] TCU to confirm the follow up plan for patient with Care Coordinator. RN confirmed with Care Coordinator that patient has a scheduled F/U appt on 5/2/19 with Dr. Scanlon. Reminded to send last progress note, MAR, active orders, and provider order sheet to appt. Verified pt is wearing event monitor as ordered. RADHA Jennings RN.

## 2019-04-15 NOTE — NURSING NOTE
Last 3 BPs: 123/67, 120/63, 112/66 mmHg  HR Ranges: 68 bpm  Admission Weight: 4/11/19: 188 lbs  BG  PM: 181-214 mg/dL

## 2019-04-15 NOTE — PROGRESS NOTES
[place] [facility]  PRIMARY CARE PROVIDER AND CLINIC:  Physician No Ref-Primary, No address on file  Chief Complaint   Patient presents with     Hospital F/U     [place] Medical Record Number:  [id number]  Place of Service where encounter took place:  [facility] ([facility]) [[id number]]    Renato Mccartney  is a 86 year old  (11/19/1932), DM type 2, HTN, hx of CVA with slight residual left hemiparesis who presents with generalized weakness for 1 day and found to have new onset atrial fibrillation   admitted to the above facility from  [facility]. Hospital stay 4/11/19 through 4/14/19..  Admitted to this facility for  rehab, medical management and nursing care.    HPI:    HPI information obtained from: facility chart records, facility staff, patient report, [facility] chart review and Care Everywhere [facility] chart review.   Brief Summary of Hospital Course:   Atrial fib: new diagnosis, CHADS vasc score of 6, started on metoprolol and eliquis , ziopatch, f/u with cardiology  Acute diastolic CHF: right pleural effusion, IV to oral lasix, weaned off O2, f/u with cardiology in 2-3 weeks  UTI: e-coli, rocephin to Keflex: add flomax due to urinary frequency  DMII: Hgb A1C 8.3 uncontrolled, held glucophage during hospitalization, plan to DC with higher dose of metformin  Updates on Status Since Skilled nursing Admission: On exam today patient is alert, pleasant, denies SOB, palpitations, CP, c/o urinary frequency, states every hour during the night he has to urinate, denies fever, chills, cough, congestion, N/V/D or constipation.   Last 3 BPs: 123/67, 120/63, 112/66 mmHg  HR Ranges: 68 bpm  Admission Weight: 4/11/19: 188 lbs  BG  PM: 181-214 mg/dL    CODE STATUS/ADVANCE DIRECTIVES DISCUSSION:   DNR / DNI  Patient's living condition: lives alone  ALLERGIES: Patient has no known allergies.  PAST MEDICAL HISTORY:  has a past medical history of Cerebral infarction (H) (2004), Detached retina, left,  DM2 (diabetes mellitus, type 2) (H) (2000), Hypertension, and Lymphoma (2008).  PAST SURGICAL HISTORY:   has a past surgical history that includes appendectomy (1949); back surgery (1954); and Eye surgery.  FAMILY HISTORY: family history includes Abdominal Aortic Aneurysm in his mother; Myocardial Infarction in his father.  SOCIAL HISTORY:   reports that he quit smoking about 39 years ago. He has never used smokeless tobacco. He reports that he drinks alcohol. He reports that he does not use drugs.    Post Discharge Medication Reconciliation Status: discharge medications reconciled, continue medications without change    Current Outpatient Medications   Medication Sig Dispense Refill     apixaban ANTICOAGULANT (ELIQUIS) 5 MG tablet Take 1 tablet (5 mg) by mouth 2 times daily       atorvastatin (LIPITOR) 20 MG tablet Take 20 mg by mouth At Bedtime        cephALEXin (KEFLEX) 500 MG capsule Take 1 capsule (500 mg) by mouth 2 times daily for 3 days       cholecalciferol 1000 units TABS Take 2,000 Units by mouth daily        fish oil-omega-3 fatty acids 1000 MG capsule Take 1 g by mouth daily       furosemide (LASIX) 40 MG tablet Take 1 tablet (40 mg) by mouth 2 times daily       insulin aspart (NOVOLOG VIAL) 100 UNITS/ML vial Give before meals and before bed:  For Pre-Meal Glucose:  140-189 give 1 unit   190-239 give 2 units   240-289 give 3 units   290-339 give 4 units   = or >340 give 5 units     For Bedtime Glucose  200 - 239 give 1 units   240 - 289 give 1.5 units  290 - 339 give 2 units  = or >340 give 2.5 units 10 mL 0     lisinopril (PRINIVIL/ZESTRIL) 20 MG tablet Take 20 mg by mouth daily       metFORMIN (GLUCOPHAGE) 500 MG tablet Take 2 tablets (1,000 mg) by mouth 2 times daily (with meals)       metoprolol succinate ER (TOPROL-XL) 25 MG 24 hr tablet Take 0.5 tablets (12.5 mg) by mouth daily       tamsulosin (FLOMAX) 0.4 MG capsule Take 1 capsule (0.4 mg) by mouth daily         ROS:  10 point ROS of systems  including Constitutional, Eyes, Respiratory, Cardiovascular, Gastroenterology, Genitourinary, Integumentary, Musculoskeletal, Psychiatric were all negative except for pertinent positives noted in my HPI.    Vitals:  /67   Pulse 68   Temp 97.7  F (36.5  C)   Resp 20   Wt 85.3 kg (188 lb)   SpO2 98%   BMI 26.98 kg/m    Exam:  GENERAL APPEARANCE:  Alert, in no distress  ENT:  Mouth and posterior oropharynx normal, moist mucous membranes, [place]  EYES:  EOM, conjunctivae, lids, pupils and irises normal, PERRL  RESP:  respiratory effort and palpation of chest normal, lungs clear to auscultation , no respiratory distress  CV:  Palpation and auscultation of heart done , rate irregular, murmur noted 4/6, no murmur, no edema  ABDOMEN:  normal bowel sounds, soft, nontender, no hepatosplenomegaly or other masses  M/S:   Examination of:   right upper extremity, left upper extremity, right lower extremity and left lower extremity  Inspection, ROM, stability and muscle strength normal  SKIN:  Inspection of skin and subcutaneous tissue baseline  NEURO:   Cranial nerves 2-12 are normal tested and grossly at patient's baseline, speech WNL  PSYCH:  affect and mood normal    Lab/Diagnostic data:  Recent labs in [facility] reviewed by me today.     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 Greater than 50% of total time spent with counseling and coordinating care due to update orders  Electronically signed by:  Tonya Lynn Haase, APRN CNP

## 2019-04-15 NOTE — LETTER
4/15/2019        RE: Renato Mccartney  [address]  [facility] [address]        [place] [facility]  PRIMARY CARE PROVIDER AND CLINIC:  Physician No Ref-Primary, No address on file  Chief Complaint   Patient presents with     Hospital F/U     [place] Medical Record Number:  [id number]  Place of Service where encounter took place:  [facility] ([facility]) [[id number]]    Renato Mccartney  is a 86 year old  (11/19/1932), DM type 2, HTN, hx of CVA with slight residual left hemiparesis who presents with generalized weakness for 1 day and found to have new onset atrial fibrillation   admitted to the above facility from  [facility]. Hospital stay 4/11/19 through 4/14/19..  Admitted to this facility for  rehab, medical management and nursing care.    HPI:    HPI information obtained from: facility chart records, facility staff, patient report, [facility] chart review and Care Everywhere [facility] chart review.   Brief Summary of Hospital Course:   Atrial fib: new diagnosis, CHADS vasc score of 6, started on metoprolol and eliquis , ziopatch, f/u with cardiology  Acute diastolic CHF: right pleural effusion, IV to oral lasix, weaned off O2, f/u with cardiology in 2-3 weeks  UTI: e-coli, rocephin to Keflex: add flomax due to urinary frequency  DMII: Hgb A1C 8.3 uncontrolled, held glucophage during hospitalization, plan to DC with higher dose of metformin  Updates on Status Since Skilled nursing Admission: On exam today patient is alert, pleasant, denies SOB, palpitations, CP, c/o urinary frequency, states every hour during the night he has to urinate, denies fever, chills, cough, congestion, N/V/D or constipation.   Last 3 BPs: 123/67, 120/63, 112/66 mmHg  HR Ranges: 68 bpm  Admission Weight: 4/11/19: 188 lbs  BG  PM: 181-214 mg/dL    CODE STATUS/ADVANCE DIRECTIVES DISCUSSION:   DNR / DNI  Patient's living condition: lives alone  ALLERGIES: Patient has no known allergies.  PAST MEDICAL  HISTORY:  has a past medical history of Cerebral infarction (H) (2004), Detached retina, left, DM2 (diabetes mellitus, type 2) (H) (2000), Hypertension, and Lymphoma (2008).  PAST SURGICAL HISTORY:   has a past surgical history that includes appendectomy (1949); back surgery (1954); and Eye surgery.  FAMILY HISTORY: family history includes Abdominal Aortic Aneurysm in his mother; Myocardial Infarction in his father.  SOCIAL HISTORY:   reports that he quit smoking about 39 years ago. He has never used smokeless tobacco. He reports that he drinks alcohol. He reports that he does not use drugs.    Post Discharge Medication Reconciliation Status: discharge medications reconciled, continue medications without change    Current Outpatient Medications   Medication Sig Dispense Refill     apixaban ANTICOAGULANT (ELIQUIS) 5 MG tablet Take 1 tablet (5 mg) by mouth 2 times daily       atorvastatin (LIPITOR) 20 MG tablet Take 20 mg by mouth At Bedtime        cephALEXin (KEFLEX) 500 MG capsule Take 1 capsule (500 mg) by mouth 2 times daily for 3 days       cholecalciferol 1000 units TABS Take 2,000 Units by mouth daily        fish oil-omega-3 fatty acids 1000 MG capsule Take 1 g by mouth daily       furosemide (LASIX) 40 MG tablet Take 1 tablet (40 mg) by mouth 2 times daily       insulin aspart (NOVOLOG VIAL) 100 UNITS/ML vial Give before meals and before bed:  For Pre-Meal Glucose:  140-189 give 1 unit   190-239 give 2 units   240-289 give 3 units   290-339 give 4 units   = or >340 give 5 units     For Bedtime Glucose  200 - 239 give 1 units   240 - 289 give 1.5 units  290 - 339 give 2 units  = or >340 give 2.5 units 10 mL 0     lisinopril (PRINIVIL/ZESTRIL) 20 MG tablet Take 20 mg by mouth daily       metFORMIN (GLUCOPHAGE) 500 MG tablet Take 2 tablets (1,000 mg) by mouth 2 times daily (with meals)       metoprolol succinate ER (TOPROL-XL) 25 MG 24 hr tablet Take 0.5 tablets (12.5 mg) by mouth daily       tamsulosin (FLOMAX)  0.4 MG capsule Take 1 capsule (0.4 mg) by mouth daily         ROS:  10 point ROS of systems including Constitutional, Eyes, Respiratory, Cardiovascular, Gastroenterology, Genitourinary, Integumentary, Musculoskeletal, Psychiatric were all negative except for pertinent positives noted in my HPI.    Vitals:  /67   Pulse 68   Temp 97.7  F (36.5  C)   Resp 20   Wt 85.3 kg (188 lb)   SpO2 98%   BMI 26.98 kg/m     Exam:  GENERAL APPEARANCE:  Alert, in no distress  ENT:  Mouth and posterior oropharynx normal, moist mucous membranes, [place]  EYES:  EOM, conjunctivae, lids, pupils and irises normal, PERRL  RESP:  respiratory effort and palpation of chest normal, lungs clear to auscultation , no respiratory distress  CV:  Palpation and auscultation of heart done , rate irregular, murmur noted 4/6, no murmur, no edema  ABDOMEN:  normal bowel sounds, soft, nontender, no hepatosplenomegaly or other masses  M/S:   Examination of:   right upper extremity, left upper extremity, right lower extremity and left lower extremity  Inspection, ROM, stability and muscle strength normal  SKIN:  Inspection of skin and subcutaneous tissue baseline  NEURO:   Cranial nerves 2-12 are normal tested and grossly at patient's baseline, speech WNL  PSYCH:  affect and mood normal    Lab/Diagnostic data:  Recent labs in [facility] reviewed by me today.     ASSESSMENT/PLAN:  Current [place] scheduled appointments:     Persistent atrial fibrillation (H) NEW ONSET  Acute diastolic congestive heart failure (H)  Essential hypertension  Physical deconditioning  Acute/ongoing: mild Aortic stenosis, daily weights, BMP on wednesday, vitals daily and prn,   Continue toprol xl 12.5mg QD, eliquis 5mg BID, lisinopril 20mg QD, lasix 40mg BID  PT and OT for strengthening    Urinary tract infection without hematuria, site unspecified  With ongoing urinary frequency  Acute/ongoing: continue keflex 500mg BID  Started on flomax 0.4mg QD     Uncontrolled diabetes  mellitus with circulatory complication (H)  Acute/ongoing, uncontrolled with Hgb A1C of 8.3  Blood sugar monitoring BID, call if < 60 or > 350, glucophage increased to 1000mg BID, follow labs       Orders written by provider at facility  BMP and Hgb on wednesday  DC sliding scale insulin  Blood sugar check BID call if BS < 60 or > 350      Total time spent with patient visit at the [facility] was 45 min including patient visit and review of past records. Greater than 50% of total time spent with counseling and coordinating care due to update orders  Electronically signed by:  Tonya Lynn Haase, APRN CNP                       Sincerely,        Tonya Lynn Haase, APRN CNP

## 2019-04-16 NOTE — PROGRESS NOTES
[facility] evaluation referral received during hospital stay. He was in [facility] 4/11 - 4/14    Patient is not currently established in the [facility] Clinic. He was admitted 4/11 w/complaints of fatigue and generalized weakness. Admission CXR showed small R, trace L pleural effusion. ECG om arrival with AF, controlled ventricular rate. Last ECG from 2016 in [facility]. Patient also with +UTI. He was seen by cardiology and per TTE enlarged LA, diastolic dysfunction, normal EF. Patient transitioned to Eliquis(ASA, plavix DC'd), started on metoprolol and po lasix. He was discharged to [facility].     Future Appointments   Date Time Provider Department Center   4/18/2019  9:50 AM [facility] OTILIA [facility]   4/18/2019 10:50 AM Debra Gant APRN CNP [facility]   5/2/2019  1:15 PM Deep Scanlon MD [facility]     CORE appt scheduled as above. Call to [facility] with date/times. Call to patients daughter, but no answer and unable to LM. Staff at TCU will update family about CORE appt and will have them call with any questions/concerns.     We look forward to seeing him in the clinic.   Please call with questions.     Lida Okeefe, RN  [facility] RN Care Coordinator  [facility]  [phone]    [facility]: Cardiomyopathy, Optimization, Rehabilitation, Education   The [facility] is a heart failure specialty clinic within the [facility] where you will work with your cardiologist, nurse practitioners, physician assistants and registered nurses who specialize in heart failure care. They are dedicated to helping patients with heart failure to carefully adjust medications, receive education, and learn who and when to call if symptoms develop. They specialize in helping you better understand your condition, slow the progression of your disease, improve the length and quality of your life, help you detect future heart problems before they become life  threatening, and avoid hospitalizations.

## 2019-04-18 NOTE — TELEPHONE ENCOUNTER
----- Message from CHANEL Mohan CNP sent at 4/18/2019  2:11 PM CDT -----  Regarding: Call monday, labs at facility tuesday  HI,    I saw Renato for CORE enrollment today. Bump in creatinine noted, decrease furosemide today to 40mg daily.     1. Call facility Monday to check on weight/symptoms  2. Fax order for BMP to be drawn at facility on Tuesday 4/23/19    Thank you for all your help!!  Debra   ________________________________________    Called to [facility] [phone] with order for lab/BMP to be drawn on 4/23/2019 and faxed order for labs to [phone] and messaged CORE board to call Monday for update and Tuesday for lab results.  Sue Cote RN 04/18/19 2:29 PM

## 2019-04-18 NOTE — LETTER
4/18/2019    Diane Falcon NP  [facility] [address]  [facility] [address]    RE: Renato Mccartney       Dear Colleague,    I had the pleasure of seeing Renato Mccartney in the [facility].    Cardiology Clinic Progress Note  Renato Mccartney MRN# [id number]   YOB: 1932 Age: 86 year old   Primary Cardiologist: Dr. Scanlon Reason for visit: CORE enrollment            Assessment and Plan:   Renato Mccartney is a very pleasant 86 year old male with a history of HFpEF, atrial fibrillation (Dx 4/2019), mild aortic stenosis, mild aortic root dilation, DM type 2, HTN and CVA with residual left hemiparesis. Patient hospitalized 4/11/19-4/14/19 presenting with generalized weakness, he was found to have new onset atrial fibrillation and UTI. Patient was also noted to be in an acute diastolic heart failure exacerbation. BNP 10,436. Admission weight 215#. Discharge weight 188#. Patient here today for post hospital followup and CORE enrollment.     Labs today show bump in creatinine 1.03 -> 1.41, ? If secondary to overdiuresis.       1.  Chronic diastolic heart failure/HFpEF - echo showed 4/12/19 showed LVEF 55-60%, patient appears compensated and euvolemic, noting significant improvement in symptoms, concerns for overdiuresis with bump in creatinine today. Weight has been stable since discharge, noting weight at facility has been 188# for past 3 days (discharge weight 188#).    - NYHA class III, stage C   - Fluid status : euvolemic, concerns for over diuresis due to bump in creatinine   - Diuretic regimen : DECREASE furosemide to 40mg daily   - Aldosterone antagonist : will consider in the future if any difficulty with symptoms (but normal RV function/size).    - Blood pressure : controlled   - HF education given, provided patient and family with education materials, reviewed when to notify CORE clinic.   - CORE RN to call facility on Monday to check on patients  weight/symptoms.    - Repeat BMP on Tuesday at TCU.     2. Atrial fibrillation - newly diagnosed in April 2019 during hospitalization, stable, rate controlled, asymptomatic, Patient was seen by electrophysiologist Dr. Bentley while in the hospital who recommended rate control strategy, started metoprolol XL 12.5mg daily, 3 day zio monitor and to follow up with general cardiologist outpatient.     - CHADS-VASc score 6   - Zio montior results pending    - Continue metoprolol XL 12.5mg daily   - Continue apixaban 5mg BID for thromboembolic prophylaxis     3. Mild aortic stenosis - will continue to monitor with routine echocardiogram, will check in next 6-12 months to assess for progression.     4. Mild aortic root dilation - will continue to monitor with routine echocardiogram, will check in next 6-12 months to assess for progression.      5. Hypertension - controlled, continue current medication regimen.     6. DM type II - uncontrolled, HgbA1c 8.3, counseled patient on the benefits of good glycemic control.     7. Health promotion - extensively reviewed medications today, including rationale, indication, dosing and potential SE. Provided all 3 daughters with a copy of the medication list, written in indication/rationale next to each medication.     8. Advance care planning - DNR/DNI, need to continue to further explore patients goals of care.     9. MORGAN - bump in creatinine 1.03->1.41, ? If secondary to overdiuresis. Patient has been on lisinopril 20mg with no recent changes.    - As noted above will decrease furosemide to 40mg daily with close monitoring.         Changes today: DECREASE furosemide to 40mg daily    Follow up plan:     Labs next week at [place] to check kidney function    Follow up with Dr. Scanlon as scheduled on 5/2/19 - add labs prior to clinic visit    Follow up with Debra in CORE clinic in 6 weeks with labs.     CORE RN to call facility on Monday to check on patients weight/symptoms.          History of Presenting Illness:    Renato Mccartney is a very pleasant 86 year old male with a history of HFpEF, atrial fibrillation (Dx 4/2019), mild aortic stenosis, mild aortic root dilation, DM type 2, HTN and CVA with residual left hemiparesis.     Patient was hospitalized 4/11/19-4/14/19 presenting with generalized weakness, he was found to have new onset atrial fibrillation and UTI. Patient was also noted to be in an acute diastolic heart failure exacerbation. BNP 10,436. CXR small right pleural effusion. Troponin elevation, peaked at 0.145, felt to be secondary to atrial fibrillation. TSH normal. Echocardiogram completed 4/12/19 showed LVEF 55-60%, no regional wall motion abnormalities, mild aortic stenosis, and mild aortic root dilation, . Patient was seen by electrophysiologist Dr. Bentley who recommended rate control, started metoprolol XL 12.5mg daily, 3 day zio monitor and to follow up with general cardiologist outpatient. CHADS vasc score 6, aspirin/plavix stopped and eliquis started. Patient was diuresed with IV furosemide and discharged on furosemide 40mg BID. Admission weight 215#. Discharge weight 188#.     He was discharged to TCU.     Patient is here today for CORE enrollment/post hospital followup. His 3 daughters are with for clinic visit today.     Patient reports feeling good. Monitoring weights daily at facility, he states weight has been 188# the past 3 days. Denies lower extremity edema, which he states is an improvement. Denies abdominal distention/bloating. Denies shortness of breath at rest. Denies exertional dyspnea. Daughter notes patient was having dyspnea prior to hospitalization, patient denies this. Daughter feels breathing has improved. Sleeping okay, getting up to urinate disrupting his sleep. Denies orthopnea or PND. Energy levels are good, improving. Patient denies chest pain or chest tightness. Denies dizziness, lightheadedness or other presyncopal symptoms. Denies tachycardia or  palpitations. Denies episodes of bleeding.     Labs today show bump in creatinine 1.03 -> 1.41 today, electrolytes stable. Blood pressure 120/72 and HR 66 in clinic today.    Appetite good. Trying to limit sodium. Not adding additional salt to foods. Prior to TCU stay was getting meals on wheels, 8 meals per month in building and daughters bringing meals. Currently drinking 2-3 glasses of water. Working with PT/OT. At home was doing a light weight lifting routine and a routine at on the bed for leg exercises. Seldom alcohol use. Denies tobacco use.         Recent Hospitalizations   19-19 presenting with generalized weakness, he was found to have new onset atrial fibrillation and UTI. Patient was also noted to be in an acute diastolic heart failure exacerbation. Discharged on furosemide 40mg BID.         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   Social Needs     Financial resource strain: Not on file     Food insecurity:     Worry: Not on file     Inability: Not on file     Transportation needs:     Medical: Not on file     Non-medical: Not on file   Tobacco Use     Smoking status: Former Smoker     Last attempt to quit: 1980     Years since quittin.3     Smokeless tobacco: Never Used     Tobacco comment: 1 pot of coffee per day, diet cola daily    Substance and Sexual Activity     Alcohol use: Yes     Comment: 1 every couple of weeks      Drug use: Never     Sexual activity: Not on file   Lifestyle     Physical activity:     Days per week: Not on file     Minutes per session: Not on file     Stress: Not on file   Relationships     Social connections:     Talks on phone:  "Not on file     Gets together: Not on file     Attends Judaism service: Not on file     Active member of club or organization: Not on file     Attends meetings of clubs or organizations: Not on file     Relationship status: Not on file     Intimate partner violence:     Fear of current or ex partner: Not on file     Emotionally abused: Not on file     Physically abused: Not on file     Forced sexual activity: Not on file   Other Topics Concern     Parent/sibling w/ CABG, MI or angioplasty before 65F 55M? Not Asked   Social History Narrative    , has 3 children, lives by himself in an apartment.  Has living will, desires DNR/DNI.  (last updated 4/11/2019)             Review of Systems:   Skin:  Negative     Eyes:  Negative    ENT:  Negative    Respiratory:  Negative    Cardiovascular:  Negative edema;Positive for  Gastroenterology: Negative    Genitourinary:  Negative    Musculoskeletal:  Negative    Neurologic:  Negative    Psychiatric:  Negative    Heme/Lymph/Imm:  Negative    Endocrine:  Positive for diabetes         Physical Exam:   Vitals: /72   Pulse 66   Ht 1.778 m (5' 10\")   Wt 86.6 kg (191 lb)   SpO2 97%   BMI 27.41 kg/m      Wt Readings from Last 4 Encounters:   04/18/19 86.6 kg (191 lb)   04/14/19 85.3 kg (188 lb)   04/14/19 85.5 kg (188 lb 9.6 oz)     GEN: well nourished, in no acute distress.  HEENT:  Pupils equal, round. Sclerae nonicteric.   NECK: Supple, no masses appreciated. No JVD appreciated on exam  C/V:  Irregularly irregular rate and rhythm, 2/6 systolic murmur  RESP: Respirations are unlabored. Clear to auscultation bilaterally without wheezing, rales, or rhonchi.  GI: Abdomen soft, nontender.  EXTREM: No LE in right lower extremity, Trace LE edema in left lower extremity   NEURO: Alert and oriented, cooperative.  SKIN: Warm and dry.       Data:   ECHO 4/12/18  The left ventricle is normal in size.  The visual ejection fraction is estimated at 55-60%.  Diastolic Doppler " findings (E/E' ratio and/or other parameters) suggest left  ventricular filling pressures are increased.  No regional wall motion abnormalities noted.  There is sclerosis, calcification, and restriction of the aortic valve opening  compatible with mild aortic stenosis.  Mild aortic root dilatation.  The rhythm was atrial fibrillation.  The patient exhibited occasional PVCs.    CBC RESULTS:  Lab Results   Component Value Date    WBC 11.2 (H) 04/11/2019    RBC 4.21 (L) 04/11/2019    HGB 12.8 (L) 04/11/2019    HCT 38.4 (L) 04/11/2019    MCV 91 04/11/2019    MCH 30.4 04/11/2019    MCHC 33.3 04/11/2019    RDW 15.5 (H) 04/11/2019     04/14/2019     BMP RESULTS:  Lab Results   Component Value Date     04/18/2019    POTASSIUM 4.0 04/18/2019    CHLORIDE 98 04/18/2019    CO2 33 (H) 04/18/2019    ANIONGAP 10 04/18/2019     (H) 04/18/2019    BUN 28 04/18/2019    CR 1.41 (H) 04/18/2019    GFRESTIMATED 48 (L) 04/18/2019    GFRESTBLACK 58 (L) 04/18/2019    JESU 9.4 04/18/2019      A1C RESULTS:  Lab Results   Component Value Date    A1C 8.3 (H) 04/11/2019            Medications     Current Outpatient Medications   Medication Sig Dispense Refill     apixaban ANTICOAGULANT (ELIQUIS) 5 MG tablet Take 1 tablet (5 mg) by mouth 2 times daily       atorvastatin (LIPITOR) 20 MG tablet Take 20 mg by mouth At Bedtime        cholecalciferol 1000 units TABS Take 2,000 Units by mouth daily        fish oil-omega-3 fatty acids 1000 MG capsule Take 1 g by mouth daily       furosemide (LASIX) 40 MG tablet Take 1 tablet (40 mg) by mouth daily 90 tablet 1     insulin aspart (NOVOLOG VIAL) 100 UNITS/ML vial Give before meals and before bed:  For Pre-Meal Glucose:  140-189 give 1 unit   190-239 give 2 units   240-289 give 3 units   290-339 give 4 units   = or >340 give 5 units     For Bedtime Glucose  200 - 239 give 1 units   240 - 289 give 1.5 units  290 - 339 give 2 units  = or >340 give 2.5 units 10 mL 0     lisinopril  (PRINIVIL/ZESTRIL) 20 MG tablet Take 20 mg by mouth daily       metFORMIN (GLUCOPHAGE) 500 MG tablet Take 2 tablets (1,000 mg) by mouth 2 times daily (with meals)       metoprolol succinate ER (TOPROL-XL) 25 MG 24 hr tablet Take 0.5 tablets (12.5 mg) by mouth daily       tamsulosin (FLOMAX) 0.4 MG capsule Take 1 capsule (0.4 mg) by mouth daily            Past Medical History     Past Medical History:   Diagnosis Date     Cerebral infarction (H)     Residual left hemiparesis, on Plavix     Detached retina, left      DM2 (diabetes mellitus, type 2) (H)      Hypertension      Lymphoma     treated with Chemo/Rad in , recurrence 2017 tx'd with chemo     Past Surgical History:   Procedure Laterality Date     APPENDECTOMY  1949     BACK SURGERY       EYE SURGERY      cataracts     Family History   Problem Relation Age of Onset     Abdominal Aortic Aneurysm Mother          at age 99     Myocardial Infarction Father          age 65            Allergies   Patient has no known allergies.        CHANEL Canas CNP  [facility]  Pager: [phone]      Thank you for allowing me to participate in the care of your patient.    Sincerely,     CHANEL Canas CNP     [facility]

## 2019-04-18 NOTE — LETTER
4/18/2019    Diane Falcon NP  [employer] [address]  [facility] [address]    RE: Renato Mccartney       Dear Colleague,    I had the pleasure of seeing Renato Mccartney in the [facility].    Cardiology Clinic Progress Note  Renato Mccartney MRN# [id number]   YOB: 1932 Age: 86 year old   Primary Cardiologist: Dr. Scanlon Reason for visit: CORE enrollment            Assessment and Plan:   Renato Mccartney is a very pleasant 86 year old male with a history of HFpEF, atrial fibrillation (Dx 4/2019), mild aortic stenosis, mild aortic root dilation, DM type 2, HTN and CVA with residual left hemiparesis. Patient hospitalized 4/11/19-4/14/19 presenting with generalized weakness, he was found to have new onset atrial fibrillation and UTI. Patient was also noted to be in an acute diastolic heart failure exacerbation. BNP 10,436. Admission weight 215#. Discharge weight 188#. Patient here today for post hospital followup and CORE enrollment.     Labs today show bump in creatinine 1.03 -> 1.41, ? If secondary to overdiuresis.       1.  Chronic diastolic heart failure/HFpEF - echo showed 4/12/19 showed LVEF 55-60%, patient appears compensated and euvolemic, noting significant improvement in symptoms, concerns for overdiuresis with bump in creatinine today. Weight has been stable since discharge, noting weight at facility has been 188# for past 3 days (discharge weight 188#).    - NYHA class III, stage C   - Fluid status : euvolemic, concerns for over diuresis due to bump in creatinine   - Diuretic regimen : DECREASE furosemide to 40mg daily   - Aldosterone antagonist : will consider in the future if any difficulty with symptoms (but normal RV function/size).    - Blood pressure : controlled   - HF education given, provided patient and family with education materials, reviewed when to notify CORE clinic.   - CORE RN to call facility on Monday to check on patients  weight/symptoms.    - Repeat BMP on Tuesday at TCU.     2. Atrial fibrillation - newly diagnosed in April 2019 during hospitalization, stable, rate controlled, asymptomatic, Patient was seen by electrophysiologist Dr. Bentley while in the hospital who recommended rate control strategy, started metoprolol XL 12.5mg daily, 3 day zio monitor and to follow up with general cardiologist outpatient.     - CHADS-VASc score 6   - Zio montior results pending    - Continue metoprolol XL 12.5mg daily   - Continue apixaban 5mg BID for thromboembolic prophylaxis     3. Mild aortic stenosis - will continue to monitor with routine echocardiogram, will check in next 6-12 months to assess for progression.     4. Mild aortic root dilation - will continue to monitor with routine echocardiogram, will check in next 6-12 months to assess for progression.      5. Hypertension - controlled, continue current medication regimen.     6. DM type II - uncontrolled, HgbA1c 8.3, counseled patient on the benefits of good glycemic control.     7. Health promotion - extensively reviewed medications today, including rationale, indication, dosing and potential SE. Provided all 3 daughters with a copy of the medication list, written in indication/rationale next to each medication.     8. Advance care planning - DNR/DNI, need to continue to further explore patients goals of care.     9. MORGAN - bump in creatinine 1.03->1.41, ? If secondary to overdiuresis. Patient has been on lisinopril 20mg with no recent changes.    - As noted above will decrease furosemide to 40mg daily with close monitoring.         Changes today: DECREASE furosemide to 40mg daily    Follow up plan:     Labs next week at [place] to check kidney function    Follow up with Dr. Scanlon as scheduled on 5/2/19 - add labs prior to clinic visit    Follow up with Debra in CORE clinic in 6 weeks with labs.     CORE RN to call facility on Monday to check on patients weight/symptoms.          History of Presenting Illness:    Renato Mccartney is a very pleasant 86 year old male with a history of HFpEF, atrial fibrillation (Dx 4/2019), mild aortic stenosis, mild aortic root dilation, DM type 2, HTN and CVA with residual left hemiparesis.     Patient was hospitalized 4/11/19-4/14/19 presenting with generalized weakness, he was found to have new onset atrial fibrillation and UTI. Patient was also noted to be in an acute diastolic heart failure exacerbation. BNP 10,436. CXR small right pleural effusion. Troponin elevation, peaked at 0.145, felt to be secondary to atrial fibrillation. TSH normal. Echocardiogram completed 4/12/19 showed LVEF 55-60%, no regional wall motion abnormalities, mild aortic stenosis, and mild aortic root dilation, . Patient was seen by electrophysiologist Dr. Bentley who recommended rate control, started metoprolol XL 12.5mg daily, 3 day zio monitor and to follow up with general cardiologist outpatient. CHADS vasc score 6, aspirin/plavix stopped and eliquis started. Patient was diuresed with IV furosemide and discharged on furosemide 40mg BID. Admission weight 215#. Discharge weight 188#.     He was discharged to TCU.     Patient is here today for CORE enrollment/post hospital followup. His 3 daughters are with for clinic visit today.     Patient reports feeling good. Monitoring weights daily at facility, he states weight has been 188# the past 3 days. Denies lower extremity edema, which he states is an improvement. Denies abdominal distention/bloating. Denies shortness of breath at rest. Denies exertional dyspnea. Daughter notes patient was having dyspnea prior to hospitalization, patient denies this. Daughter feels breathing has improved. Sleeping okay, getting up to urinate disrupting his sleep. Denies orthopnea or PND. Energy levels are good, improving. Patient denies chest pain or chest tightness. Denies dizziness, lightheadedness or other presyncopal symptoms. Denies tachycardia or  palpitations. Denies episodes of bleeding.     Labs today show bump in creatinine 1.03 -> 1.41 today, electrolytes stable. Blood pressure 120/72 and HR 66 in clinic today.    Appetite good. Trying to limit sodium. Not adding additional salt to foods. Prior to TCU stay was getting meals on wheels, 8 meals per month in building and daughters bringing meals. Currently drinking 2-3 glasses of water. Working with PT/OT. At home was doing a light weight lifting routine and a routine at on the bed for leg exercises. Seldom alcohol use. Denies tobacco use.         Recent Hospitalizations   19-19 presenting with generalized weakness, he was found to have new onset atrial fibrillation and UTI. Patient was also noted to be in an acute diastolic heart failure exacerbation. Discharged on furosemide 40mg BID.         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   Social Needs     Financial resource strain: Not on file     Food insecurity:     Worry: Not on file     Inability: Not on file     Transportation needs:     Medical: Not on file     Non-medical: Not on file   Tobacco Use     Smoking status: Former Smoker     Last attempt to quit: 1980     Years since quittin.3     Smokeless tobacco: Never Used     Tobacco comment: 1 pot of coffee per day, diet cola daily    Substance and Sexual Activity     Alcohol use: Yes     Comment: 1 every couple of weeks      Drug use: Never     Sexual activity: Not on file   Lifestyle     Physical activity:     Days per week: Not on file     Minutes per session: Not on file     Stress: Not on file   Relationships     Social connections:     Talks on phone:  "Not on file     Gets together: Not on file     Attends L[employer] service: Not on file     Active member of club or organization: Not on file     Attends meetings of clubs or organizations: Not on file     Relationship status: Not on file     Intimate partner violence:     Fear of current or ex partner: Not on file     Emotionally abused: Not on file     Physically abused: Not on file     Forced sexual activity: Not on file   Other Topics Concern     Parent/sibling w/ CABG, MI or angioplasty before 65F 55M? Not Asked   Social History Narrative    , has 3 children, lives by himself in an apartment.  Has living will, desires DNR/DNI.  (last updated 4/11/2019)             Review of Systems:   Skin:  Negative     Eyes:  Negative    ENT:  Negative    Respiratory:  Negative    Cardiovascular:  Negative edema;Positive for  Gastroenterology: Negative    Genitourinary:  Negative    Musculoskeletal:  Negative    Neurologic:  Negative    Psychiatric:  Negative    Heme/Lymph/Imm:  Negative    Endocrine:  Positive for diabetes         Physical Exam:   Vitals: /72   Pulse 66   Ht 1.778 m (5' 10\")   Wt 86.6 kg (191 lb)   SpO2 97%   BMI 27.41 kg/m      Wt Readings from Last 4 Encounters:   04/18/19 86.6 kg (191 lb)   04/14/19 85.3 kg (188 lb)   04/14/19 85.5 kg (188 lb 9.6 oz)     GEN: well nourished, in no acute distress.  HEENT:  Pupils equal, round. Sclerae nonicteric.   NECK: Supple, no masses appreciated. No JVD appreciated on exam  C/V:  Irregularly irregular rate and rhythm, 2/6 systolic murmur  RESP: Respirations are unlabored. Clear to auscultation bilaterally without wheezing, rales, or rhonchi.  GI: Abdomen soft, nontender.  EXTREM: No LE in right lower extremity, Trace LE edema in left lower extremity   NEURO: Alert and oriented, cooperative.  SKIN: Warm and dry.       Data:   ECHO 4/12/18  The left ventricle is normal in size.  The visual ejection fraction is estimated at 55-60%.  Diastolic Doppler " findings (E/E' ratio and/or other parameters) suggest left  ventricular filling pressures are increased.  No regional wall motion abnormalities noted.  There is sclerosis, calcification, and restriction of the aortic valve opening  compatible with mild aortic stenosis.  Mild aortic root dilatation.  The rhythm was atrial fibrillation.  The patient exhibited occasional PVCs.    CBC RESULTS:  Lab Results   Component Value Date    WBC 11.2 (H) 04/11/2019    RBC 4.21 (L) 04/11/2019    HGB 12.8 (L) 04/11/2019    HCT 38.4 (L) 04/11/2019    MCV 91 04/11/2019    MCH 30.4 04/11/2019    MCHC 33.3 04/11/2019    RDW 15.5 (H) 04/11/2019     04/14/2019     BMP RESULTS:  Lab Results   Component Value Date     04/18/2019    POTASSIUM 4.0 04/18/2019    CHLORIDE 98 04/18/2019    CO2 33 (H) 04/18/2019    ANIONGAP 10 04/18/2019     (H) 04/18/2019    BUN 28 04/18/2019    CR 1.41 (H) 04/18/2019    GFRESTIMATED 48 (L) 04/18/2019    GFRESTBLACK 58 (L) 04/18/2019    JESU 9.4 04/18/2019      A1C RESULTS:  Lab Results   Component Value Date    A1C 8.3 (H) 04/11/2019            Medications     Current Outpatient Medications   Medication Sig Dispense Refill     apixaban ANTICOAGULANT (ELIQUIS) 5 MG tablet Take 1 tablet (5 mg) by mouth 2 times daily       atorvastatin (LIPITOR) 20 MG tablet Take 20 mg by mouth At Bedtime        cholecalciferol 1000 units TABS Take 2,000 Units by mouth daily        fish oil-omega-3 fatty acids 1000 MG capsule Take 1 g by mouth daily       furosemide (LASIX) 40 MG tablet Take 1 tablet (40 mg) by mouth daily 90 tablet 1     insulin aspart (NOVOLOG VIAL) 100 UNITS/ML vial Give before meals and before bed:  For Pre-Meal Glucose:  140-189 give 1 unit   190-239 give 2 units   240-289 give 3 units   290-339 give 4 units   = or >340 give 5 units     For Bedtime Glucose  200 - 239 give 1 units   240 - 289 give 1.5 units  290 - 339 give 2 units  = or >340 give 2.5 units 10 mL 0     lisinopril  (PRINIVIL/ZESTRIL) 20 MG tablet Take 20 mg by mouth daily       metFORMIN (GLUCOPHAGE) 500 MG tablet Take 2 tablets (1,000 mg) by mouth 2 times daily (with meals)       metoprolol succinate ER (TOPROL-XL) 25 MG 24 hr tablet Take 0.5 tablets (12.5 mg) by mouth daily       tamsulosin (FLOMAX) 0.4 MG capsule Take 1 capsule (0.4 mg) by mouth daily            Past Medical History     Past Medical History:   Diagnosis Date     Cerebral infarction (H)     Residual left hemiparesis, on Plavix     Detached retina, left      DM2 (diabetes mellitus, type 2) (H)      Hypertension      Lymphoma     treated with Chemo/Rad in , recurrence 2017 tx'd with chemo     Past Surgical History:   Procedure Laterality Date     APPENDECTOMY       BACK SURGERY       EYE SURGERY      cataracts     Family History   Problem Relation Age of Onset     Abdominal Aortic Aneurysm Mother          at age 99     Myocardial Infarction Father          age 65            Allergies   Patient has no known allergies.        CHANEL Canas CNP  [facility]  Pager: [phone]      Thank you for allowing me to participate in the care of your patient.      Sincerely,     CHANEL Canas CNP     [facility]    cc:   No referring provider defined for this encounter.

## 2019-04-18 NOTE — PATIENT INSTRUCTIONS
Call CORE nurse for any questions or concerns Mon-Fri 8am-4pm:                            [phone]                     For concerns after hours:                               [phone]     Medication changes: DECREASE furosemide to 40mg daily   Plan from today: Labs next week at [place]. Follow up with Debra in 2 weeks.   Lab results: see attached; worsening kidney function noted today likely secondary to water pill, decreased dosage today.

## 2019-04-18 NOTE — PROGRESS NOTES
Cardiology Clinic Progress Note  Renato Mccartney MRN# [id number]   YOB: 1932 Age: 86 year old   Primary Cardiologist: Dr. Scanlon Reason for visit: CORE enrollment            Assessment and Plan:   Renato Mccartney is a very pleasant 86 year old male with a history of HFpEF, atrial fibrillation (Dx 4/2019), mild aortic stenosis, mild aortic root dilation, DM type 2, HTN and CVA with residual left hemiparesis. Patient hospitalized 4/11/19-4/14/19 presenting with generalized weakness, he was found to have new onset atrial fibrillation and UTI. Patient was also noted to be in an acute diastolic heart failure exacerbation. BNP 10,436. Admission weight 215#. Discharge weight 188#. Patient here today for post hospital followup and CORE enrollment.     Labs today show bump in creatinine 1.03 -> 1.41, ? If secondary to overdiuresis.       1.  Chronic diastolic heart failure/HFpEF - echo showed 4/12/19 showed LVEF 55-60%, patient appears compensated and euvolemic, noting significant improvement in symptoms, concerns for overdiuresis with bump in creatinine today. Weight has been stable since discharge, noting weight at facility has been 188# for past 3 days (discharge weight 188#).    - NYHA class III, stage C   - Fluid status : euvolemic, concerns for over diuresis due to bump in creatinine   - Diuretic regimen : DECREASE furosemide to 40mg daily   - Aldosterone antagonist : will consider in the future if any difficulty with symptoms (but normal RV function/size).    - Blood pressure : controlled   - HF education given, provided patient and family with education materials, reviewed when to notify CORE clinic.   - CORE RN to call facility on Monday to check on patients weight/symptoms.    - Repeat BMP on Tuesday at [facility].     2. Atrial fibrillation - newly diagnosed in April 2019 during hospitalization, stable, rate controlled, asymptomatic, Patient was seen by electrophysiologist Dr. Bentley while in the hospital  who recommended rate control strategy, started metoprolol XL 12.5mg daily, 3 day zio monitor and to follow up with general cardiologist outpatient.     - CHADS-VASc score 6   - Zio montior results pending    - Continue metoprolol XL 12.5mg daily   - Continue apixaban 5mg BID for thromboembolic prophylaxis     3. Mild aortic stenosis - will continue to monitor with routine echocardiogram, will check in next 6-12 months to assess for progression.     4. Mild aortic root dilation - will continue to monitor with routine echocardiogram, will check in next 6-12 months to assess for progression.      5. Hypertension - controlled, continue current medication regimen.     6. DM type II - uncontrolled, HgbA1c 8.3, counseled patient on the benefits of good glycemic control.     7. Health promotion - extensively reviewed medications today, including rationale, indication, dosing and potential SE. Provided all 3 daughters with a copy of the medication list, written in indication/rationale next to each medication.     8. Advance care planning - DNR/DNI, need to continue to further explore patients goals of care.     9. MORGAN - bump in creatinine 1.03->1.41, ? If secondary to overdiuresis. Patient has been on lisinopril 20mg with no recent changes.    - As noted above will decrease furosemide to 40mg daily with close monitoring.         Changes today: DECREASE furosemide to 40mg daily    Follow up plan:     Labs next week at [place] to check kidney function    Follow up with Dr. Scanlon as scheduled on 5/2/19 - add labs prior to clinic visit    Follow up with Debra in CORE clinic in 6 weeks with labs.     CORE RN to call facility on Monday to check on patients weight/symptoms.         History of Presenting Illness:    Renato Mccartney is a very pleasant 86 year old male with a history of HFpEF, atrial fibrillation (Dx 4/2019), mild aortic stenosis, mild aortic root dilation, DM type 2, HTN and CVA with residual left hemiparesis.      Patient was hospitalized 4/11/19-4/14/19 presenting with generalized weakness, he was found to have new onset atrial fibrillation and UTI. Patient was also noted to be in an acute diastolic heart failure exacerbation. BNP 10,436. CXR small right pleural effusion. Troponin elevation, peaked at 0.145, felt to be secondary to atrial fibrillation. TSH normal. Echocardiogram completed 4/12/19 showed LVEF 55-60%, no regional wall motion abnormalities, mild aortic stenosis, and mild aortic root dilation, . Patient was seen by electrophysiologist Dr. Bentley who recommended rate control, started metoprolol XL 12.5mg daily, 3 day zio monitor and to follow up with general cardiologist outpatient. CHADS vasc score 6, aspirin/plavix stopped and eliquis started. Patient was diuresed with IV furosemide and discharged on furosemide 40mg BID. Admission weight 215#. Discharge weight 188#.     He was discharged to TCU.     Patient is here today for CORE enrollment/post hospital followup. His 3 daughters are with for clinic visit today.     Patient reports feeling good. Monitoring weights daily at facility, he states weight has been 188# the past 3 days. Denies lower extremity edema, which he states is an improvement. Denies abdominal distention/bloating. Denies shortness of breath at rest. Denies exertional dyspnea. Daughter notes patient was having dyspnea prior to hospitalization, patient denies this. Daughter feels breathing has improved. Sleeping okay, getting up to urinate disrupting his sleep. Denies orthopnea or PND. Energy levels are good, improving. Patient denies chest pain or chest tightness. Denies dizziness, lightheadedness or other presyncopal symptoms. Denies tachycardia or palpitations. Denies episodes of bleeding.     Labs today show bump in creatinine 1.03 -> 1.41 today, electrolytes stable. Blood pressure 120/72 and HR 66 in clinic today.    Appetite good. Trying to limit sodium. Not adding additional salt to  foods. Prior to TCU stay was getting meals on wheels, 8 meals per month in building and daughters bringing meals. Currently drinking 2-3 glasses of water. Working with PT/OT. At home was doing a light weight lifting routine and a routine at on the bed for leg exercises. Seldom alcohol use. Denies tobacco use.         Recent Hospitalizations   19-19 presenting with generalized weakness, he was found to have new onset atrial fibrillation and UTI. Patient was also noted to be in an acute diastolic heart failure exacerbation. Discharged on furosemide 40mg BID.         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   Social Needs     Financial resource strain: Not on file     Food insecurity:     Worry: Not on file     Inability: Not on file     Transportation needs:     Medical: Not on file     Non-medical: Not on file   Tobacco Use     Smoking status: Former Smoker     Last attempt to quit: 1980     Years since quittin.3     Smokeless tobacco: Never Used     Tobacco comment: 1 pot of coffee per day, diet cola daily    Substance and Sexual Activity     Alcohol use: Yes     Comment: 1 every couple of weeks      Drug use: Never     Sexual activity: Not on file   Lifestyle     Physical activity:     Days per week: Not on file     Minutes per session: Not on file     Stress: Not on file   Relationships     Social connections:     Talks on phone: Not on file     Gets together: Not on file     Attends [employer] service: Not on file     Active member of club or organization: Not on file     Attends meetings of clubs or organizations: Not on file     Relationship status: Not on file      "Intimate partner violence:     Fear of current or ex partner: Not on file     Emotionally abused: Not on file     Physically abused: Not on file     Forced sexual activity: Not on file   Other Topics Concern     Parent/sibling w/ CABG, MI or angioplasty before 65F 55M? Not Asked   Social History Narrative    , has 3 children, lives by himself in an apartment.  Has living will, desires DNR/DNI.  (last updated 4/11/2019)             Review of Systems:   Skin:  Negative     Eyes:  Negative    ENT:  Negative    Respiratory:  Negative    Cardiovascular:  Negative edema;Positive for  Gastroenterology: Negative    Genitourinary:  Negative    Musculoskeletal:  Negative    Neurologic:  Negative    Psychiatric:  Negative    Heme/Lymph/Imm:  Negative    Endocrine:  Positive for diabetes         Physical Exam:   Vitals: /72   Pulse 66   Ht 1.778 m (5' 10\")   Wt 86.6 kg (191 lb)   SpO2 97%   BMI 27.41 kg/m     Wt Readings from Last 4 Encounters:   04/18/19 86.6 kg (191 lb)   04/14/19 85.3 kg (188 lb)   04/14/19 85.5 kg (188 lb 9.6 oz)     GEN: well nourished, in no acute distress.  HEENT:  Pupils equal, round. Sclerae nonicteric.   NECK: Supple, no masses appreciated. No JVD appreciated on exam  C/V:  Irregularly irregular rate and rhythm, 2/6 systolic murmur  RESP: Respirations are unlabored. Clear to auscultation bilaterally without wheezing, rales, or rhonchi.  GI: Abdomen soft, nontender.  EXTREM: No LE in right lower extremity, Trace LE edema in left lower extremity   NEURO: Alert and oriented, cooperative.  SKIN: Warm and dry.       Data:   ECHO 4/12/18  The left ventricle is normal in size.  The visual ejection fraction is estimated at 55-60%.  Diastolic Doppler findings (E/E' ratio and/or other parameters) suggest left  ventricular filling pressures are increased.  No regional wall motion abnormalities noted.  There is sclerosis, calcification, and restriction of the aortic valve opening  compatible " with mild aortic stenosis.  Mild aortic root dilatation.  The rhythm was atrial fibrillation.  The patient exhibited occasional PVCs.    CBC RESULTS:  Lab Results   Component Value Date    WBC 11.2 (H) 04/11/2019    RBC 4.21 (L) 04/11/2019    HGB 12.8 (L) 04/11/2019    HCT 38.4 (L) 04/11/2019    MCV 91 04/11/2019    MCH 30.4 04/11/2019    MCHC 33.3 04/11/2019    RDW 15.5 (H) 04/11/2019     04/14/2019     BMP RESULTS:  Lab Results   Component Value Date     04/18/2019    POTASSIUM 4.0 04/18/2019    CHLORIDE 98 04/18/2019    CO2 33 (H) 04/18/2019    ANIONGAP 10 04/18/2019     (H) 04/18/2019    BUN 28 04/18/2019    CR 1.41 (H) 04/18/2019    GFRESTIMATED 48 (L) 04/18/2019    GFRESTBLACK 58 (L) 04/18/2019    JESU 9.4 04/18/2019      A1C RESULTS:  Lab Results   Component Value Date    A1C 8.3 (H) 04/11/2019            Medications     Current Outpatient Medications   Medication Sig Dispense Refill     apixaban ANTICOAGULANT (ELIQUIS) 5 MG tablet Take 1 tablet (5 mg) by mouth 2 times daily       atorvastatin (LIPITOR) 20 MG tablet Take 20 mg by mouth At Bedtime        cholecalciferol 1000 units TABS Take 2,000 Units by mouth daily        fish oil-omega-3 fatty acids 1000 MG capsule Take 1 g by mouth daily       furosemide (LASIX) 40 MG tablet Take 1 tablet (40 mg) by mouth daily 90 tablet 1     insulin aspart (NOVOLOG VIAL) 100 UNITS/ML vial Give before meals and before bed:  For Pre-Meal Glucose:  140-189 give 1 unit   190-239 give 2 units   240-289 give 3 units   290-339 give 4 units   = or >340 give 5 units     For Bedtime Glucose  200 - 239 give 1 units   240 - 289 give 1.5 units  290 - 339 give 2 units  = or >340 give 2.5 units 10 mL 0     lisinopril (PRINIVIL/ZESTRIL) 20 MG tablet Take 20 mg by mouth daily       metFORMIN (GLUCOPHAGE) 500 MG tablet Take 2 tablets (1,000 mg) by mouth 2 times daily (with meals)       metoprolol succinate ER (TOPROL-XL) 25 MG 24 hr tablet Take 0.5 tablets (12.5 mg) by  mouth daily       tamsulosin (FLOMAX) 0.4 MG capsule Take 1 capsule (0.4 mg) by mouth daily            Past Medical History     Past Medical History:   Diagnosis Date     Cerebral infarction (H)     Residual left hemiparesis, on Plavix     Detached retina, left      DM2 (diabetes mellitus, type 2) (H)      Hypertension      Lymphoma     treated with Chemo/Rad in , recurrence 2017 tx'd with chemo     Past Surgical History:   Procedure Laterality Date     APPENDECTOMY  194     BACK SURGERY       EYE SURGERY      cataracts     Family History   Problem Relation Age of Onset     Abdominal Aortic Aneurysm Mother          at age 99     Myocardial Infarction Father          age 65            Allergies   Patient has no known allergies.        CHANEL Canas [facility]  Pager: [phone]

## 2019-04-25 NOTE — PROGRESS NOTES
[facility] DISCHARGE SUMMARY  PATIENT'S NAME: Renato Mccartney  YOB: 1932  MEDICAL RECORD NUMBER:  [id number]  Place of Service where encounter took place:  [facility] ([facility] [[id number]]    PRIMARY CARE PROVIDER AND CLINIC RESPONSIBLE AFTER TRANSFER:   Diane Falcon NP, [place] [address] [facility] [address] / [facility]    Assisted Living: [facility]     Transferring providers: YUDITH Walter, Mendoza Carmona MD  Recent Hospitalization/ED:  [facility] Hospital stay 04.11.19 to 04.14.19.  Date of SNF Admission: April / 14 / 2019  Date of SNF (anticipated) Discharge: April / 26 / 2019  Discharged to: previous independent living [facility]  Cognitive Scores: not available  Physical Function: Ambulating independently with walker  DME: None    CODE STATUS/ADVANCE DIRECTIVES DISCUSSION:  DNR / DNI   ALLERGIES: Patient has no known allergies.    DISCHARGE DIAGNOSIS/NURSING FACILITY COURSE:   Acute diastolic congestive heart failure (H)  Aortic valve stenosis, etiology of cardiac valve disease unspecified  Physical deconditioning  Persistent atrial fibrillation (H)  Essential hypertension  Cerebrovascular accident (CVA), unspecified mechanism (H)  Acute on chronic. New Afib dx during hospital stay. Pulse range 59-72. Continue Metoprolol,Fish Oil, and Eliquis as ordered. No SOB. O2 sats range 93-99% on room air. Continue Lasix as ordered. PCP to monitor BMP and CBC routinely. Vital signs, daily weights, and pain assessment to be monitored by home care nurse. SBP range  in recent days.     Urinary tract infection without hematuria, site unspecified  Resolved. Has completed keflex for e-coli UTI. Reports urinary frequency has improved. Afebrile. Continue Flomax.    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 Not well controlled. Sliding scale insulin discontinued, does not check it at home. Hgb A1C elevated at 8.3.   Metformin dose increased. Continue this at discharge. PCP to routinely monitor Hgb A1C and blood glucose.    Hyperlipidemia, unspecified hyperlipidemia type  Chronic. No cholesterol level or lipid panel on file. PCP to routinely follow. Continue Lipitor as ordered.    Past Medical History:  has a past medical history of Cerebral infarction (H) (2004), Detached retina, left, DM2 (diabetes mellitus, type 2) (H) (2000), Hypertension, and Lymphoma (2008).    Discharge Medications:  Current Outpatient Medications   Medication Sig Dispense Refill     apixaban ANTICOAGULANT (ELIQUIS) 5 MG tablet Take 1 tablet (5 mg) by mouth 2 times daily       atorvastatin (LIPITOR) 20 MG tablet Take 20 mg by mouth At Bedtime        cholecalciferol 1000 units TABS Take 2,000 Units by mouth daily        fish oil-omega-3 fatty acids 1000 MG capsule Take 1 g by mouth daily       furosemide (LASIX) 40 MG tablet Take 1 tablet (40 mg) by mouth daily 90 tablet 1     lisinopril (PRINIVIL/ZESTRIL) 20 MG tablet Take 20 mg by mouth daily       metFORMIN (GLUCOPHAGE) 500 MG tablet Take 2 tablets (1,000 mg) by mouth 2 times daily (with meals)       metoprolol succinate ER (TOPROL-XL) 25 MG 24 hr tablet Take 0.5 tablets (12.5 mg) by mouth daily       tamsulosin (FLOMAX) 0.4 MG capsule Take 1 capsule (0.4 mg) by mouth daily       insulin aspart (NOVOLOG VIAL) 100 UNITS/ML vial Give before meals and before bed:  For Pre-Meal Glucose:  140-189 give 1 unit   190-239 give 2 units   240-289 give 3 units   290-339 give 4 units   = or >340 give 5 units     For Bedtime Glucose  200 - 239 give 1 units   240 - 289 give 1.5 units  290 - 339 give 2 units  = or >340 give 2.5 units (Patient not taking: Reported on 4/25/2019) 10 mL 0     Medication Changes/Rationale:     Sliding scale insulin and blood glucose monitoring discontinued prior to discharge home. Metformin dose increased.    O2 was required after pleural effusion. Has been weaned off.    Controlled  medications sent with patient:   not applicable/none     ROS:   10 point ROS of systems including Constitutional, Eyes, Respiratory, Cardiovascular, Gastroenterology, Genitourinary, Integumentary, Musculoskeletal, Psychiatric were all negative except for pertinent positives noted in my HPI.    Physical Exam:   Vitals: /59   Pulse 59   Temp 96.7  F (35.9  C)   Resp 18   Wt 84.5 kg (186 lb 4.8 oz)   SpO2 97%   BMI 26.73 kg/m    BMI= Body mass index is 26.73 kg/m .    GENERAL APPEARANCE:  Alert, in no distress, pleasant, cooperative, oriented x 3  EYES:  EOM, lids, pupils and irises normal, sclera clear and conjunctiva normal, no discharge or mattering on lids or lashes noted  ENT:  Mouth normal, moist mucous membranes, nose normal without drainage or crusting, external ears without lesions, hearing acuity intact  NECK: supple, symmetrical, trachea midline  RESP:  respiratory effort normal, no chest wall tenderness, no respiratory distress, Lung sounds clear, patient is on room air  CV:  Auscultation of heart done, rate and rhythm regular, no murmur, no rub or gallop. Edema none bilateral lower extremities.  ABDOMEN:  normal bowel sounds, soft, nontender, no palpable masses.  M/S:   Gait and station ambulates independently with walker, no tenderness or swelling of the joints; able to move all extremities. Left upper and lower extremities are weaker than right, digits intact  SKIN:  Inspection and palpation of skin and subcutaneous tissue: skin on extremities warm, dry and intact without rashes  NEURO: cranial nerves 2-12 grossly intact, no facial asymmetry, no speech deficits and able to follow directions, moves all extremities symmetrically  PSYCH:  insight and judgement intact, memory intact, affect and mood normal    SNF labs: Recent labs in [facility] reviewed by me today.  and   Most Recent 3 CBC's:  Recent Labs   Lab Test 04/14/19  0700 04/11/19  1314 01/28/19  1527   WBC  --  11.2* 6.1   HGB  --  12.8*  11.9*   MCV  --  91 92    178 161     Most Recent 3 BMP's:  Recent Labs   Lab Test 04/18/19  0948 04/18/19 04/14/19  0700    141 142   POTASSIUM 4.0 4.0 4.5   CHLORIDE 98 98 107   CO2 33* 30* 29   BUN 28 31* 27   CR 1.41* 1.08 1.03   ANIONGAP 10 13 6   JESU 9.4 9.7 8.6   * 113* 160*     Most Recent 2 LFT's:No lab results found.     Lab Results   Component Value Date    A1C 8.3 04/11/2019     [id number] Score    6       DISCHARGE PLAN:    Follow up labs: CBC, BMP, Lipid panel/Cholesterol level, and HgbA1C to be routinely monitored by PCP    Medical Follow Up:      Follow up with primary care provider in 2-3 weeks     Follow up with cardiology as directed    MTM referral needed and placed by this provider:  No    Current [place] scheduled appointments:     Future Appointments   Date Time Provider Department Center   5/2/2019 12:20 PM [facility]   5/2/2019  1:15 PM Deep Scanlon MD [facility]       Discharge Services: Home Care:  Occupational Therapy, Physical Therapy, Registered Nurse, Home Health Aide and medication management, pain assessment, vital signs, daily weight monitoring.    Discharge Instructions Verbalized to Patient at Discharge:     Weigh yourself daily in the morning and keep a record. Call your primary clinic: a) if you are more short of breath, or b) if your weight changes more than 3 pounds in one day or more than 5 pounds in one week.     Electronically signed by:  Tram Velasco NP Student    This patient was seen along with a nurse practitioner student, Tram Velasco.  The histories and reviews of systems were obtained by student and confirmed by myself. All objective, assessments and plans were completed by myself.   Osiris Gonzalez     TOTAL DISCHARGE TIME:   45 min   Electronically signed by CHANEL Jaimes GNP     Home care Face to Face documentation done in [facility] attached to Home care orders for [facility].

## 2019-04-26 NOTE — LETTER
4/26/2019        RE: Renato Mccartney  [address]  [facility] [address]        [place] [facility] DISCHARGE SUMMARY  PATIENT'S NAME: Renato Mccartney  YOB: 1932  MEDICAL RECORD NUMBER:  [id number]  Place of Service where encounter took place:  [facility] ([facility] [[id number]]    PRIMARY CARE PROVIDER AND CLINIC RESPONSIBLE AFTER TRANSFER:   Diane Falcon NP, [employer] [address] [facility] [address] / [facility]    Assisted Living: [facility]     Transferring providers: YUDITH Walter, Mendoza Carmona MD  Recent Hospitalization/ED:  [facility] Hospital stay 04.11.19 to 04.14.19.  Date of SNF Admission: April / 14 / 2019  Date of SNF (anticipated) Discharge: April / 26 / 2019  Discharged to: previous independent living [facility]  Cognitive Scores: not available  Physical Function: Ambulating independently with walker  DME: None    CODE STATUS/ADVANCE DIRECTIVES DISCUSSION:  DNR / DNI   ALLERGIES: Patient has no known allergies.    DISCHARGE DIAGNOSIS/NURSING FACILITY COURSE:   Acute diastolic congestive heart failure (H)  Aortic valve stenosis, etiology of cardiac valve disease unspecified  Physical deconditioning  Persistent atrial fibrillation (H)  Essential hypertension  Cerebrovascular accident (CVA), unspecified mechanism (H)  Acute on chronic. New Afib dx during hospital stay. Pulse range 59-72. Continue Metoprolol,Fish Oil, and Eliquis as ordered. No SOB. O2 sats range 93-99% on room air. Continue Lasix as ordered. PCP to monitor BMP and CBC routinely. Vital signs, daily weights, and pain assessment to be monitored by home care nurse. SBP range  in recent days.     Urinary tract infection without hematuria, site unspecified  Resolved. Has completed keflex for e-coli UTI. Reports urinary frequency has improved. Afebrile. Continue Flomax.    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 Not well  controlled. Sliding scale insulin discontinued, does not check it at home. Hgb A1C elevated at 8.3.  Metformin dose increased. Continue this at discharge. PCP to routinely monitor Hgb A1C and blood glucose.    Hyperlipidemia, unspecified hyperlipidemia type  Chronic. No cholesterol level or lipid panel on file. PCP to routinely follow. Continue Lipitor as ordered.    Past Medical History:  has a past medical history of Cerebral infarction (H) (2004), Detached retina, left, DM2 (diabetes mellitus, type 2) (H) (2000), Hypertension, and Lymphoma (2008).    Discharge Medications:  Current Outpatient Medications   Medication Sig Dispense Refill     apixaban ANTICOAGULANT (ELIQUIS) 5 MG tablet Take 1 tablet (5 mg) by mouth 2 times daily       atorvastatin (LIPITOR) 20 MG tablet Take 20 mg by mouth At Bedtime        cholecalciferol 1000 units TABS Take 2,000 Units by mouth daily        fish oil-omega-3 fatty acids 1000 MG capsule Take 1 g by mouth daily       furosemide (LASIX) 40 MG tablet Take 1 tablet (40 mg) by mouth daily 90 tablet 1     lisinopril (PRINIVIL/ZESTRIL) 20 MG tablet Take 20 mg by mouth daily       metFORMIN (GLUCOPHAGE) 500 MG tablet Take 2 tablets (1,000 mg) by mouth 2 times daily (with meals)       metoprolol succinate ER (TOPROL-XL) 25 MG 24 hr tablet Take 0.5 tablets (12.5 mg) by mouth daily       tamsulosin (FLOMAX) 0.4 MG capsule Take 1 capsule (0.4 mg) by mouth daily       insulin aspart (NOVOLOG VIAL) 100 UNITS/ML vial Give before meals and before bed:  For Pre-Meal Glucose:  140-189 give 1 unit   190-239 give 2 units   240-289 give 3 units   290-339 give 4 units   = or >340 give 5 units     For Bedtime Glucose  200 - 239 give 1 units   240 - 289 give 1.5 units  290 - 339 give 2 units  = or >340 give 2.5 units (Patient not taking: Reported on 4/25/2019) 10 mL 0     Medication Changes/Rationale:     Sliding scale insulin and blood glucose monitoring discontinued prior to discharge home. Metformin  dose increased.    O2 was required after pleural effusion. Has been weaned off.    Controlled medications sent with patient:   not applicable/none     ROS:   10 point ROS of systems including Constitutional, Eyes, Respiratory, Cardiovascular, Gastroenterology, Genitourinary, Integumentary, Musculoskeletal, Psychiatric were all negative except for pertinent positives noted in my HPI.    Physical Exam:   Vitals: /59   Pulse 59   Temp 96.7  F (35.9  C)   Resp 18   Wt 84.5 kg (186 lb 4.8 oz)   SpO2 97%   BMI 26.73 kg/m    BMI= Body mass index is 26.73 kg/m .    GENERAL APPEARANCE:  Alert, in no distress, pleasant, cooperative, oriented x 3  EYES:  EOM, lids, pupils and irises normal, sclera clear and conjunctiva normal, no discharge or mattering on lids or lashes noted  ENT:  Mouth normal, moist mucous membranes, nose normal without drainage or crusting, external ears without lesions, hearing acuity intact  NECK: supple, symmetrical, trachea midline  RESP:  respiratory effort normal, no chest wall tenderness, no respiratory distress, Lung sounds clear, patient is on room air  CV:  Auscultation of heart done, rate and rhythm regular, no murmur, no rub or gallop. Edema none bilateral lower extremities.  ABDOMEN:  normal bowel sounds, soft, nontender, no palpable masses.  M/S:   Gait and station ambulates independently with walker, no tenderness or swelling of the joints; able to move all extremities. Left upper and lower extremities are weaker than right, digits intact  SKIN:  Inspection and palpation of skin and subcutaneous tissue: skin on extremities warm, dry and intact without rashes  NEURO: cranial nerves 2-12 grossly intact, no facial asymmetry, no speech deficits and able to follow directions, moves all extremities symmetrically  PSYCH:  insight and judgement intact, memory intact, affect and mood normal    SNF labs: Recent labs in [facility] reviewed by me today.  and   Most Recent 3 CBC's:  Recent Labs    Lab Test 04/14/19  0700 04/11/19  1314 01/28/19  1527   WBC  --  11.2* 6.1   HGB  --  12.8* 11.9*   MCV  --  91 92    178 161     Most Recent 3 BMP's:  Recent Labs   Lab Test 04/18/19  0948 04/18/19 04/14/19  0700    141 142   POTASSIUM 4.0 4.0 4.5   CHLORIDE 98 98 107   CO2 33* 30* 29   BUN 28 31* 27   CR 1.41* 1.08 1.03   ANIONGAP 10 13 6   JESU 9.4 9.7 8.6   * 113* 160*     Most Recent 2 LFT's:No lab results found.     Lab Results   Component Value Date    A1C 8.3 04/11/2019     [id number] Score    6       DISCHARGE PLAN:    Follow up labs: CBC, BMP, Lipid panel/Cholesterol level, and HgbA1C to be routinely monitored by PCP    Medical Follow Up:      Follow up with primary care provider in 2-3 weeks     Follow up with cardiology as directed    MTM referral needed and placed by this provider:  No    Current [place] scheduled appointments:     Future Appointments   Date Time Provider Department Center   5/2/2019 12:20 PM [facility]   5/2/2019  1:15 PM Deep Scanlon MD [facility] CL[facility]       Discharge Services: Home Care:  Occupational Therapy, Physical Therapy, Registered Nurse, Home Health Aide and medication management, pain assessment, vital signs, daily weight monitoring.    Discharge Instructions Verbalized to Patient at Discharge:     Weigh yourself daily in the morning and keep a record. Call your primary clinic: a) if you are more short of breath, or b) if your weight changes more than 3 pounds in one day or more than 5 pounds in one week.     Electronically signed by:  Tram Velasco NP Student    This patient was seen along with a nurse practitioner student, Tram Velasco.  The histories and reviews of systems were obtained by student and confirmed by myself. All objective, assessments and plans were completed by myself.   Osiris Gonzalez     TOTAL DISCHARGE TIME:   45 min   Electronically signed by Osiris Gonzalez, CHANEL, GNP     Home care Face to Face  documentation done in [facility] attached to Home care orders for [facility].       Sincerely,        CHANEL Jaimes CNP

## 2019-04-26 NOTE — NURSING NOTE
[place] [facility] DISCHARGE SUMMARY  PATIENT'S NAME: Renato Mccartney  YOB: 1932  MEDICAL RECORD NUMBER:  [id number]  Place of Service where encounter took place:  [facility] ([facility] [[id number]]    PRIMARY CARE PROVIDER AND CLINIC RESPONSIBLE AFTER TRANSFER:   Diane Falcon NP, [employer] [address] [facility] 10[address] / [facility]    Assisted Living: [facility]     Transferring providers: YUDITH Walter, Mendoza Carmona MD  Recent Hospitalization/ED:  [facility] Hospital stay 04.11.19 to 04.14.19.  Date of SNF Admission: April / 14 / 2019  Date of SNF (anticipated) Discharge: April / 26 / 2019  Discharged to: previous independent living [facility]  Cognitive Scores: not available  Physical Function: Ambulating independently with walker  DME: None    CODE STATUS/ADVANCE DIRECTIVES DISCUSSION:  DNR / DNI   ALLERGIES: Patient has no known allergies.    DISCHARGE DIAGNOSIS/NURSING FACILITY COURSE:   Acute diastolic congestive heart failure (H)  Aortic valve stenosis, etiology of cardiac valve disease unspecified  Physical deconditioning  Persistent atrial fibrillation (H)  Essential hypertension  Cerebrovascular accident (CVA), unspecified mechanism (H)  Acute on chronic. New Afib dx during hospital stay. Pulse range 59-72. Continue Metoprolol,Fish Oil, and Eliquis as ordered. No SOB. O2 sats range 93-99% on room air. Continue Lasix as ordered. PCP to monitor BMP and CBC routinely. Vital signs, daily weights, and pain assessment to be monitored by home care nurse. SBP range  in recent days.     Urinary tract infection without hematuria, site unspecified  Resolved. Has completed keflex for e-coli UTI. Reports urinary frequency has improved. Afebrile. Continue Flomax.    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 Not well controlled. Sliding scale insulin discontinued, does not check it at home. Hgb A1C elevated at 8.3.   Metformin dose increased. Continue this at discharge. PCP to routinely monitor Hgb A1C and blood glucose.    Hyperlipidemia, unspecified hyperlipidemia type  Chronic. No cholesterol level or lipid panel on file. PCP to routinely follow. Continue Lipitor as ordered.    Past Medical History:  has a past medical history of Cerebral infarction (H) (2004), Detached retina, left, DM2 (diabetes mellitus, type 2) (H) (2000), Hypertension, and Lymphoma (2008).    Discharge Medications:  Current Outpatient Medications   Medication Sig Dispense Refill     apixaban ANTICOAGULANT (ELIQUIS) 5 MG tablet Take 1 tablet (5 mg) by mouth 2 times daily       atorvastatin (LIPITOR) 20 MG tablet Take 20 mg by mouth At Bedtime        cholecalciferol 1000 units TABS Take 2,000 Units by mouth daily        fish oil-omega-3 fatty acids 1000 MG capsule Take 1 g by mouth daily       furosemide (LASIX) 40 MG tablet Take 1 tablet (40 mg) by mouth daily 90 tablet 1     lisinopril (PRINIVIL/ZESTRIL) 20 MG tablet Take 20 mg by mouth daily       metFORMIN (GLUCOPHAGE) 500 MG tablet Take 2 tablets (1,000 mg) by mouth 2 times daily (with meals)       metoprolol succinate ER (TOPROL-XL) 25 MG 24 hr tablet Take 0.5 tablets (12.5 mg) by mouth daily       tamsulosin (FLOMAX) 0.4 MG capsule Take 1 capsule (0.4 mg) by mouth daily       insulin aspart (NOVOLOG VIAL) 100 UNITS/ML vial Give before meals and before bed:  For Pre-Meal Glucose:  140-189 give 1 unit   190-239 give 2 units   240-289 give 3 units   290-339 give 4 units   = or >340 give 5 units     For Bedtime Glucose  200 - 239 give 1 units   240 - 289 give 1.5 units  290 - 339 give 2 units  = or >340 give 2.5 units (Patient not taking: Reported on 4/25/2019) 10 mL 0     Medication Changes/Rationale:     Sliding scale insulin and blood glucose monitoring discontinued prior to discharge home. Metformin dose increased.    O2 was required after pleural effusion. Has been weaned off.    Controlled  medications sent with patient:   not applicable/none     ROS:   10 point ROS of systems including Constitutional, Eyes, Respiratory, Cardiovascular, Gastroenterology, Genitourinary, Integumentary, Musculoskeletal, Psychiatric were all negative except for pertinent positives noted in my HPI.    Physical Exam:   Vitals: /59   Pulse 59   Temp 96.7  F (35.9  C)   Resp 18   Wt 84.5 kg (186 lb 4.8 oz)   SpO2 97%   BMI 26.73 kg/m    BMI= Body mass index is 26.73 kg/m .    GENERAL APPEARANCE:  Alert, in no distress, pleasant, cooperative, oriented x 3  EYES:  EOM, lids, pupils and irises normal, sclera clear and conjunctiva normal, no discharge or mattering on lids or lashes noted  ENT:  Mouth normal, moist mucous membranes, nose normal without drainage or crusting, external ears without lesions, hearing acuity intact  NECK: supple, symmetrical, trachea midline  RESP:  respiratory effort normal, no chest wall tenderness, no respiratory distress, Lung sounds clear, patient is on room air  CV:  Auscultation of heart done, rate and rhythm regular, no murmur, no rub or gallop. Edema none bilateral lower extremities.  ABDOMEN:  normal bowel sounds, soft, nontender, no palpable masses.  M/S:   Gait and station ambulates independently with walker, no tenderness or swelling of the joints; able to move all extremities. Left upper and lower extremities are weaker than right, digits intact  SKIN:  Inspection and palpation of skin and subcutaneous tissue: skin on extremities warm, dry and intact without rashes  NEURO: cranial nerves 2-12 grossly intact, no facial asymmetry, no speech deficits and able to follow directions, moves all extremities symmetrically  PSYCH:  insight and judgement intact, memory intact, affect and mood normal    SNF labs: Recent labs in [facility] reviewed by me today.  and   Most Recent 3 CBC's:  Recent Labs   Lab Test 04/14/19  0700 04/11/19  1314 01/28/19  1527   WBC  --  11.2* 6.1   HGB  --  12.8*  11.9*   MCV  --  91 92    178 161     Most Recent 3 BMP's:  Recent Labs   Lab Test 04/18/19  0948 04/18/19 04/14/19  0700    141 142   POTASSIUM 4.0 4.0 4.5   CHLORIDE 98 98 107   CO2 33* 30* 29   BUN 28 31* 27   CR 1.41* 1.08 1.03   ANIONGAP 10 13 6   JESU 9.4 9.7 8.6   * 113* 160*     Most Recent 2 LFT's:No lab results found.     Lab Results   Component Value Date    A1C 8.3 04/11/2019     [id number] Score    6       DISCHARGE PLAN:    Follow up labs: CBC, BMP, Lipid panel/Cholesterol level, and HgbA1C to be routinely monitored by PCP    Medical Follow Up:      Follow up with primary care provider in 2-3 weeks     Follow up with cardiology as directed    MTM referral needed and placed by this provider:  No    Current [place] scheduled appointments:     Future Appointments   Date Time Provider Department Center   5/2/2019 12:20 PM [facility]   5/2/2019  1:15 PM Deep Scanlon MD [facility] CL[facility]       Discharge Services: Home Care:  Occupational Therapy, Physical Therapy, Registered Nurse, Home Health Aide and medication management, pain assessment, vital signs, daily weight monitoring.    Discharge Instructions Verbalized to Patient at Discharge:     Weigh yourself daily in the morning and keep a record. Call your primary clinic: a) if you are more short of breath, or b) if your weight changes more than 3 pounds in one day or more than 5 pounds in one week.     Electronically signed by:  Tram Velasco NP Student    This patient was seen along with a nurse practitioner student, Tram Velasco.  The histories and reviews of systems were obtained by student and confirmed by myself. All objective, assessments and plans were completed by myself.   Osiris Gonzalez     TOTAL DISCHARGE TIME:   45 min   Electronically signed by CHANEL Jaimes GNP     Home care Face to Face documentation done in [facility] attached to Home care orders for [facility].

## 2019-04-26 NOTE — PROGRESS NOTES
Call from patient's daughter - they did  eliquis but cost was $400; family will be checking with insurance to see if thi cost was due to deductible and reduced cost in future or if monthly cost, if high cost will check with insurance on alternative cost of other NOAC's for potential coverage. Daughter will call next week with update. If Eliquis is found to be cost prohibitive, will discuss with KOMAL.  Sue Cote RN 04/26/19 4:53 PM

## 2019-04-29 NOTE — PROGRESS NOTES
Called to daughter Evelina - had spoken to Raven last week ie cost of Eliquis.  They have not yet contacted insurance company to inquire on future cost, other NOAC's that may be covered, or if prior authorization is needed. Evelina states they will be contacting insurance company and call back if cost prohibitive.  Sue Cote RN 04/29/19 4:31 PM

## 2019-05-02 NOTE — PROGRESS NOTES
HPI and Plan:   Today I had the pleasure of seeing Renato Mccartney at [facility] in [place]. He is a pleasant 86 year old patient with a past medical history of ischemic stroke in 2001, diastolic heart failure, hyperlipidemia, mild aortic stenosis with a mean gradient of 12 mmHg, type II diabetes, hypertension who is here in the clinic for a follow-up visit after being discharged from the hospital.      The patient was recently admitted to the hospital 3 weeks ago for severe UTI and was found to be in atrial fibrillation.  He had an echocardiogram which showed ejection fraction of 55 to 60% with elevated filling pressures.  Her RV size and function were normal.  There was mild aortic stenosis with a mean gradient of 12 mmHg but no other valvular disease.  He was started on 5 mg of Eliquis twice daily for elevated  CHICO VASC score and treatment 5 mg of metoprolol prior to discharge.  He was also started on 40 mg of Lasix daily.  His daughter believes that he is diuretic diuresing a lot on 40 mg of Lasix and is not drinking enough water.  She also believes he has to frequently wake up at night to use the restroom with something that is making him fatigued during the day.  He otherwise does not have any other complaints today.  He denies chest pain, shortness of breath, palpitations, lower extremity edema, orthopnea or PND.  He is able to lie flat and has not noticed any swelling of his legs.  He had blood work prior to the visit today which shows increase in creatinine to 1.56.    Assessment and plan  1.  Diastolic heart failure  2.  Persistent atrial fibrillation  3.  Diabetes mellitus  4.  Hypertension  5.  Hyperlipidemia  6.  Worsening renal function    Discussion  The patient is well compensated from diastolic heart failure point of view.  He does not have lower extremity edema  or any other signs on physical examination that would indicate volume overload.  I think he is actually  intravascularly depletedsomething which may explain a gradual worsening of his renal failure.  He  Bun to creatinine ratio is also greater than 20 indicating prerenal etiology of his renal failure.  I would discontinue Lasix and repeat BMP in 2 weeks.  And schedule him to see an KOMAL after the blood work.  I am hoping that discontinuation of Lasix will lead to normalization of renal function. If he is found to have lower extremity edema or other signs of heart failure he can be restarted on Lasix, hopefully will reduce.  However, if the creatinine does not start to trend down we will have him evaluated by nephrology.  Also, if the creatinine stays over 1.5 we will decrease the dose of Eliquis from 5 mg twice daily to 2.5 mg p.o. twice daily.    Plan  1.  BMP in 2 weeks followed by an KOMAL visit  2.  If creatinine stays elevated he will be referred to see a nephrologist  3.  If creatinine stays over 1.5 decreased the dose of Eliquis to 2.5 mg p.o. twice daily  4.  If creatinine normalizes he can be started on a standing dose of Lasix at 10 mg p.o. per day with instructions to up titrate based on lower extremity edema and symptoms    Thank you for allowing me to participate in the care of Renato Scanlon MD  Cardiology    Orders Placed This Encounter   Procedures     Basic metabolic panel     Follow-Up with Nurse     Follow-Up with Cardiologist       No orders of the defined types were placed in this encounter.      Medications Discontinued During This Encounter   Medication Reason     furosemide (LASIX) 40 MG tablet Therapy completed       Encounter Diagnosis   Name Primary?     Persistent atrial fibrillation (H) Yes       CURRENT MEDICATIONS:  Current Outpatient Medications   Medication Sig Dispense Refill     apixaban ANTICOAGULANT (ELIQUIS) 5 MG tablet Take 1 tablet (5 mg) by mouth 2 times daily       atorvastatin (LIPITOR) 20 MG tablet Take 20 mg by mouth At Bedtime        cholecalciferol 1000  units TABS Take 2,000 Units by mouth daily        fish oil-omega-3 fatty acids 1000 MG capsule Take 1 g by mouth daily       lisinopril (PRINIVIL/ZESTRIL) 20 MG tablet Take 20 mg by mouth daily       metFORMIN (GLUCOPHAGE) 500 MG tablet Take 2 tablets (1,000 mg) by mouth 2 times daily (with meals)       metoprolol succinate ER (TOPROL-XL) 25 MG 24 hr tablet Take 0.5 tablets (12.5 mg) by mouth daily       tamsulosin (FLOMAX) 0.4 MG capsule Take 1 capsule (0.4 mg) by mouth daily         ALLERGIES   No Known Allergies    PAST MEDICAL HISTORY:  Past Medical History:   Diagnosis Date     Cerebral infarction (H)     Residual left hemiparesis, on Plavix     Detached retina, left      DM2 (diabetes mellitus, type 2) (H)      Hypertension      Lymphoma 2008    treated with Chemo/Rad in , recurrence 2017 tx'd with chemo       PAST SURGICAL HISTORY:  Past Surgical History:   Procedure Laterality Date     APPENDECTOMY       BACK SURGERY       EYE SURGERY      cataracts       FAMILY HISTORY:  Family History   Problem Relation Age of Onset     Abdominal Aortic Aneurysm Mother          at age 99     Myocardial Infarction Father          age 65       SOCIAL HISTORY:  Social History     Socioeconomic History     Marital status:      Spouse name: None     Number of children: 3     Years of education: None     Highest education level: None   Occupational History     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   Social Needs     Financial resource strain: None     Food insecurity:     Worry: None     Inability: None     Transportation needs:     Medical: None     Non-medical: None   Tobacco Use     Smoking status: Former Smoker     Last attempt to quit: 1980     Years since quittin.3     Smokeless tobacco: Never Used     Tobacco comment: 1 pot of coffee per day, diet cola daily    Substance and Sexual Activity     Alcohol use: Yes     Comment: 1 every couple of weeks      Drug use: Never  "    Sexual activity: None   Lifestyle     Physical activity:     Days per week: None     Minutes per session: None     Stress: None   Relationships     Social connections:     Talks on phone: None     Gets together: None     Attends Confucianism service: None     Active member of club or organization: None     Attends meetings of clubs or organizations: None     Relationship status: None     Intimate partner violence:     Fear of current or ex partner: None     Emotionally abused: None     Physically abused: None     Forced sexual activity: None   Other Topics Concern     Parent/sibling w/ CABG, MI or angioplasty before 65F 55M? Not Asked   Social History Narrative    , has 3 children, lives by himself in an apartment.  Has living will, desires DNR/DNI.  (last updated 4/11/2019)        Review of Systems:  Skin:  Negative       Eyes:  Negative      ENT:  Negative      Respiratory:  Negative       Cardiovascular:  Negative edema;Positive for    Gastroenterology: Negative      Genitourinary:  Negative      Musculoskeletal:  Negative      Neurologic:  Negative      Psychiatric:  Negative      Heme/Lymph/Imm:  Negative      Endocrine:  Positive for diabetes      Physical Exam:  Vitals: BP 91/59   Pulse 60   Ht 1.778 m (5' 10\")   Wt 84.4 kg (186 lb)   BMI 26.69 kg/m    Constitutional: awake, alert, no distress  Skin: Warm and dry to touch  Head: Normocephalic, atraumatic  Eyes: Conjunctivae and lids unremarkable, sclera white  ENT: No pallor or cyanosis  Neck: Carotid pulses are full and equal bilaterally.  Respiratory: Normal breath sounds, clear to auscultation, no use of sensory muscles, no wheezing or crepts  Cardiac: Regular rate and rhythm, S1-S2 normal.  No murmurs gallops or rubs.  Pulses full and equal bilaterally in all 4 extremities.  No pedal edema.   Abdomen: soft and nontender.  Extremities and musculoskeletal: No gross motor deficit  Neurological.  Affect normal  Psych: Alert and oriented " x3    Recent Lab Results:  LIPID RESULTS:  No results found for: CHOL, HDL, LDL, TRIG, CHOLHDLRATIO    LIVER ENZYME RESULTS:  No results found for: AST, ALT    CBC RESULTS:  Lab Results   Component Value Date    WBC 11.2 (H) 04/11/2019    RBC 4.21 (L) 04/11/2019    HGB 12.8 (L) 04/11/2019    HCT 38.4 (L) 04/11/2019    MCV 91 04/11/2019    MCH 30.4 04/11/2019    MCHC 33.3 04/11/2019    RDW 15.5 (H) 04/11/2019     04/14/2019       BMP RESULTS:  Lab Results   Component Value Date     05/02/2019    POTASSIUM 4.0 05/02/2019    CHLORIDE 98 05/02/2019    CO2 27 05/02/2019    ANIONGAP 15 05/02/2019     (H) 05/02/2019    BUN 44 (H) 05/02/2019    CR 1.56 (H) 05/02/2019    GFRESTIMATED 42 (L) 05/02/2019    GFRESTBLACK 51 (L) 05/02/2019    JESU 10.0 05/02/2019        A1C RESULTS:  Lab Results   Component Value Date    A1C 8.3 (H) 04/11/2019       INR RESULTS:  No results found for: INR    CC  No referring provider defined for this encounter.    All medical records were reviewed in detail and discussed with the patient. Greater than 45 mins were spent with the patient, 50% of this time was spent on counseling and coordination of care.  After visit summary was printed and given to the patient.

## 2019-05-02 NOTE — LETTER
5/2/2019    Diane Falcon NP  [facility] [address]  [facility] [address]    RE: Renato Mccartney       Dear Colleague,    I had the pleasure of seeing Renato Mccartney in the [facility].    HPI and Plan:   Today I had the pleasure of seeing Renato Mccartney at [facility] in [place]. He is a pleasant 86 year old patient with a past medical history of ischemic stroke in 2001, diastolic heart failure, hyperlipidemia, mild aortic stenosis with a mean gradient of 12 mmHg, type II diabetes, hypertension who is here in the clinic for a follow-up visit after being discharged from the hospital.      The patient was recently admitted to the hospital 3 weeks ago for severe UTI and was found to be in atrial fibrillation.  He had an echocardiogram which showed ejection fraction of 55 to 60% with elevated filling pressures.  Her RV size and function were normal.  There was mild aortic stenosis with a mean gradient of 12 mmHg but no other valvular disease.  He was started on 5 mg of Eliquis twice daily for elevated  CHICO VASC score and treatment 5 mg of metoprolol prior to discharge.  He was also started on 40 mg of Lasix daily.  His daughter believes that he is diuretic diuresing a lot on 40 mg of Lasix and is not drinking enough water.  She also believes he has to frequently wake up at night to use the restroom with something that is making him fatigued during the day.  He otherwise does not have any other complaints today.  He denies chest pain, shortness of breath, palpitations, lower extremity edema, orthopnea or PND.  He is able to lie flat and has not noticed any swelling of his legs.  He had blood work prior to the visit today which shows increase in creatinine to 1.56.    Assessment and plan  1.  Diastolic heart failure  2.  Persistent atrial fibrillation  3.  Diabetes mellitus  4.  Hypertension  5.  Hyperlipidemia  6.  Worsening renal  function    Discussion  The patient is well compensated from diastolic heart failure point of view.  He does not have lower extremity edema  or any other signs on physical examination that would indicate volume overload.  I think he is actually intravascularly depletedsomething which may explain a gradual worsening of his renal failure.  He  Bun to creatinine ratio is also greater than 20 indicating prerenal etiology of his renal failure.  I would discontinue Lasix and repeat BMP in 2 weeks.  And schedule him to see an KOMAL after the blood work.  I am hoping that discontinuation of Lasix will lead to normalization of renal function. If he is found to have lower extremity edema or other signs of heart failure he can be restarted on Lasix, hopefully will reduce.  However, if the creatinine does not start to trend down we will have him evaluated by nephrology.  Also, if the creatinine stays over 1.5 we will decrease the dose of Eliquis from 5 mg twice daily to 2.5 mg p.o. twice daily.    Plan  1.  BMP in 2 weeks followed by an OKMAL visit  2.  If creatinine stays elevated he will be referred to see a nephrologist  3.  If creatinine stays over 1.5 decreased the dose of Eliquis to 2.5 mg p.o. twice daily  4.  If creatinine normalizes he can be started on a standing dose of Lasix at 10 mg p.o. per day with instructions to up titrate based on lower extremity edema and symptoms    Thank you for allowing me to participate in the care of Renato DEAN Scanlon MD  Cardiology    Orders Placed This Encounter   Procedures     Basic metabolic panel     Follow-Up with Nurse     Follow-Up with Cardiologist       No orders of the defined types were placed in this encounter.      Medications Discontinued During This Encounter   Medication Reason     furosemide (LASIX) 40 MG tablet Therapy completed       Encounter Diagnosis   Name Primary?     Persistent atrial fibrillation (H) Yes       CURRENT MEDICATIONS:  Current  Outpatient Medications   Medication Sig Dispense Refill     apixaban ANTICOAGULANT (ELIQUIS) 5 MG tablet Take 1 tablet (5 mg) by mouth 2 times daily       atorvastatin (LIPITOR) 20 MG tablet Take 20 mg by mouth At Bedtime        cholecalciferol 1000 units TABS Take 2,000 Units by mouth daily        fish oil-omega-3 fatty acids 1000 MG capsule Take 1 g by mouth daily       lisinopril (PRINIVIL/ZESTRIL) 20 MG tablet Take 20 mg by mouth daily       metFORMIN (GLUCOPHAGE) 500 MG tablet Take 2 tablets (1,000 mg) by mouth 2 times daily (with meals)       metoprolol succinate ER (TOPROL-XL) 25 MG 24 hr tablet Take 0.5 tablets (12.5 mg) by mouth daily       tamsulosin (FLOMAX) 0.4 MG capsule Take 1 capsule (0.4 mg) by mouth daily         ALLERGIES   No Known Allergies    PAST MEDICAL HISTORY:  Past Medical History:   Diagnosis Date     Cerebral infarction (H)     Residual left hemiparesis, on Plavix     Detached retina, left      DM2 (diabetes mellitus, type 2) (H)      Hypertension      Lymphoma 2008    treated with Chemo/Rad in , recurrence 2017 tx'd with chemo       PAST SURGICAL HISTORY:  Past Surgical History:   Procedure Laterality Date     APPENDECTOMY  1949     BACK SURGERY       EYE SURGERY      cataracts       FAMILY HISTORY:  Family History   Problem Relation Age of Onset     Abdominal Aortic Aneurysm Mother          at age 99     Myocardial Infarction Father          age 65       SOCIAL HISTORY:  Social History     Socioeconomic History     Marital status:      Spouse name: None     Number of children: 3     Years of education: None     Highest education level: None   Occupational History     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   Social Needs     Financial resource strain: None     Food insecurity:     Worry: None     Inability: None     Transportation needs:     Medical: None     Non-medical: None   Tobacco Use     Smoking status: Former Smoker     Last attempt to  "quit: 1980     Years since quittin.3     Smokeless tobacco: Never Used     Tobacco comment: 1 pot of coffee per day, diet cola daily    Substance and Sexual Activity     Alcohol use: Yes     Comment: 1 every couple of weeks      Drug use: Never     Sexual activity: None   Lifestyle     Physical activity:     Days per week: None     Minutes per session: None     Stress: None   Relationships     Social connections:     Talks on phone: None     Gets together: None     Attends Pentecostal service: None     Active member of club or organization: None     Attends meetings of clubs or organizations: None     Relationship status: None     Intimate partner violence:     Fear of current or ex partner: None     Emotionally abused: None     Physically abused: None     Forced sexual activity: None   Other Topics Concern     Parent/sibling w/ CABG, MI or angioplasty before 65F 55M? Not Asked   Social History Narrative    , has 3 children, lives by himself in an apartment.  Has living will, desires DNR/DNI.  (last updated 2019)        Review of Systems:  Skin:  Negative       Eyes:  Negative      ENT:  Negative      Respiratory:  Negative       Cardiovascular:  Negative edema;Positive for    Gastroenterology: Negative      Genitourinary:  Negative      Musculoskeletal:  Negative      Neurologic:  Negative      Psychiatric:  Negative      Heme/Lymph/Imm:  Negative      Endocrine:  Positive for diabetes      Physical Exam:  Vitals: BP 91/59   Pulse 60   Ht 1.778 m (5' 10\")   Wt 84.4 kg (186 lb)   BMI 26.69 kg/m     Constitutional: awake, alert, no distress  Skin: Warm and dry to touch  Head: Normocephalic, atraumatic  Eyes: Conjunctivae and lids unremarkable, sclera white  ENT: No pallor or cyanosis  Neck: Carotid pulses are full and equal bilaterally.  Respiratory: Normal breath sounds, clear to auscultation, no use of sensory muscles, no wheezing or crepts  Cardiac: Regular rate and rhythm, S1-S2 normal.  No " murmurs gallops or rubs.  Pulses full and equal bilaterally in all 4 extremities.  No pedal edema.   Abdomen: soft and nontender.  Extremities and musculoskeletal: No gross motor deficit  Neurological.  Affect normal  Psych: Alert and oriented x3    Recent Lab Results:  LIPID RESULTS:  No results found for: CHOL, HDL, LDL, TRIG, CHOLHDLRATIO    LIVER ENZYME RESULTS:  No results found for: AST, ALT    CBC RESULTS:  Lab Results   Component Value Date    WBC 11.2 (H) 04/11/2019    RBC 4.21 (L) 04/11/2019    HGB 12.8 (L) 04/11/2019    HCT 38.4 (L) 04/11/2019    MCV 91 04/11/2019    MCH 30.4 04/11/2019    MCHC 33.3 04/11/2019    RDW 15.5 (H) 04/11/2019     04/14/2019       BMP RESULTS:  Lab Results   Component Value Date     05/02/2019    POTASSIUM 4.0 05/02/2019    CHLORIDE 98 05/02/2019    CO2 27 05/02/2019    ANIONGAP 15 05/02/2019     (H) 05/02/2019    BUN 44 (H) 05/02/2019    CR 1.56 (H) 05/02/2019    GFRESTIMATED 42 (L) 05/02/2019    GFRESTBLACK 51 (L) 05/02/2019    EJSU 10.0 05/02/2019        A1C RESULTS:  Lab Results   Component Value Date    A1C 8.3 (H) 04/11/2019       INR RESULTS:  No results found for: INR    CC  No referring provider defined for this encounter.    All medical records were reviewed in detail and discussed with the patient. Greater than 45 mins were spent with the patient, 50% of this time was spent on counseling and coordination of care.  After visit summary was printed and given to the patient.      Thank you for allowing me to participate in the care of your patient.      Sincerely,     Deep Scanlon MD     [facility]

## 2019-05-16 NOTE — TELEPHONE ENCOUNTER
Dr. Scanlon,     Mr. Mccartney was seen for a blood pressure check today with the following results:     Morning medications were taken at: morning meds @ 9:00 am,   Today's blood pressure:  118/76 mmHg  Today's heart rate:   99 bpm     Plan from the last visit on 5/2/19:   1.  BMP in 2 weeks followed by an KOMAL visit  2.  If creatinine stays elevated he will be referred to see a nephrologist  3.  If creatinine stays over 1.5 decreased the dose of Eliquis to 2.5 mg p.o. twice daily  4.  If creatinine normalizes he can be started on a standing dose of Lasix at 10 mg p.o. per day with instructions to up titrate based on lower extremity edema and symptoms    Component      Latest Ref Rng & Units 5/2/2019 5/13/2019 5/16/2019   Sodium      133 - 144 mmol/L 136 136 140   Potassium      3.4 - 5.3 mmol/L 4.0 4.4 4.6   Chloride      94 - 109 mmol/L 98 105 106   Carbon Dioxide      20 - 32 mmol/L 27 23 24   Anion Gap      3 - 14 mmol/L 15 8 10   Glucose      70 - 99 mg/dL 217 (H) 167 (H) 103 (H)   Urea Nitrogen      7 - 30 mg/dL 44 (H) 26 31 (H)   Creatinine      0.66 - 1.25 mg/dL 1.56 (H) 1.07 1.16   GFR Estimate      >60 mL/min/1.73:m2 42 (L) 62 57 (L)   GFR Estimate If Black      >60 mL/min/1.73:m2 51 (L) 72 65   Calcium      8.5 - 10.1 mg/dL 10.0 9.6 9.4     Labs today as above show improvement of creatinine. I think the KOMAL follow up visit was inadvertently ordered as nurse follow up for today's BP check. I will try to move up his currently scheduled KOMAL visit with Debra Gant NP on 6/10/19. Can we restart his lasix at 10 mg daily as planned?    Thank you!    Karthik Prado, RN

## 2019-05-17 NOTE — TELEPHONE ENCOUNTER
Deep Scanlon MD  You 28 minutes ago (12:42 PM)     Yes, can start lasix 10. Cr has decreased to 1.15      Called and spoke to Mr. Mccartney's daughter Evelina with the update on his lab results and the plan per Dr. Scanlon to have him restart Lasix 10 mg once daily given the improvement in his creatinine. She stated understanding and requested to have the script sent to the [facility] in [place]. He has been feeling well without shortness of breath or significant lower extremity edema and his weight has been stable in his daily checks at home. Confirmed the plan for labs and follow up on 6/10/19 with Debra Gant NP and encouraged them to call if his weight begins to trend up or has any concerns in the meantime. RICARDO Prado RN - 05/17/19, 1:29 PM

## 2019-06-10 NOTE — LETTER
6/10/2019    Diane Falcon NP  [facility] [address]  [facility] [address]    RE: Renato Mccartney       Dear Colleague,    I had the pleasure of seeing Renato Mccartney in the [facility].    Cardiology Clinic Progress Note  Renato Mccartney MRN# [id number]   YOB: 1932 Age: 86 year old   Primary Cardiologist: Dr. Scanlon Reason for visit: CORE followup            Assessment and Plan:   Renato Mccartney is a very pleasant 86 year old male with a history of HFpEF, atrial fibrillation (Dx 4/2019), mild aortic stenosis, mild aortic root dilation, DM type 2, HTN and CVA with residual left hemiparesis. Patient hospitalized 4/11/19-4/14/19 presenting with generalized weakness, he was found to have new onset atrial fibrillation and UTI. Patient was also noted to be in an acute diastolic heart failure exacerbation. BNP 10,436. Admission weight 215#. Discharge weight 188#. Patient here today for CORE followup       1.  Chronic diastolic heart failure/HFpEF - echo showed 4/12/19 showed LVEF 55-60%, patient appears compensated and euvolemic, HF symptoms well controlled, he has been off diuretics since clinic visit with Dr. Scanlon on 5/2/19. On 5/17/19 he was advised to resume furosemide 10mg daily, but patient and daughter note they never picked up. So he has remained off diuretics since 5/2/19, stating weights/symptoms have remained stable. Kidney function and electrolytes stable, creatinine 1.29.    - NYHA class III, stage C   - Fluid status : euvolemic   - Diuretic regimen : none, was on furosemide in the past, this was stopped 5/2/19    - Aldosterone antagonist : will consider in the future if any difficulty with symptoms (but normal RV function/size).    - Blood pressure : controlled   - Reinforced HF education, reviewed when to notify CORE clinic.    2. Atrial fibrillation - newly diagnosed in April 2019 during hospitalization, stable, rate controlled,  asymptomatic, Patient was seen by electrophysiologist Dr. Bentley while in the hospital who recommended rate control strategy, started metoprolol XL 12.5mg daily, 3 day zio monitor and to follow up with general cardiologist outpatient.     - CHADS-VASc score 6   - Zio montior showed avg heart rate was 72, min 41 and max 114, 6 pauses - longest being 2.37 seconds   - Continue metoprolol XL 12.5mg daily   - Continue apixaban 5mg BID for thromboembolic prophylaxis    - Patient reports fall 2 weeks ago, reviewed worries related to falls and anticoagulation. Patient recently completed physical therapy. Patient/daughter voice understanding of risks associated with falls while on anticoagulation. Will continue to monitor. Plan to remain on anticoagulation.     3. Mild aortic stenosis - will continue to monitor with routine echocardiogram, will check in next 6-12 months to assess for progression.     4. Mild aortic root dilation - will continue to monitor with routine echocardiogram, will check in next 6-12 months to assess for progression.      5. Hypertension - controlled, continue current medication regimen.     6. DM type II - uncontrolled, HgbA1c 8.3, counseled patient on the benefits of good glycemic control.     7. Advance care planning - DNR/DNI, need to continue to further explore patients goals of care.           Changes today: remain off furosemide    Follow up plan:     Follow up with Debra in CORE clinic in 2 months        History of Presenting Illness:    Renato Mccartney is a very pleasant 86 year old male with a history of HFpEF, atrial fibrillation (Dx 4/2019), mild aortic stenosis, mild aortic root dilation, DM type 2, HTN and CVA with residual left hemiparesis.     Patient was hospitalized 4/11/19-4/14/19 presenting with generalized weakness, he was found to have new onset atrial fibrillation and UTI. Patient was also noted to be in an acute diastolic heart failure exacerbation. BNP 10,436. CXR small right  pleural effusion. Troponin elevation, peaked at 0.145, felt to be secondary to atrial fibrillation. TSH normal. Echocardiogram completed 4/12/19 showed LVEF 55-60%, no regional wall motion abnormalities, mild aortic stenosis, and mild aortic root dilation, . Patient was seen by electrophysiologist Dr. Bentley who recommended rate control, started metoprolol XL 12.5mg daily, 3 day zio monitor and to follow up with general cardiologist outpatient. CHADS vasc score 6, aspirin/plavix stopped and eliquis started. Patient was diuresed with IV furosemide and discharged on furosemide 40mg BID. Admission weight 215#. Discharge weight 188#.     Patient is here today for CORE followup. I first met patient in April after his hospitalization for CORE enrollment. During our visit on 4/18/19 a bump in his creatinine was noted from 1-> 1.4, his furosemide was decreased to 40mg daily due to concerns of over diuresis. He noted significant improvement in his HF symptoms. Since our visit he was seen by Dr. Scanlon on 5/2/19, patient was felt to be intravascularly depleted and his furosemide was discontinued. Labs were rechecked in 1 week noting improved kidney function and patient was advised to resume 10mg furosemide daily. Patient notes today that he never started.     His daughter present for clinic visit today.     Patient reports feeling good. Monitoring weights daily at home (senior living apartment), weight has been ranging 182# - 186#, today weight at home 184#. Denies lower extremity edema, which he states is an improvement. Denies abdominal distention/bloating. Denies shortness of breath at rest. Denies exertional dyspnea. Daughter notes some exertional dyspnea but improved from the past. Patient feels he is stronger. Sleeping okay. Denies orthopnea or PND. Energy levels are good. Patient denies chest pain or chest tightness. Denies dizziness, lightheadedness or other presyncopal symptoms. Denies tachycardia or palpitations.  "Denies episodes of bleeding. Black eye today, states he hit a shelf, unable to recall the incident. Fall 2 weeks ago, states mechanical fall \"tangled up in walker\", denies any injury.     Labs today show stable kidney function and electrolytes. Blood pressure 138/76 and HR 80 in clinic today.    Appetite good. Trying to limit sodium. Not adding additional salt to foods.  5 frozen meals on wheels weekly, 8 meals per month in building and daughters bringing meals. Currently following fluid restrictions. Working with PT/OT. At home was doing a light weight lifting routine and a routine at on the bed for leg exercises. Seldom alcohol use. Denies tobacco use.         Recent Hospitalizations   19-19 presenting with generalized weakness, he was found to have new onset atrial fibrillation and UTI. Patient was also noted to be in an acute diastolic heart failure exacerbation. Discharged on furosemide 40mg BID.         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   Social Needs     Financial resource strain: Not on file     Food insecurity:     Worry: Not on file     Inability: Not on file     Transportation needs:     Medical: Not on file     Non-medical: Not on file   Tobacco Use     Smoking status: Former Smoker     Last attempt to quit: 1980     Years since quittin.4     Smokeless tobacco: Never Used     Tobacco comment: 1 pot of coffee per day, diet cola daily    Substance and Sexual Activity     Alcohol use: Yes     Comment: 1 every couple of weeks      Drug use: Never     Sexual activity: Not on file   Lifestyle     Physical activity:     Days per week: Not on file     Minutes " "per session: Not on file     Stress: Not on file   Relationships     Social connections:     Talks on phone: Not on file     Gets together: Not on file     Attends Confucianism service: Not on file     Active member of club or organization: Not on file     Attends meetings of clubs or organizations: Not on file     Relationship status: Not on file     Intimate partner violence:     Fear of current or ex partner: Not on file     Emotionally abused: Not on file     Physically abused: Not on file     Forced sexual activity: Not on file   Other Topics Concern     Parent/sibling w/ CABG, MI or angioplasty before 65F 55M? Not Asked   Social History Narrative    , has 3 children, lives by himself in an apartment.  Has living will, desires DNR/DNI.  (last updated 4/11/2019)             Review of Systems:   Skin:  Positive for bruising   Eyes:  Negative    ENT:  Negative    Respiratory:  Negative    Cardiovascular:  Negative edema;Positive for  Gastroenterology: Negative    Genitourinary:  Negative    Musculoskeletal:  Negative    Neurologic:  Negative    Psychiatric:  Positive for sleep disturbances  Heme/Lymph/Imm:  Negative    Endocrine:  Positive for diabetes         Physical Exam:   Vitals: /76   Pulse 80   Ht 1.778 m (5' 10\")   Wt 85.3 kg (188 lb)   SpO2 97%   BMI 26.98 kg/m      Wt Readings from Last 4 Encounters:   06/10/19 85.3 kg (188 lb)   05/02/19 84.4 kg (186 lb)   04/25/19 84.5 kg (186 lb 4.8 oz)   04/18/19 86.6 kg (191 lb)     GEN: well nourished, in no acute distress.  HEENT:  Pupils equal, round. Sclerae nonicteric.   NECK: Supple, no masses appreciated. No JVD appreciated on exam  C/V:  Irregularly irregular rate and rhythm, 2/6 systolic murmur  RESP: Respirations are unlabored. Clear to auscultation bilaterally without wheezing, rales, or rhonchi.  GI: Abdomen soft, nontender.  EXTREM: +1 bilateral lower extremity edema to ankles.   NEURO: Alert and oriented, cooperative.  SKIN: Warm and " dry.       Data:   ECHO 4/12/18  The left ventricle is normal in size.  The visual ejection fraction is estimated at 55-60%.  Diastolic Doppler findings (E/E' ratio and/or other parameters) suggest left  ventricular filling pressures are increased.  No regional wall motion abnormalities noted.  There is sclerosis, calcification, and restriction of the aortic valve opening  compatible with mild aortic stenosis.  Mild aortic root dilatation.  The rhythm was atrial fibrillation.  The patient exhibited occasional PVCs.    CBC RESULTS:  Lab Results   Component Value Date    WBC 11.2 (H) 04/11/2019    RBC 4.21 (L) 04/11/2019    HGB 12.8 (L) 04/11/2019    HCT 38.4 (L) 04/11/2019    MCV 91 04/11/2019    MCH 30.4 04/11/2019    MCHC 33.3 04/11/2019    RDW 15.5 (H) 04/11/2019     04/14/2019     BMP RESULTS:  Lab Results   Component Value Date     06/10/2019    POTASSIUM 4.8 06/10/2019    CHLORIDE 102 06/10/2019    CO2 25 06/10/2019    ANIONGAP 15.8 06/10/2019     (H) 06/10/2019    BUN 25 06/10/2019    CR 1.29 06/10/2019    GFRESTIMATED 53 (L) 06/10/2019    GFRESTBLACK 64 06/10/2019    JESU 10.0 06/10/2019      A1C RESULTS:  Lab Results   Component Value Date    A1C 8.3 (H) 04/11/2019            Medications     Current Outpatient Medications   Medication Sig Dispense Refill     apixaban ANTICOAGULANT (ELIQUIS) 5 MG tablet Take 1 tablet (5 mg) by mouth 2 times daily       atorvastatin (LIPITOR) 20 MG tablet Take 20 mg by mouth At Bedtime        cholecalciferol 1000 units TABS Take 2,000 Units by mouth daily        fish oil-omega-3 fatty acids 1000 MG capsule Take 1 g by mouth daily       lisinopril (PRINIVIL/ZESTRIL) 20 MG tablet Take 20 mg by mouth daily       metFORMIN (GLUCOPHAGE) 500 MG tablet Take 2 tablets (1,000 mg) by mouth 2 times daily (with meals)       metoprolol succinate ER (TOPROL-XL) 25 MG 24 hr tablet Take 0.5 tablets (12.5 mg) by mouth daily       tamsulosin (FLOMAX) 0.4 MG capsule Take 1  capsule (0.4 mg) by mouth daily            Past Medical History     Past Medical History:   Diagnosis Date     Cerebral infarction (H)     Residual left hemiparesis, on Plavix     Detached retina, left      DM2 (diabetes mellitus, type 2) (H)      Hypertension      Lymphoma 2008    treated with Chemo/Rad in , recurrence 2017 tx'd with chemo     Past Surgical History:   Procedure Laterality Date     APPENDECTOMY       BACK SURGERY       EYE SURGERY      cataracts     Family History   Problem Relation Age of Onset     Abdominal Aortic Aneurysm Mother          at age 99     Myocardial Infarction Father          age 65            Allergies   Patient has no known allergies.        CHANEL Canas CNP  [facility]  Pager: [phone]    Thank you for allowing me to participate in the care of your patient.  Sincerely,     CHANEL Canas CNP     [facility]

## 2019-06-10 NOTE — PATIENT INSTRUCTIONS
Call CORE nurse for any questions or concerns Mon-Fri 8am-4pm:                                                [phone]                                       For concerns after hours:                                                 [phone]     Medication changes: no changes, continue to remain off furosemide  Plan from today: follow up with Debra in 2 month  Lab results: see attached; stable kidney function and electrolytes

## 2019-06-10 NOTE — LETTER
6/10/2019    Diane Falcon NP  [facility] [address]  [facility] [address]    RE: Renato Mccartney       Dear Colleague,    I had the pleasure of seeing Renato Mccartney in the [facility].    Cardiology Clinic Progress Note  Renato Mccartney MRN# [id number]   YOB: 1932 Age: 86 year old   Primary Cardiologist: Dr. Scanlon Reason for visit: CORE followup            Assessment and Plan:   Renato Mccartney is a very pleasant 86 year old male with a history of HFpEF, atrial fibrillation (Dx 4/2019), mild aortic stenosis, mild aortic root dilation, DM type 2, HTN and CVA with residual left hemiparesis. Patient hospitalized 4/11/19-4/14/19 presenting with generalized weakness, he was found to have new onset atrial fibrillation and UTI. Patient was also noted to be in an acute diastolic heart failure exacerbation. BNP 10,436. Admission weight 215#. Discharge weight 188#. Patient here today for CORE followup       1.  Chronic diastolic heart failure/HFpEF - echo showed 4/12/19 showed LVEF 55-60%, patient appears compensated and euvolemic, HF symptoms well controlled, he has been off diuretics since clinic visit with Dr. Scanlon on 5/2/19. On 5/17/19 he was advised to resume furosemide 10mg daily, but patient and daughter note they never picked up. So he has remained off diuretics since 5/2/19, stating weights/symptoms have remained stable. Kidney function and electrolytes stable, creatinine 1.29.    - NYHA class III, stage C   - Fluid status : euvolemic   - Diuretic regimen : none, was on furosemide in the past, this was stopped 5/2/19    - Aldosterone antagonist : will consider in the future if any difficulty with symptoms (but normal RV function/size).    - Blood pressure : controlled   - Reinforced HF education, reviewed when to notify CORE clinic.    2. Atrial fibrillation - newly diagnosed in April 2019 during hospitalization, stable, rate controlled,  asymptomatic, Patient was seen by electrophysiologist Dr. Bentley while in the hospital who recommended rate control strategy, started metoprolol XL 12.5mg daily, 3 day zio monitor and to follow up with general cardiologist outpatient.     - CHADS-VASc score 6   - Zio montior showed avg heart rate was 72, min 41 and max 114, 6 pauses - longest being 2.37 seconds   - Continue metoprolol XL 12.5mg daily   - Continue apixaban 5mg BID for thromboembolic prophylaxis    - Patient reports fall 2 weeks ago, reviewed worries related to falls and anticoagulation. Patient recently completed physical therapy. Patient/daughter voice understanding of risks associated with falls while on anticoagulation. Will continue to monitor. Plan to remain on anticoagulation.     3. Mild aortic stenosis - will continue to monitor with routine echocardiogram, will check in next 6-12 months to assess for progression.     4. Mild aortic root dilation - will continue to monitor with routine echocardiogram, will check in next 6-12 months to assess for progression.      5. Hypertension - controlled, continue current medication regimen.     6. DM type II - uncontrolled, HgbA1c 8.3, counseled patient on the benefits of good glycemic control.     7. Advance care planning - DNR/DNI, need to continue to further explore patients goals of care.           Changes today: remain off furosemide    Follow up plan:     Follow up with Debra in CORE clinic in 2 months        History of Presenting Illness:    Renato Mccartney is a very pleasant 86 year old male with a history of HFpEF, atrial fibrillation (Dx 4/2019), mild aortic stenosis, mild aortic root dilation, DM type 2, HTN and CVA with residual left hemiparesis.     Patient was hospitalized 4/11/19-4/14/19 presenting with generalized weakness, he was found to have new onset atrial fibrillation and UTI. Patient was also noted to be in an acute diastolic heart failure exacerbation. BNP 10,436. CXR small right  pleural effusion. Troponin elevation, peaked at 0.145, felt to be secondary to atrial fibrillation. TSH normal. Echocardiogram completed 4/12/19 showed LVEF 55-60%, no regional wall motion abnormalities, mild aortic stenosis, and mild aortic root dilation, . Patient was seen by electrophysiologist Dr. Bentley who recommended rate control, started metoprolol XL 12.5mg daily, 3 day zio monitor and to follow up with general cardiologist outpatient. CHADS vasc score 6, aspirin/plavix stopped and eliquis started. Patient was diuresed with IV furosemide and discharged on furosemide 40mg BID. Admission weight 215#. Discharge weight 188#.     Patient is here today for CORE followup. I first met patient in April after his hospitalization for CORE enrollment. During our visit on 4/18/19 a bump in his creatinine was noted from 1-> 1.4, his furosemide was decreased to 40mg daily due to concerns of over diuresis. He noted significant improvement in his HF symptoms. Since our visit he was seen by Dr. Scanlon on 5/2/19, patient was felt to be intravascularly depleted and his furosemide was discontinued. Labs were rechecked in 1 week noting improved kidney function and patient was advised to resume 10mg furosemide daily. Patient notes today that he never started.     His daughter present for clinic visit today.     Patient reports feeling good. Monitoring weights daily at home (senior living apartment), weight has been ranging 182# - 186#, today weight at home 184#. Denies lower extremity edema, which he states is an improvement. Denies abdominal distention/bloating. Denies shortness of breath at rest. Denies exertional dyspnea. Daughter notes some exertional dyspnea but improved from the past. Patient feels he is stronger. Sleeping okay. Denies orthopnea or PND. Energy levels are good. Patient denies chest pain or chest tightness. Denies dizziness, lightheadedness or other presyncopal symptoms. Denies tachycardia or palpitations.  "Denies episodes of bleeding. Black eye today, states he hit a shelf, unable to recall the incident. Fall 2 weeks ago, states mechanical fall \"tangled up in walker\", denies any injury.     Labs today show stable kidney function and electrolytes. Blood pressure 138/76 and HR 80 in clinic today.    Appetite good. Trying to limit sodium. Not adding additional salt to foods.  5 frozen meals on wheels weekly, 8 meals per month in building and daughters bringing meals. Currently following fluid restrictions. Working with PT/OT. At home was doing a light weight lifting routine and a routine at on the bed for leg exercises. Seldom alcohol use. Denies tobacco use.         Recent Hospitalizations   19-19 presenting with generalized weakness, he was found to have new onset atrial fibrillation and UTI. Patient was also noted to be in an acute diastolic heart failure exacerbation. Discharged on furosemide 40mg BID.         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   Social Needs     Financial resource strain: Not on file     Food insecurity:     Worry: Not on file     Inability: Not on file     Transportation needs:     Medical: Not on file     Non-medical: Not on file   Tobacco Use     Smoking status: Former Smoker     Last attempt to quit: 1980     Years since quittin.4     Smokeless tobacco: Never Used     Tobacco comment: 1 pot of coffee per day, diet cola daily    Substance and Sexual Activity     Alcohol use: Yes     Comment: 1 every couple of weeks      Drug use: Never     Sexual activity: Not on file   Lifestyle     Physical activity:     Days per week: Not on file     Minutes " "per session: Not on file     Stress: Not on file   Relationships     Social connections:     Talks on phone: Not on file     Gets together: Not on file     Attends Baptism service: Not on file     Active member of club or organization: Not on file     Attends meetings of clubs or organizations: Not on file     Relationship status: Not on file     Intimate partner violence:     Fear of current or ex partner: Not on file     Emotionally abused: Not on file     Physically abused: Not on file     Forced sexual activity: Not on file   Other Topics Concern     Parent/sibling w/ CABG, MI or angioplasty before 65F 55M? Not Asked   Social History Narrative    , has 3 children, lives by himself in an apartment.  Has living will, desires DNR/DNI.  (last updated 4/11/2019)             Review of Systems:   Skin:  Positive for bruising   Eyes:  Negative    ENT:  Negative    Respiratory:  Negative    Cardiovascular:  Negative edema;Positive for  Gastroenterology: Negative    Genitourinary:  Negative    Musculoskeletal:  Negative    Neurologic:  Negative    Psychiatric:  Positive for sleep disturbances  Heme/Lymph/Imm:  Negative    Endocrine:  Positive for diabetes         Physical Exam:   Vitals: /76   Pulse 80   Ht 1.778 m (5' 10\")   Wt 85.3 kg (188 lb)   SpO2 97%   BMI 26.98 kg/m      Wt Readings from Last 4 Encounters:   06/10/19 85.3 kg (188 lb)   05/02/19 84.4 kg (186 lb)   04/25/19 84.5 kg (186 lb 4.8 oz)   04/18/19 86.6 kg (191 lb)     GEN: well nourished, in no acute distress.  HEENT:  Pupils equal, round. Sclerae nonicteric.   NECK: Supple, no masses appreciated. No JVD appreciated on exam  C/V:  Irregularly irregular rate and rhythm, 2/6 systolic murmur  RESP: Respirations are unlabored. Clear to auscultation bilaterally without wheezing, rales, or rhonchi.  GI: Abdomen soft, nontender.  EXTREM: +1 bilateral lower extremity edema to ankles.   NEURO: Alert and oriented, cooperative.  SKIN: Warm and " dry.       Data:   ECHO 4/12/18  The left ventricle is normal in size.  The visual ejection fraction is estimated at 55-60%.  Diastolic Doppler findings (E/E' ratio and/or other parameters) suggest left  ventricular filling pressures are increased.  No regional wall motion abnormalities noted.  There is sclerosis, calcification, and restriction of the aortic valve opening  compatible with mild aortic stenosis.  Mild aortic root dilatation.  The rhythm was atrial fibrillation.  The patient exhibited occasional PVCs.    CBC RESULTS:  Lab Results   Component Value Date    WBC 11.2 (H) 04/11/2019    RBC 4.21 (L) 04/11/2019    HGB 12.8 (L) 04/11/2019    HCT 38.4 (L) 04/11/2019    MCV 91 04/11/2019    MCH 30.4 04/11/2019    MCHC 33.3 04/11/2019    RDW 15.5 (H) 04/11/2019     04/14/2019     BMP RESULTS:  Lab Results   Component Value Date     06/10/2019    POTASSIUM 4.8 06/10/2019    CHLORIDE 102 06/10/2019    CO2 25 06/10/2019    ANIONGAP 15.8 06/10/2019     (H) 06/10/2019    BUN 25 06/10/2019    CR 1.29 06/10/2019    GFRESTIMATED 53 (L) 06/10/2019    GFRESTBLACK 64 06/10/2019    JESU 10.0 06/10/2019      A1C RESULTS:  Lab Results   Component Value Date    A1C 8.3 (H) 04/11/2019            Medications     Current Outpatient Medications   Medication Sig Dispense Refill     apixaban ANTICOAGULANT (ELIQUIS) 5 MG tablet Take 1 tablet (5 mg) by mouth 2 times daily       atorvastatin (LIPITOR) 20 MG tablet Take 20 mg by mouth At Bedtime        cholecalciferol 1000 units TABS Take 2,000 Units by mouth daily        fish oil-omega-3 fatty acids 1000 MG capsule Take 1 g by mouth daily       lisinopril (PRINIVIL/ZESTRIL) 20 MG tablet Take 20 mg by mouth daily       metFORMIN (GLUCOPHAGE) 500 MG tablet Take 2 tablets (1,000 mg) by mouth 2 times daily (with meals)       metoprolol succinate ER (TOPROL-XL) 25 MG 24 hr tablet Take 0.5 tablets (12.5 mg) by mouth daily       tamsulosin (FLOMAX) 0.4 MG capsule Take 1  capsule (0.4 mg) by mouth daily            Past Medical History     Past Medical History:   Diagnosis Date     Cerebral infarction (H)     Residual left hemiparesis, on Plavix     Detached retina, left      DM2 (diabetes mellitus, type 2) (H)      Hypertension      Lymphoma 2008    treated with Chemo/Rad in , recurrence 2017 tx'd with chemo     Past Surgical History:   Procedure Laterality Date     APPENDECTOMY       BACK SURGERY       EYE SURGERY      cataracts     Family History   Problem Relation Age of Onset     Abdominal Aortic Aneurysm Mother          at age 99     Myocardial Infarction Father          age 65            Allergies   Patient has no known allergies.        CHANEL Canas CNP  [facility]  Pager: [phone]      Thank you for allowing me to participate in the care of your patient.      Sincerely,     CHANEL Canas CNP     [facility]    cc:   CHANEL Mohan CNP  [address]  [place] [address]

## 2019-06-10 NOTE — PROGRESS NOTES
Cardiology Clinic Progress Note  Renato Mccartney MRN# [id number]   YOB: 1932 Age: 86 year old   Primary Cardiologist: Dr. Scanlon Reason for visit: CORE followup            Assessment and Plan:   Renato Mccartney is a very pleasant 86 year old male with a history of HFpEF, atrial fibrillation (Dx 4/2019), mild aortic stenosis, mild aortic root dilation, DM type 2, HTN and CVA with residual left hemiparesis. Patient hospitalized 4/11/19-4/14/19 presenting with generalized weakness, he was found to have new onset atrial fibrillation and UTI. Patient was also noted to be in an acute diastolic heart failure exacerbation. BNP 10,436. Admission weight 215#. Discharge weight 188#. Patient here today for CORE followup       1.  Chronic diastolic heart failure/HFpEF - echo showed 4/12/19 showed LVEF 55-60%, patient appears compensated and euvolemic, HF symptoms well controlled, he has been off diuretics since clinic visit with Dr. Scanlon on 5/2/19. On 5/17/19 he was advised to resume furosemide 10mg daily, but patient and daughter note they never picked up. So he has remained off diuretics since 5/2/19, stating weights/symptoms have remained stable. Kidney function and electrolytes stable, creatinine 1.29.    - NYHA class III, stage C   - Fluid status : euvolemic   - Diuretic regimen : none, was on furosemide in the past, this was stopped 5/2/19    - Aldosterone antagonist : will consider in the future if any difficulty with symptoms (but normal RV function/size).    - Blood pressure : controlled   - Reinforced HF education, reviewed when to notify CORE clinic.    2. Atrial fibrillation - newly diagnosed in April 2019 during hospitalization, stable, rate controlled, asymptomatic, Patient was seen by electrophysiologist Dr. Bentley while in the hospital who recommended rate control strategy, started metoprolol XL 12.5mg daily, 3 day zio monitor and to follow up with general cardiologist outpatient.     - CHADS-VASc  score 6   - Thom cohn showed avg heart rate was 72, min 41 and max 114, 6 pauses - longest being 2.37 seconds   - Continue metoprolol XL 12.5mg daily   - Continue apixaban 5mg BID for thromboembolic prophylaxis    - Patient reports fall 2 weeks ago, reviewed worries related to falls and anticoagulation. Patient recently completed physical therapy. Patient/daughter voice understanding of risks associated with falls while on anticoagulation. Will continue to monitor. Plan to remain on anticoagulation.     3. Mild aortic stenosis - will continue to monitor with routine echocardiogram, will check in next 6-12 months to assess for progression.     4. Mild aortic root dilation - will continue to monitor with routine echocardiogram, will check in next 6-12 months to assess for progression.      5. Hypertension - controlled, continue current medication regimen.     6. DM type II - uncontrolled, HgbA1c 8.3, counseled patient on the benefits of good glycemic control.     7. Advance care planning - DNR/DNI, need to continue to further explore patients goals of care.           Changes today: remain off furosemide    Follow up plan:     Follow up with Debra in CORE clinic in 2 months        History of Presenting Illness:    Renato Mccartney is a very pleasant 86 year old male with a history of HFpEF, atrial fibrillation (Dx 4/2019), mild aortic stenosis, mild aortic root dilation, DM type 2, HTN and CVA with residual left hemiparesis.     Patient was hospitalized 4/11/19-4/14/19 presenting with generalized weakness, he was found to have new onset atrial fibrillation and UTI. Patient was also noted to be in an acute diastolic heart failure exacerbation. BNP 10,436. CXR small right pleural effusion. Troponin elevation, peaked at 0.145, felt to be secondary to atrial fibrillation. TSH normal. Echocardiogram completed 4/12/19 showed LVEF 55-60%, no regional wall motion abnormalities, mild aortic stenosis, and mild aortic root  "dilation, . Patient was seen by electrophysiologist Dr. Bentley who recommended rate control, started metoprolol XL 12.5mg daily, 3 day zio monitor and to follow up with general cardiologist outpatient. CHADS vasc score 6, aspirin/plavix stopped and eliquis started. Patient was diuresed with IV furosemide and discharged on furosemide 40mg BID. Admission weight 215#. Discharge weight 188#.     Patient is here today for CORE followup. I first met patient in April after his hospitalization for CORE enrollment. During our visit on 4/18/19 a bump in his creatinine was noted from 1-> 1.4, his furosemide was decreased to 40mg daily due to concerns of over diuresis. He noted significant improvement in his HF symptoms. Since our visit he was seen by Dr. Scanlon on 5/2/19, patient was felt to be intravascularly depleted and his furosemide was discontinued. Labs were rechecked in 1 week noting improved kidney function and patient was advised to resume 10mg furosemide daily. Patient notes today that he never started.     His daughter present for clinic visit today.     Patient reports feeling good. Monitoring weights daily at home (senior living apartment), weight has been ranging 182# - 186#, today weight at home 184#. Denies lower extremity edema, which he states is an improvement. Denies abdominal distention/bloating. Denies shortness of breath at rest. Denies exertional dyspnea. Daughter notes some exertional dyspnea but improved from the past. Patient feels he is stronger. Sleeping okay. Denies orthopnea or PND. Energy levels are good. Patient denies chest pain or chest tightness. Denies dizziness, lightheadedness or other presyncopal symptoms. Denies tachycardia or palpitations. Denies episodes of bleeding. Black eye today, states he hit a shelf, unable to recall the incident. Fall 2 weeks ago, states mechanical fall \"tangled up in walker\", denies any injury.     Labs today show stable kidney function and electrolytes. Blood " pressure 138/76 and HR 80 in clinic today.    Appetite good. Trying to limit sodium. Not adding additional salt to foods.  5 frozen meals on wheels weekly, 8 meals per month in building and daughters bringing meals. Currently following fluid restrictions. Working with PT/OT. At home was doing a light weight lifting routine and a routine at on the bed for leg exercises. Seldom alcohol use. Denies tobacco use.         Recent Hospitalizations   19-19 presenting with generalized weakness, he was found to have new onset atrial fibrillation and UTI. Patient was also noted to be in an acute diastolic heart failure exacerbation. Discharged on furosemide 40mg BID.         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   Social Needs     Financial resource strain: Not on file     Food insecurity:     Worry: Not on file     Inability: Not on file     Transportation needs:     Medical: Not on file     Non-medical: Not on file   Tobacco Use     Smoking status: Former Smoker     Last attempt to quit: 1980     Years since quittin.4     Smokeless tobacco: Never Used     Tobacco comment: 1 pot of coffee per day, diet cola daily    Substance and Sexual Activity     Alcohol use: Yes     Comment: 1 every couple of weeks      Drug use: Never     Sexual activity: Not on file   Lifestyle     Physical activity:     Days per week: Not on file     Minutes per session: Not on file     Stress: Not on file   Relationships     Social connections:     Talks on phone: Not on file     Gets together: Not on file     Attends [employer] service: Not on file     Active member of club or organization: Not on file      "Attends meetings of clubs or organizations: Not on file     Relationship status: Not on file     Intimate partner violence:     Fear of current or ex partner: Not on file     Emotionally abused: Not on file     Physically abused: Not on file     Forced sexual activity: Not on file   Other Topics Concern     Parent/sibling w/ CABG, MI or angioplasty before 65F 55M? Not Asked   Social History Narrative    , has 3 children, lives by himself in an apartment.  Has living will, desires DNR/DNI.  (last updated 4/11/2019)             Review of Systems:   Skin:  Positive for bruising   Eyes:  Negative    ENT:  Negative    Respiratory:  Negative    Cardiovascular:  Negative edema;Positive for  Gastroenterology: Negative    Genitourinary:  Negative    Musculoskeletal:  Negative    Neurologic:  Negative    Psychiatric:  Positive for sleep disturbances  Heme/Lymph/Imm:  Negative    Endocrine:  Positive for diabetes         Physical Exam:   Vitals: /76   Pulse 80   Ht 1.778 m (5' 10\")   Wt 85.3 kg (188 lb)   SpO2 97%   BMI 26.98 kg/m     Wt Readings from Last 4 Encounters:   06/10/19 85.3 kg (188 lb)   05/02/19 84.4 kg (186 lb)   04/25/19 84.5 kg (186 lb 4.8 oz)   04/18/19 86.6 kg (191 lb)     GEN: well nourished, in no acute distress.  HEENT:  Pupils equal, round. Sclerae nonicteric.   NECK: Supple, no masses appreciated. No JVD appreciated on exam  C/V:  Irregularly irregular rate and rhythm, 2/6 systolic murmur  RESP: Respirations are unlabored. Clear to auscultation bilaterally without wheezing, rales, or rhonchi.  GI: Abdomen soft, nontender.  EXTREM: +1 bilateral lower extremity edema to ankles.   NEURO: Alert and oriented, cooperative.  SKIN: Warm and dry.       Data:   ECHO 4/12/18  The left ventricle is normal in size.  The visual ejection fraction is estimated at 55-60%.  Diastolic Doppler findings (E/E' ratio and/or other parameters) suggest left  ventricular filling pressures are increased.  No " regional wall motion abnormalities noted.  There is sclerosis, calcification, and restriction of the aortic valve opening  compatible with mild aortic stenosis.  Mild aortic root dilatation.  The rhythm was atrial fibrillation.  The patient exhibited occasional PVCs.    CBC RESULTS:  Lab Results   Component Value Date    WBC 11.2 (H) 04/11/2019    RBC 4.21 (L) 04/11/2019    HGB 12.8 (L) 04/11/2019    HCT 38.4 (L) 04/11/2019    MCV 91 04/11/2019    MCH 30.4 04/11/2019    MCHC 33.3 04/11/2019    RDW 15.5 (H) 04/11/2019     04/14/2019     BMP RESULTS:  Lab Results   Component Value Date     06/10/2019    POTASSIUM 4.8 06/10/2019    CHLORIDE 102 06/10/2019    CO2 25 06/10/2019    ANIONGAP 15.8 06/10/2019     (H) 06/10/2019    BUN 25 06/10/2019    CR 1.29 06/10/2019    GFRESTIMATED 53 (L) 06/10/2019    GFRESTBLACK 64 06/10/2019    JESU 10.0 06/10/2019      A1C RESULTS:  Lab Results   Component Value Date    A1C 8.3 (H) 04/11/2019            Medications     Current Outpatient Medications   Medication Sig Dispense Refill     apixaban ANTICOAGULANT (ELIQUIS) 5 MG tablet Take 1 tablet (5 mg) by mouth 2 times daily       atorvastatin (LIPITOR) 20 MG tablet Take 20 mg by mouth At Bedtime        cholecalciferol 1000 units TABS Take 2,000 Units by mouth daily        fish oil-omega-3 fatty acids 1000 MG capsule Take 1 g by mouth daily       lisinopril (PRINIVIL/ZESTRIL) 20 MG tablet Take 20 mg by mouth daily       metFORMIN (GLUCOPHAGE) 500 MG tablet Take 2 tablets (1,000 mg) by mouth 2 times daily (with meals)       metoprolol succinate ER (TOPROL-XL) 25 MG 24 hr tablet Take 0.5 tablets (12.5 mg) by mouth daily       tamsulosin (FLOMAX) 0.4 MG capsule Take 1 capsule (0.4 mg) by mouth daily            Past Medical History     Past Medical History:   Diagnosis Date     Cerebral infarction (H) 2004    Residual left hemiparesis, on Plavix     Detached retina, left      DM2 (diabetes mellitus, type 2) (H) 2000      Hypertension      Lymphoma 2008    treated with Chemo/Rad in 2008, recurrence 2017 tx'd with chemo     Past Surgical History:   Procedure Laterality Date     APPENDECTOMY  1949     BACK SURGERY       EYE SURGERY      cataracts     Family History   Problem Relation Age of Onset     Abdominal Aortic Aneurysm Mother          at age 99     Myocardial Infarction Father          age 65            Allergies   Patient has no known allergies.        CHANEL Canas [facility]  Pager: [phone]

## 2019-06-26 NOTE — PROGRESS NOTES
"Can you please get more information on the falls? Were they mechanical?     Patient reported 2 falls during clinic visit on 6/10/19, falls appeared to be mechanical, patient reporting \"tangled up in walker\", denies any injury.     Will need to consider stopping anticoagulation given recurrent falls, risks outweighing benefits.     If mechanical falls, patient needs to be evaluated by PCP related to recurrent falls.   "

## 2019-06-26 NOTE — PROGRESS NOTES
Pt's daughter left message stating pt has had 3 falls in the past 2 weeks. She was told to report to Debra if pt has more falls as he is on eliquis 5 mg BID. Will update Debra. Jeanette NGUYEN June 26, 2019, 2:33 PM         progress slowly

## 2019-06-27 NOTE — PROGRESS NOTES
Yes, patient should resume his ASA/plavix but should also be reviewed by PCP.    Patient would also be a good candidate for watchman consideration. Unclear if patient would want to undergo additional procedures. Patient already notes his code status to be DNR/DNI.

## 2019-06-27 NOTE — PROGRESS NOTES
I called pt's daughter, Evelina, and reviewed Debra's response regarding the falls and being on an anticoagulant. Pt's daughter said there is a PA who evaluates pt every month at his assisted living. She will contact her to schedule an more immediate appointment. She will have pt hold his eliquis and understands there is an increased stroke risk. She already discussed that pt with last night and he was indifferent. She will discuss further with PCP, and call us with an update. Will send update to Debra. It looks like pt was on plavix and ASA previously for hx of stroke. Will message Debra to see if she thinks pt needs to resume either medication if not on the eliquis. Jeanette NGUYEN June 27, 2019, 2:46 PM

## 2019-06-27 NOTE — PROGRESS NOTES
I called pt's daughter back and let her know pt would need to resume the plavix and ASA if he stops his eliquis. I also briefly discussed the watchmen option and told her pt would need to come for OV to discuss further with  or  if he is interested. She said she thinks she will have pt remain on eliquis and will discuss further with PCP. Jeanette NGUYEN June 27, 2019, 3:42 PM

## 2019-06-27 NOTE — PROGRESS NOTES
Due to recurrent what appears to be mechanical falls patient needs to follow up with his PCP as soon as possible.     Regarding his anticoagulation. We reviewed the risks vs. Benefits of anticoagulation in setting of recurrent falls. During my visit on 6/10/19 we reviewed my concerns that the risks maybe beginning to outweigh the benefits related to recurrent falls. [id number] at least 6, high risk, placing his yearly risk of stroke at 9.8%. HAS-BLED at least 3, high risk, placing his 1 year estimated risk of bleeding at 4.9-19.6%. At this point given his recurrent falls it is reasonable to consider stopping anticoagulation as risks of bleeding are starting to outweigh benefits. This was reviewed with patient and wife during clinic visit, at that time plan was to continue monitoring and I advised followup with PCP to further evaluation recurrent falls.    CORE - please call patient and advise on the importance of urgent PCP follow up. You can again review the above risk vs. Benefits, recommend further discussion with PCP at f/u appointment. If they wish they can hold anticoagulation until f/u with PCP.     CHANEL Canas CNP

## 2019-06-27 NOTE — PROGRESS NOTES
I called pt's daughter to get more information about the falls. She said the falls have been mechanical, either tripping or slipping. Pt had another fall last night as he was sliding out of bed per pt's daughter. Pt called [facility] for lift assist. The paramedics told pt he needs to get an appt with Debra. Pt had a fall last week in which he hit the back of his head and scraped his back. Pt has not had any vision changes, confusion, headaches, etc since hitting his head. The cut on his back did bleed for awhile. Pt's daughter said his weights have been stable, and he has not had any SOB. Will update Debra and call pt's daughter back with recommendations. Jeanette NGUYEN June 27, 2019, 9:56 AM

## 2019-08-12 NOTE — PROGRESS NOTES
Cardiology Clinic Progress Note  Renato Mccartney MRN# [id number]   YOB: 1932 Age: 86 year old   Primary Cardiologist: Dr. Scanlon Reason for visit: CORE followup            Assessment and Plan:   Renato Mccartney is a very pleasant 86 year old male with a history of HFpEF, atrial fibrillation (Dx 4/2019), mild aortic stenosis, mild aortic root dilation, DM type 2, HTN and CVA with residual left hemiparesis. Patient hospitalized 4/11/19-4/14/19 presenting with generalized weakness, he was found to have new onset atrial fibrillation and UTI. Patient was also noted to be in an acute diastolic heart failure exacerbation. BNP 10,436. Admission weight 215#. Discharge weight 188#. Patient here today for CORE followup       1.  Chronic diastolic heart failure/HFpEF - echo showed 4/12/19 showed LVEF 55-60%, patient appears compensated and euvolemic, HF symptoms well controlled, during last CORE visit patient had been off furosemide since 5/2/19, patient notes that he resumed 20mg every other day 2 weeks ago, unclear why he restarted, states he was prescribed to start it but didn't start until now. Weights are stable. Kidney function and electrolytes stable, creatinine 1.29.    - NYHA class III, stage C   - Fluid status : euvolemic   - Diuretic regimen : continue furosemide 20mg every other day   - Aldosterone antagonist : will consider in the future if any difficulty with symptoms (but normal RV function/size).    - Blood pressure : controlled   - Reinforced HF education, reviewed when to notify CORE clinic.    2. Atrial fibrillation - newly diagnosed in April 2019 during hospitalization, stable, rate controlled, asymptomatic, Patient was seen by electrophysiologist Dr. Bentley while in the hospital who recommended rate control strategy, started metoprolol XL 12.5mg daily, 3 day zio monitor and to follow up with general cardiologist outpatient.     - CHADS-VASc score 6   - Zio maricruzior showed avg heart rate was 72,  min 41 and max 114, 6 pauses - longest being 2.37 seconds   - Continue metoprolol XL 12.5mg daily   - Continue apixaban 5mg BID for thromboembolic prophylaxis    - Patient reports a fall 2 weeks ago, also phone encounter related to multiple falls, again reviewed my worries related to falls and anticoagulation. Patient recently completed physical therapy. Patient/daughter voice understanding of risks associated with falls while on anticoagulation. Will continue to monitor. They wish to remain on anticoagulation.     3. Mild aortic stenosis - will continue to monitor with routine echocardiogram, will check in next 6-12 months to assess for progression.     4. Mild aortic root dilation - will continue to monitor with routine echocardiogram, will check in next 6-12 months to assess for progression.      5. Hypertension - controlled, continue current medication regimen.     6. DM type II - uncontrolled, HgbA1c 8.3 4/2019, counseled patient on the benefits of good glycemic control.     7. Advance care planning - DNR/DNI, need to continue to further explore patients goals of care.           Changes today: none    Follow up plan:     Follow up with Debra in CORE clinic in 4 months        History of Presenting Illness:    Renato Mccartney is a very pleasant 86 year old male with a history of HFpEF, atrial fibrillation (Dx 4/2019), mild aortic stenosis, mild aortic root dilation, DM type 2, HTN and CVA with residual left hemiparesis.     Patient was hospitalized 4/11/19-4/14/19 presenting with generalized weakness, he was found to have new onset atrial fibrillation and UTI. Patient was also noted to be in an acute diastolic heart failure exacerbation. BNP 10,436. CXR small right pleural effusion. Troponin elevation, peaked at 0.145, felt to be secondary to atrial fibrillation. TSH normal. Echocardiogram completed 4/12/19 showed LVEF 55-60%, no regional wall motion abnormalities, mild aortic stenosis, and mild aortic root  "dilation, . Patient was seen by electrophysiologist Dr. Bentley who recommended rate control, started metoprolol XL 12.5mg daily, 3 day zio monitor and to follow up with general cardiologist outpatient. CHADS vasc score 6, aspirin/plavix stopped and eliquis started. Patient was diuresed with IV furosemide and discharged on furosemide 40mg BID. Admission weight 215#. Discharge weight 188#.     Patient is here today for CORE followup. I first met patient in April after his hospitalization for CORE enrollment. During our visit on 4/18/19 a bump in his creatinine was noted from 1-> 1.4, his furosemide was decreased to 40mg daily due to concerns of over diuresis. He noted significant improvement in his HF symptoms. During our last CORE visit patient never restarted furosemide 10mg as recommended, he appeared compensated and euvolemic. Advised to remain off diuretics.     His daughter present for clinic visit today.     Patient reports feeling good. Monitoring weights daily at home (senior living apartment), weight has been ranging 182# - 185#, today weight at home 182.8#. States he has been taking furosemide 20mg every other day a couple weeks ago, states it was prescribed and then never started but then decided to start taking. Denies lower extremity edema. Denies abdominal distention/bloating. Denies shortness of breath at rest. Denies exertional dyspnea. Able to complete ADLs independently, needs some assistance with showering. Sleeping okay. Denies orthopnea or PND. Energy levels are good. Patient denies chest pain or chest tightness. Denies dizziness, lightheadedness or other presyncopal symptoms. Denies tachycardia or palpitations. Denies episodes of bleeding. Patient notes fall 2 weeks ago, again \"got tangled up in walker\".  Noted some rib pain after the fall. Patient reports a fall during most CORE visit. Taking medications daily.     Labs today show stable kidney function and electrolytes. Blood pressure 118/68 " and HR 62 in clinic today.    Appetite good. Trying to limit sodium. Not adding additional salt to foods.  5 frozen meals on wheels weekly, 8 meals per month in building and daughters bringing meals. Currently following fluid restrictions. Has a set of standing and sitting exercises that he does daily. Seldom alcohol use. Denies tobacco use.         Recent Hospitalizations   19-19 presenting with generalized weakness, he was found to have new onset atrial fibrillation and UTI. Patient was also noted to be in an acute diastolic heart failure exacerbation. Discharged on furosemide 40mg BID.         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   Social Needs     Financial resource strain: Not on file     Food insecurity:     Worry: Not on file     Inability: Not on file     Transportation needs:     Medical: Not on file     Non-medical: Not on file   Tobacco Use     Smoking status: Former Smoker     Last attempt to quit: 1980     Years since quittin.6     Smokeless tobacco: Never Used     Tobacco comment: 1 pot of coffee per day, diet cola daily    Substance and Sexual Activity     Alcohol use: Yes     Comment: 1 every couple of weeks      Drug use: Never     Sexual activity: Not on file   Lifestyle     Physical activity:     Days per week: Not on file     Minutes per session: Not on file     Stress: Not on file   Relationships     Social connections:     Talks on phone: Not on file     Gets together: Not on file     Attends Jain service: Not on file     Active member of club or organization: Not on file     Attends meetings of clubs or organizations: Not on file      "Relationship status: Not on file     Intimate partner violence:     Fear of current or ex partner: Not on file     Emotionally abused: Not on file     Physically abused: Not on file     Forced sexual activity: Not on file   Other Topics Concern     Parent/sibling w/ CABG, MI or angioplasty before 65F 55M? Not Asked   Social History Narrative    , has 3 children, lives by himself in an apartment.  Has living will, desires DNR/DNI.  (last updated 4/11/2019)             Review of Systems:   Skin:  Positive for     Eyes:  Negative    ENT:  Negative    Respiratory:  Negative    Cardiovascular:  Negative    Gastroenterology: Negative    Genitourinary:  Negative    Musculoskeletal:  Negative    Neurologic:       Psychiatric:  Positive for sleep disturbances  Heme/Lymph/Imm:  Negative    Endocrine:  Positive for diabetes         Physical Exam:   Vitals: BP (!) 146/64   Pulse 62   Ht 1.778 m (5' 10\")   Wt 83.1 kg (183 lb 4.8 oz)   SpO2 98%   BMI 26.30 kg/m     Wt Readings from Last 4 Encounters:   08/12/19 83.1 kg (183 lb 4.8 oz)   06/10/19 85.3 kg (188 lb)   05/02/19 84.4 kg (186 lb)   04/25/19 84.5 kg (186 lb 4.8 oz)     GEN: well nourished, in no acute distress.  HEENT:  Pupils equal, round. Sclerae nonicteric.   NECK: Supple, no masses appreciated. No JVD appreciated on exam  C/V:  Irregularly irregular rate and rhythm, 2/6 systolic murmur  RESP: Respirations are unlabored. Clear to auscultation bilaterally without wheezing, rales, or rhonchi.  GI: Abdomen soft, nontender.  EXTREM: No lower extremity   NEURO: Alert and oriented, cooperative.  SKIN: Warm and dry.       Data:   ECHO 4/12/18  The left ventricle is normal in size.  The visual ejection fraction is estimated at 55-60%.  Diastolic Doppler findings (E/E' ratio and/or other parameters) suggest left  ventricular filling pressures are increased.  No regional wall motion abnormalities noted.  There is sclerosis, calcification, and restriction of the " aortic valve opening  compatible with mild aortic stenosis.  Mild aortic root dilatation.  The rhythm was atrial fibrillation.  The patient exhibited occasional PVCs.    CBC RESULTS:  Lab Results   Component Value Date    WBC 11.2 (H) 04/11/2019    RBC 4.21 (L) 04/11/2019    HGB 12.8 (L) 04/11/2019    HCT 38.4 (L) 04/11/2019    MCV 91 04/11/2019    MCH 30.4 04/11/2019    MCHC 33.3 04/11/2019    RDW 15.5 (H) 04/11/2019     04/14/2019     BMP RESULTS:  Lab Results   Component Value Date     08/12/2019    POTASSIUM 4.4 08/12/2019    CHLORIDE 102 08/12/2019    CO2 27 08/12/2019    ANIONGAP 14.4 08/12/2019     (H) 08/12/2019    BUN 24 08/12/2019    CR 1.29 08/12/2019    GFRESTIMATED 53 (L) 08/12/2019    GFRESTBLACK 64 08/12/2019    JESU 10.0 08/12/2019      A1C RESULTS:  Lab Results   Component Value Date    A1C 8.3 (H) 04/11/2019            Medications     Current Outpatient Medications   Medication Sig Dispense Refill     apixaban ANTICOAGULANT (ELIQUIS) 5 MG tablet Take 1 tablet (5 mg) by mouth 2 times daily       atorvastatin (LIPITOR) 20 MG tablet Take 20 mg by mouth At Bedtime        cholecalciferol 1000 units TABS Take 2,000 Units by mouth daily        fish oil-omega-3 fatty acids 1000 MG capsule Take 1 g by mouth daily       furosemide (LASIX) 20 MG tablet Take 20 mg by mouth       lisinopril (PRINIVIL/ZESTRIL) 20 MG tablet Take 20 mg by mouth daily       metFORMIN (GLUCOPHAGE) 500 MG tablet Take 2 tablets (1,000 mg) by mouth 2 times daily (with meals)       metoprolol succinate ER (TOPROL-XL) 25 MG 24 hr tablet Take 0.5 tablets (12.5 mg) by mouth daily       tamsulosin (FLOMAX) 0.4 MG capsule Take 1 capsule (0.4 mg) by mouth daily            Past Medical History     Past Medical History:   Diagnosis Date     Cerebral infarction (H) 2004    Residual left hemiparesis, on Plavix     Detached retina, left      DM2 (diabetes mellitus, type 2) (H) 2000     Hypertension      Lymphoma 2008    treated  with Chemo/Rad in 2008, recurrence 2017 tx'd with chemo     Past Surgical History:   Procedure Laterality Date     APPENDECTOMY  1949     BACK SURGERY       EYE SURGERY      cataracts     Family History   Problem Relation Age of Onset     Abdominal Aortic Aneurysm Mother          at age 99     Myocardial Infarction Father          age 65            Allergies   Patient has no known allergies.        CHANEL Canas [facility]  Pager: [phone]

## 2019-08-12 NOTE — LETTER
8/12/2019    Diane Falcon NP  [employer] [address]  [facility] [address]    RE: Renato Mccartney       Dear Colleague,    I had the pleasure of seeing Renato Mccartney in the [facility].    Cardiology Clinic Progress Note  Renato Mccartney MRN# [id number]   YOB: 1932 Age: 86 year old   Primary Cardiologist: Dr. Scanlon Reason for visit: CORE followup            Assessment and Plan:   Renato Mccartney is a very pleasant 86 year old male with a history of HFpEF, atrial fibrillation (Dx 4/2019), mild aortic stenosis, mild aortic root dilation, DM type 2, HTN and CVA with residual left hemiparesis. Patient hospitalized 4/11/19-4/14/19 presenting with generalized weakness, he was found to have new onset atrial fibrillation and UTI. Patient was also noted to be in an acute diastolic heart failure exacerbation. BNP 10,436. Admission weight 215#. Discharge weight 188#. Patient here today for CORE followup       1.  Chronic diastolic heart failure/HFpEF - echo showed 4/12/19 showed LVEF 55-60%, patient appears compensated and euvolemic, HF symptoms well controlled, during last CORE visit patient had been off furosemide since 5/2/19, patient notes that he resumed 20mg every other day 2 weeks ago, unclear why he restarted, states he was prescribed to start it but didn't start until now. Weights are stable. Kidney function and electrolytes stable, creatinine 1.29.    - NYHA class III, stage C   - Fluid status : euvolemic   - Diuretic regimen : continue furosemide 20mg every other day   - Aldosterone antagonist : will consider in the future if any difficulty with symptoms (but normal RV function/size).    - Blood pressure : controlled   - Reinforced HF education, reviewed when to notify CORE clinic.    2. Atrial fibrillation - newly diagnosed in April 2019 during hospitalization, stable, rate controlled, asymptomatic, Patient was seen by electrophysiologist Dr. Bentley  while in the hospital who recommended rate control strategy, started metoprolol XL 12.5mg daily, 3 day zio monitor and to follow up with general cardiologist outpatient.     - CHADS-VASc score 6   - Zio montior showed avg heart rate was 72, min 41 and max 114, 6 pauses - longest being 2.37 seconds   - Continue metoprolol XL 12.5mg daily   - Continue apixaban 5mg BID for thromboembolic prophylaxis    - Patient reports a fall 2 weeks ago, also phone encounter related to multiple falls, again reviewed my worries related to falls and anticoagulation. Patient recently completed physical therapy. Patient/daughter voice understanding of risks associated with falls while on anticoagulation. Will continue to monitor. They wish to remain on anticoagulation.     3. Mild aortic stenosis - will continue to monitor with routine echocardiogram, will check in next 6-12 months to assess for progression.     4. Mild aortic root dilation - will continue to monitor with routine echocardiogram, will check in next 6-12 months to assess for progression.      5. Hypertension - controlled, continue current medication regimen.     6. DM type II - uncontrolled, HgbA1c 8.3 4/2019, counseled patient on the benefits of good glycemic control.     7. Advance care planning - DNR/DNI, need to continue to further explore patients goals of care.           Changes today: none    Follow up plan:     Follow up with Debra in CORE clinic in 4 months        History of Presenting Illness:    Renato Mccartney is a very pleasant 86 year old male with a history of HFpEF, atrial fibrillation (Dx 4/2019), mild aortic stenosis, mild aortic root dilation, DM type 2, HTN and CVA with residual left hemiparesis.     Patient was hospitalized 4/11/19-4/14/19 presenting with generalized weakness, he was found to have new onset atrial fibrillation and UTI. Patient was also noted to be in an acute diastolic heart failure exacerbation. BNP 10,436. CXR small right pleural  effusion. Troponin elevation, peaked at 0.145, felt to be secondary to atrial fibrillation. TSH normal. Echocardiogram completed 4/12/19 showed LVEF 55-60%, no regional wall motion abnormalities, mild aortic stenosis, and mild aortic root dilation, . Patient was seen by electrophysiologist Dr. Bentley who recommended rate control, started metoprolol XL 12.5mg daily, 3 day zio monitor and to follow up with general cardiologist outpatient. CHADS vasc score 6, aspirin/plavix stopped and eliquis started. Patient was diuresed with IV furosemide and discharged on furosemide 40mg BID. Admission weight 215#. Discharge weight 188#.     Patient is here today for CORE followup. I first met patient in April after his hospitalization for CORE enrollment. During our visit on 4/18/19 a bump in his creatinine was noted from 1-> 1.4, his furosemide was decreased to 40mg daily due to concerns of over diuresis. He noted significant improvement in his HF symptoms. During our last CORE visit patient never restarted furosemide 10mg as recommended, he appeared compensated and euvolemic. Advised to remain off diuretics.     His daughter present for clinic visit today.     Patient reports feeling good. Monitoring weights daily at home (senior living apartment), weight has been ranging 182# - 185#, today weight at home 182.8#. States he has been taking furosemide 20mg every other day a couple weeks ago, states it was prescribed and then never started but then decided to start taking. Denies lower extremity edema. Denies abdominal distention/bloating. Denies shortness of breath at rest. Denies exertional dyspnea. Able to complete ADLs independently, needs some assistance with showering. Sleeping okay. Denies orthopnea or PND. Energy levels are good. Patient denies chest pain or chest tightness. Denies dizziness, lightheadedness or other presyncopal symptoms. Denies tachycardia or palpitations. Denies episodes of bleeding. Patient notes fall 2  "weeks ago, again \"got tangled up in walker\".  Noted some rib pain after the fall. Patient reports a fall during most CORE visit. Taking medications daily.     Labs today show stable kidney function and electrolytes. Blood pressure 118/68 and HR 62 in clinic today.    Appetite good. Trying to limit sodium. Not adding additional salt to foods.  5 frozen meals on wheels weekly, 8 meals per month in building and daughters bringing meals. Currently following fluid restrictions. Has a set of standing and sitting exercises that he does daily. Seldom alcohol use. Denies tobacco use.         Recent Hospitalizations   19-19 presenting with generalized weakness, he was found to have new onset atrial fibrillation and UTI. Patient was also noted to be in an acute diastolic heart failure exacerbation. Discharged on furosemide 40mg BID.         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   Social Needs     Financial resource strain: Not on file     Food insecurity:     Worry: Not on file     Inability: Not on file     Transportation needs:     Medical: Not on file     Non-medical: Not on file   Tobacco Use     Smoking status: Former Smoker     Last attempt to quit: 1980     Years since quittin.6     Smokeless tobacco: Never Used     Tobacco comment: 1 pot of coffee per day, diet cola daily    Substance and Sexual Activity     Alcohol use: Yes     Comment: 1 every couple of weeks      Drug use: Never     Sexual activity: Not on file   Lifestyle     Physical activity:     Days per week: Not on file     Minutes per session: Not on file     Stress: Not on file   Relationships     Social " "connections:     Talks on phone: Not on file     Gets together: Not on file     Attends Latter day service: Not on file     Active member of club or organization: Not on file     Attends meetings of clubs or organizations: Not on file     Relationship status: Not on file     Intimate partner violence:     Fear of current or ex partner: Not on file     Emotionally abused: Not on file     Physically abused: Not on file     Forced sexual activity: Not on file   Other Topics Concern     Parent/sibling w/ CABG, MI or angioplasty before 65F 55M? Not Asked   Social History Narrative    , has 3 children, lives by himself in an apartment.  Has living will, desires DNR/DNI.  (last updated 4/11/2019)             Review of Systems:   Skin:  Positive for     Eyes:  Negative    ENT:  Negative    Respiratory:  Negative    Cardiovascular:  Negative    Gastroenterology: Negative    Genitourinary:  Negative    Musculoskeletal:  Negative    Neurologic:       Psychiatric:  Positive for sleep disturbances  Heme/Lymph/Imm:  Negative    Endocrine:  Positive for diabetes         Physical Exam:   Vitals: BP (!) 146/64   Pulse 62   Ht 1.778 m (5' 10\")   Wt 83.1 kg (183 lb 4.8 oz)   SpO2 98%   BMI 26.30 kg/m      Wt Readings from Last 4 Encounters:   08/12/19 83.1 kg (183 lb 4.8 oz)   06/10/19 85.3 kg (188 lb)   05/02/19 84.4 kg (186 lb)   04/25/19 84.5 kg (186 lb 4.8 oz)     GEN: well nourished, in no acute distress.  HEENT:  Pupils equal, round. Sclerae nonicteric.   NECK: Supple, no masses appreciated. No JVD appreciated on exam  C/V:  Irregularly irregular rate and rhythm, 2/6 systolic murmur  RESP: Respirations are unlabored. Clear to auscultation bilaterally without wheezing, rales, or rhonchi.  GI: Abdomen soft, nontender.  EXTREM: No lower extremity   NEURO: Alert and oriented, cooperative.  SKIN: Warm and dry.       Data:   ECHO 4/12/18  The left ventricle is normal in size.  The visual ejection fraction is estimated at " 55-60%.  Diastolic Doppler findings (E/E' ratio and/or other parameters) suggest left  ventricular filling pressures are increased.  No regional wall motion abnormalities noted.  There is sclerosis, calcification, and restriction of the aortic valve opening  compatible with mild aortic stenosis.  Mild aortic root dilatation.  The rhythm was atrial fibrillation.  The patient exhibited occasional PVCs.    CBC RESULTS:  Lab Results   Component Value Date    WBC 11.2 (H) 04/11/2019    RBC 4.21 (L) 04/11/2019    HGB 12.8 (L) 04/11/2019    HCT 38.4 (L) 04/11/2019    MCV 91 04/11/2019    MCH 30.4 04/11/2019    MCHC 33.3 04/11/2019    RDW 15.5 (H) 04/11/2019     04/14/2019     BMP RESULTS:  Lab Results   Component Value Date     08/12/2019    POTASSIUM 4.4 08/12/2019    CHLORIDE 102 08/12/2019    CO2 27 08/12/2019    ANIONGAP 14.4 08/12/2019     (H) 08/12/2019    BUN 24 08/12/2019    CR 1.29 08/12/2019    GFRESTIMATED 53 (L) 08/12/2019    GFRESTBLACK 64 08/12/2019    JESU 10.0 08/12/2019      A1C RESULTS:  Lab Results   Component Value Date    A1C 8.3 (H) 04/11/2019            Medications     Current Outpatient Medications   Medication Sig Dispense Refill     apixaban ANTICOAGULANT (ELIQUIS) 5 MG tablet Take 1 tablet (5 mg) by mouth 2 times daily       atorvastatin (LIPITOR) 20 MG tablet Take 20 mg by mouth At Bedtime        cholecalciferol 1000 units TABS Take 2,000 Units by mouth daily        fish oil-omega-3 fatty acids 1000 MG capsule Take 1 g by mouth daily       furosemide (LASIX) 20 MG tablet Take 20 mg by mouth       lisinopril (PRINIVIL/ZESTRIL) 20 MG tablet Take 20 mg by mouth daily       metFORMIN (GLUCOPHAGE) 500 MG tablet Take 2 tablets (1,000 mg) by mouth 2 times daily (with meals)       metoprolol succinate ER (TOPROL-XL) 25 MG 24 hr tablet Take 0.5 tablets (12.5 mg) by mouth daily       tamsulosin (FLOMAX) 0.4 MG capsule Take 1 capsule (0.4 mg) by mouth daily            Past Medical  History     Past Medical History:   Diagnosis Date     Cerebral infarction (H)     Residual left hemiparesis, on Plavix     Detached retina, left      DM2 (diabetes mellitus, type 2) (H)      Hypertension      Lymphoma 2008    treated with Chemo/Rad in , recurrence 2017 tx'd with chemo     Past Surgical History:   Procedure Laterality Date     APPENDECTOMY       BACK SURGERY       EYE SURGERY      cataracts     Family History   Problem Relation Age of Onset     Abdominal Aortic Aneurysm Mother          at age 99     Myocardial Infarction Father          age 65            Allergies   Patient has no known allergies.        CHANEL Canas CNP  [facility]  Pager: [phone]      Thank you for allowing me to participate in the care of your patient.      Sincerely,     CHANEL Canas CNP     [facility]

## 2019-08-12 NOTE — PATIENT INSTRUCTIONS
Call CORE nurse for any questions or concerns Mon-Fri 8am-4pm:                    #[phone]                For concerns after hours:                    #[phone]     1: Medication changes: none  2: Plan from today: follow up with Debra in 4 months  3: Lab results: see attached; stable kidney function and electrolytes.   Component      Latest Ref Rng & Units 6/10/2019 8/12/2019   Sodium      136 - 145 mmol/L 138 139   Potassium      3.5 - 5.1 mmol/L 4.8 4.4   Chloride      98 - 107 mmol/L 102 102   Carbon Dioxide      23 - 29 mmol/L 25 27   Anion Gap      6 - 17 mmol/L 15.8 14.4   Glucose      70 - 105 mg/dL 115 (H) 122 (H)   Urea Nitrogen      7 - 30 mg/dL 25 24   Creatinine      0.70 - 1.30 mg/dL 1.29 1.29   GFR Estimate      >60 mL/min/1.73:m2 53 (L) 53 (L)   GFR Estimate If Black      >60 mL/min/1.73:m2 64 64   Calcium      8.5 - 10.5 mg/dL 10.0 10.0

## 2019-08-13 NOTE — PROGRESS NOTES
Pt's daughter, Anne, called and left  stating that the pt's AVS he received following his OV with Debra yesterday shows a Metformin 500 mg tablet with instructions to take 2 tablets BID, but patient actually has a 1000 mg tablet and he takes 1 tablet BID.     Updated medlist to reflect Metformin 1000 mg tablets with instructions to take 1 tablet BID.    Dinah Nolasco RN on 8/13/2019 at 2:08 PM

## 2019-08-23 NOTE — PROGRESS NOTES
HPI and Plan:   Today I had the pleasure of seeing Renato Mccartney at [facility] in [place]. He is a pleasant 86 year old patient with a past medical history of heart failure with preserved ejection fraction,  paroxysmal atrial fibrillation with a chads vas score of 6, mild aortic stenosis with a mean gradient of 12.5, ischemic CVA with residual left hemiparesis, hypertension who presents to the clinic in consultation for all the above issues.      The patient was recently admitted to the hospital for exacerbation of diastolic heart failure and was treated medically with diuretics with good effect.  BNP decreased from over 10,000 at admission to 5000 at the time of discharge.  He was recently seen in core clinic on 8/12/2019 and no significant changes were made to his regimen.  His dry weight is 188 pounds.    The ongoing issue with the patient has been recent falls.  Since being seen in the clinic 2 weeks ago the patient had 2 more falls.  He got entangled in his walker and fell.  The second time he slipped on compression stocking.  He has since been using nonslip socks.  He is also more careful now and has not sustained a fall in the last 10 days.     His most recent echocardiogram is from 4/12/2019 which showed normal left ventricular size and function.  Mild to moderately dilated left atrium, mildly dilated right atrium, mild mitral regurgitation and mild aortic stenosis with a mean gradient of 12.5, mild aortic root dilation.  But he did not he also had a Holter monitor done in April 2019 which showed atrial fibrillation to be the principal rhythm.  he also had PVCs which were 5% of thetotal QRS.     Assessment plan  1.  Paroxysmal atrial fibrillation with chads vas score of 6  2.  Heart failure with preserved ejection fraction  3.  Mild aortic stenosis with a mean gradient of 12.5 mmHg  4.  Mild aortic root dilation    The patient is appropriately on anticoagulation for atrial  fibrillation.  He has been having frequent falls which is mostly mechanical in nature.  I would continue anticoagulation at this time but I discussed in great detail with the patient and his daughter the risks of sustaining falls and the risk of bleeding in the head.  I told him that he needs to be checked out emergently in the emergency room if he falls sustained a fall and hit his head.  His daughter tells me that he had his falls are not bad and that most of the time he just slumps over.  He is appropriately rate controlled I will continue AV gosia blockage for now.    His heart looks pretty well controlled and he has compression stockings on his own today he has 1+ lower extremity edema bilaterally.  He is taking Lasix every other day and I told him to increase the dose to once daily if he notices lower extremity edema or weight gain.  Albumin is normal.  I will hold off on adding any new medications today.    I will have him come back and see an KOMAL in 4-month see myself and 12 months.  He can mostly definitely come back sooner if he is not feeling well.    Thank you for allowing me to participate in the care of Renato Scalnon MD  Cardiology    Orders Placed This Encounter   Procedures     Follow-Up with Cardiologist     Follow-Up with Cardiac Advanced Practice Provider       No orders of the defined types were placed in this encounter.      There are no discontinued medications.    Encounter Diagnoses   Name Primary?     Persistent atrial fibrillation (H)      Nonrheumatic aortic valve stenosis Yes     Heart failure with preserved ejection fraction, unspecified HF chronicity (H)        CURRENT MEDICATIONS:  Current Outpatient Medications   Medication Sig Dispense Refill     apixaban ANTICOAGULANT (ELIQUIS) 5 MG tablet Take 1 tablet (5 mg) by mouth 2 times daily       atorvastatin (LIPITOR) 20 MG tablet Take 20 mg by mouth At Bedtime        cholecalciferol 1000 units TABS Take 2,000 Units by  mouth daily        fish oil-omega-3 fatty acids 1000 MG capsule Take 1 g by mouth daily       furosemide (LASIX) 20 MG tablet Take 1 tablet (20 mg) by mouth every other day 45 tablet 3     lisinopril (PRINIVIL/ZESTRIL) 20 MG tablet Take 20 mg by mouth daily       metFORMIN (GLUCOPHAGE) 1000 MG tablet Take 1 tablet (1,000 mg) by mouth 2 times daily (with meals) 180 tablet 0     metoprolol succinate ER (TOPROL-XL) 25 MG 24 hr tablet Take 0.5 tablets (12.5 mg) by mouth daily       tamsulosin (FLOMAX) 0.4 MG capsule Take 1 capsule (0.4 mg) by mouth daily         ALLERGIES   No Known Allergies    PAST MEDICAL HISTORY:  Past Medical History:   Diagnosis Date     Cerebral infarction (H)     Residual left hemiparesis, on Plavix     Detached retina, left      DM2 (diabetes mellitus, type 2) (H)      Hypertension      Lymphoma     treated with Chemo/Rad in , recurrence 2017 tx'd with chemo       PAST SURGICAL HISTORY:  Past Surgical History:   Procedure Laterality Date     APPENDECTOMY  194     BACK SURGERY       EYE SURGERY      cataracts       FAMILY HISTORY:  Family History   Problem Relation Age of Onset     Abdominal Aortic Aneurysm Mother          at age 99     Myocardial Infarction Father          age 65       SOCIAL HISTORY:  Social History     Socioeconomic History     Marital status:      Spouse name: None     Number of children: 3     Years of education: None     Highest education level: None   Occupational History     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   Social Needs     Financial resource strain: None     Food insecurity:     Worry: None     Inability: None     Transportation needs:     Medical: None     Non-medical: None   Tobacco Use     Smoking status: Former Smoker     Last attempt to quit: 1980     Years since quittin.6     Smokeless tobacco: Never Used     Tobacco comment: 1 pot of coffee per day, diet cola daily    Substance and Sexual Activity      "Alcohol use: Yes     Comment: 1 every couple of weeks      Drug use: Never     Sexual activity: None   Lifestyle     Physical activity:     Days per week: None     Minutes per session: None     Stress: None   Relationships     Social connections:     Talks on phone: None     Gets together: None     Attends Zoroastrian service: None     Active member of club or organization: None     Attends meetings of clubs or organizations: None     Relationship status: None     Intimate partner violence:     Fear of current or ex partner: None     Emotionally abused: None     Physically abused: None     Forced sexual activity: None   Other Topics Concern     Parent/sibling w/ CABG, MI or angioplasty before 65F 55M? Not Asked   Social History Narrative    , has 3 children, lives by himself in an apartment.  Has living will, desires DNR/DNI.  (last updated 4/11/2019)        Review of Systems:  Skin:  Positive for bruising     Eyes:  Negative      ENT:  Negative      Respiratory:  Negative       Cardiovascular:  Negative;palpitations;chest pain;edema;lightheadedness;dizziness      Gastroenterology: Negative      Genitourinary:  Negative      Musculoskeletal:  Negative      Neurologic:  Negative      Psychiatric:  Positive for sleep disturbances    Heme/Lymph/Imm:  Negative      Endocrine:  Positive for diabetes      Physical Exam:  Vitals: /66   Pulse 63   Ht 1.791 m (5' 10.5\")   Wt 83.3 kg (183 lb 11.2 oz)   BMI 25.99 kg/m    Constitutional:         Skin:           Head:         Eyes:         Lymph:    ENT:         Neck:         Respiratory:          Cardiac:                                                         GI:         Extremities and Muscular Skeletal:               Neurological:         Psych:         Recent Lab Results:  LIPID RESULTS:  No results found for: CHOL, HDL, LDL, TRIG, CHOLHDLRATIO    LIVER ENZYME RESULTS:  No results found for: AST, ALT    CBC RESULTS:  Lab Results   Component Value Date    WBC " 11.2 (H) 04/11/2019    RBC 4.21 (L) 04/11/2019    HGB 12.8 (L) 04/11/2019    HCT 38.4 (L) 04/11/2019    MCV 91 04/11/2019    MCH 30.4 04/11/2019    MCHC 33.3 04/11/2019    RDW 15.5 (H) 04/11/2019     04/14/2019       BMP RESULTS:  Lab Results   Component Value Date     08/12/2019    POTASSIUM 4.4 08/12/2019    CHLORIDE 102 08/12/2019    CO2 27 08/12/2019    ANIONGAP 14.4 08/12/2019     (H) 08/12/2019    BUN 24 08/12/2019    CR 1.29 08/12/2019    GFRESTIMATED 53 (L) 08/12/2019    GFRESTBLACK 64 08/12/2019    JESU 10.0 08/12/2019        A1C RESULTS:  Lab Results   Component Value Date    A1C 8.3 (H) 04/11/2019       INR RESULTS:  No results found for: INR    CC  Deep Scanlon MD  [address]  [place] [id number]    All medical records were reviewed in detail and discussed with the patient. Greater than 30 mins were spent with the patient, 50% of this time was spent on counseling and coordination of care.  After visit summary was printed and given to the patient.

## 2019-08-23 NOTE — LETTER
8/23/2019    Diane Falcon NP  [employer] [address]  [facility] [address]    RE: Renato MOARN Bib       Dear Colleague,    I had the pleasure of seeing Renato Mccartney in the [facility].    HPI and Plan:   Today I had the pleasure of seeing Renato Mccartney at [facility] in [place]. He is a pleasant 86 year old patient with a past medical history of heart failure with preserved ejection fraction,  paroxysmal atrial fibrillation with a chads vas score of 6, mild aortic stenosis with a mean gradient of 12.5, ischemic CVA with residual left hemiparesis, hypertension who presents to the clinic in consultation for all the above issues.      The patient was recently admitted to the hospital for exacerbation of diastolic heart failure and was treated medically with diuretics with good effect.  BNP decreased from over 10,000 at admission to 5000 at the time of discharge.  He was recently seen in core clinic on 8/12/2019 and no significant changes were made to his regimen.  His dry weight is 188 pounds.    The ongoing issue with the patient has been recent falls.  Since being seen in the clinic 2 weeks ago the patient had 2 more falls.  He got entangled in his walker and fell.  The second time he slipped on compression stocking.  He has since been using nonslip socks.  He is also more careful now and has not sustained a fall in the last 10 days.     His most recent echocardiogram is from 4/12/2019 which showed normal left ventricular size and function.  Mild to moderately dilated left atrium, mildly dilated right atrium, mild mitral regurgitation and mild aortic stenosis with a mean gradient of 12.5, mild aortic root dilation.  But he did not he also had a Holter monitor done in April 2019 which showed atrial fibrillation to be the principal rhythm.  he also had PVCs which were 5% of thetotal QRS.     Assessment plan  1.  Paroxysmal atrial fibrillation  with chads vas score of 6  2.  Heart failure with preserved ejection fraction  3.  Mild aortic stenosis with a mean gradient of 12.5 mmHg  4.  Mild aortic root dilation    The patient is appropriately on anticoagulation for atrial fibrillation.  He has been having frequent falls which is mostly mechanical in nature.  I would continue anticoagulation at this time but I discussed in great detail with the patient and his daughter the risks of sustaining falls and the risk of bleeding in the head.  I told him that he needs to be checked out emergently in the emergency room if he falls sustained a fall and hit his head.  His daughter tells me that he had his falls are not bad and that most of the time he just slumps over.  He is appropriately rate controlled I will continue AV gosia blockage for now.    His heart looks pretty well controlled and he has compression stockings on his own today he has 1+ lower extremity edema bilaterally.  He is taking Lasix every other day and I told him to increase the dose to once daily if he notices lower extremity edema or weight gain.  Albumin is normal.  I will hold off on adding any new medications today.    I will have him come back and see an KOMAL in 4-month see myself and 12 months.  He can mostly definitely come back sooner if he is not feeling well.    Thank you for allowing me to participate in the care of Renato Scanlon MD  Cardiology    Orders Placed This Encounter   Procedures     Follow-Up with Cardiologist     Follow-Up with Cardiac Advanced Practice Provider       No orders of the defined types were placed in this encounter.      There are no discontinued medications.    Encounter Diagnoses   Name Primary?     Persistent atrial fibrillation (H)      Nonrheumatic aortic valve stenosis Yes     Heart failure with preserved ejection fraction, unspecified HF chronicity (H)        CURRENT MEDICATIONS:  Current Outpatient Medications   Medication Sig Dispense  Refill     apixaban ANTICOAGULANT (ELIQUIS) 5 MG tablet Take 1 tablet (5 mg) by mouth 2 times daily       atorvastatin (LIPITOR) 20 MG tablet Take 20 mg by mouth At Bedtime        cholecalciferol 1000 units TABS Take 2,000 Units by mouth daily        fish oil-omega-3 fatty acids 1000 MG capsule Take 1 g by mouth daily       furosemide (LASIX) 20 MG tablet Take 1 tablet (20 mg) by mouth every other day 45 tablet 3     lisinopril (PRINIVIL/ZESTRIL) 20 MG tablet Take 20 mg by mouth daily       metFORMIN (GLUCOPHAGE) 1000 MG tablet Take 1 tablet (1,000 mg) by mouth 2 times daily (with meals) 180 tablet 0     metoprolol succinate ER (TOPROL-XL) 25 MG 24 hr tablet Take 0.5 tablets (12.5 mg) by mouth daily       tamsulosin (FLOMAX) 0.4 MG capsule Take 1 capsule (0.4 mg) by mouth daily         ALLERGIES   No Known Allergies    PAST MEDICAL HISTORY:  Past Medical History:   Diagnosis Date     Cerebral infarction (H)     Residual left hemiparesis, on Plavix     Detached retina, left      DM2 (diabetes mellitus, type 2) (H)      Hypertension      Lymphoma 2008    treated with Chemo/Rad in , recurrence 2017 tx'd with chemo       PAST SURGICAL HISTORY:  Past Surgical History:   Procedure Laterality Date     APPENDECTOMY  1949     BACK SURGERY       EYE SURGERY      cataracts       FAMILY HISTORY:  Family History   Problem Relation Age of Onset     Abdominal Aortic Aneurysm Mother          at age 99     Myocardial Infarction Father          age 65       SOCIAL HISTORY:  Social History     Socioeconomic History     Marital status:      Spouse name: None     Number of children: 3     Years of education: None     Highest education level: None   Occupational History     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   Social Needs     Financial resource strain: None     Food insecurity:     Worry: None     Inability: None     Transportation needs:     Medical: None     Non-medical: None   Tobacco  "Use     Smoking status: Former Smoker     Last attempt to quit: 1980     Years since quittin.6     Smokeless tobacco: Never Used     Tobacco comment: 1 pot of coffee per day, diet cola daily    Substance and Sexual Activity     Alcohol use: Yes     Comment: 1 every couple of weeks      Drug use: Never     Sexual activity: None   Lifestyle     Physical activity:     Days per week: None     Minutes per session: None     Stress: None   Relationships     Social connections:     Talks on phone: None     Gets together: None     Attends Hindu service: None     Active member of club or organization: None     Attends meetings of clubs or organizations: None     Relationship status: None     Intimate partner violence:     Fear of current or ex partner: None     Emotionally abused: None     Physically abused: None     Forced sexual activity: None   Other Topics Concern     Parent/sibling w/ CABG, MI or angioplasty before 65F 55M? Not Asked   Social History Narrative    , has 3 children, lives by himself in an apartment.  Has living will, desires DNR/DNI.  (last updated 2019)        Review of Systems:  Skin:  Positive for bruising     Eyes:  Negative      ENT:  Negative      Respiratory:  Negative       Cardiovascular:  Negative;palpitations;chest pain;edema;lightheadedness;dizziness      Gastroenterology: Negative      Genitourinary:  Negative      Musculoskeletal:  Negative      Neurologic:  Negative      Psychiatric:  Positive for sleep disturbances    Heme/Lymph/Imm:  Negative      Endocrine:  Positive for diabetes      Physical Exam:  Vitals: /66   Pulse 63   Ht 1.791 m (5' 10.5\")   Wt 83.3 kg (183 lb 11.2 oz)   BMI 25.99 kg/m     Constitutional:         Skin:           Head:         Eyes:         Lymph:    ENT:         Neck:         Respiratory:          Cardiac:                                                         GI:         Extremities and Muscular Skeletal:               Neurological: "         Psych:         Recent Lab Results:  LIPID RESULTS:  No results found for: CHOL, HDL, LDL, TRIG, CHOLHDLRATIO    LIVER ENZYME RESULTS:  No results found for: AST, ALT    CBC RESULTS:  Lab Results   Component Value Date    WBC 11.2 (H) 04/11/2019    RBC 4.21 (L) 04/11/2019    HGB 12.8 (L) 04/11/2019    HCT 38.4 (L) 04/11/2019    MCV 91 04/11/2019    MCH 30.4 04/11/2019    MCHC 33.3 04/11/2019    RDW 15.5 (H) 04/11/2019     04/14/2019       BMP RESULTS:  Lab Results   Component Value Date     08/12/2019    POTASSIUM 4.4 08/12/2019    CHLORIDE 102 08/12/2019    CO2 27 08/12/2019    ANIONGAP 14.4 08/12/2019     (H) 08/12/2019    BUN 24 08/12/2019    CR 1.29 08/12/2019    GFRESTIMATED 53 (L) 08/12/2019    GFRESTBLACK 64 08/12/2019    JESU 10.0 08/12/2019        A1C RESULTS:  Lab Results   Component Value Date    A1C 8.3 (H) 04/11/2019       INR RESULTS:  No results found for: INR    CC  Deep Scanlon MD  [address]  [place] [address]    All medical records were reviewed in detail and discussed with the patient. Greater than 30 mins were spent with the patient, 50% of this time was spent on counseling and coordination of care.  After visit summary was printed and given to the patient.      Thank you for allowing me to participate in the care of your patient.      Sincerely,     Deep Scanlon MD     [facility]

## 2019-12-13 NOTE — PROGRESS NOTES
Pt's daughter Anne Burnham called. She is calling because pt's PCP, Sophie Vang, CNP, ([facility]/ ph. [phone]) suggested pt come off of his atorvastatin. Sophie told dtr to call us to confirm this would be ok. States she was told it can cause more harm than good at a certain age. Anne also wanting to know when pt needs to f/u with [facility] clinic next, she thought pt was due in December but states she has not heard anything from us. No concerns or symptoms to report today. Anne thinks her sister did not follow through on scheduling this - she thinks that letters and calls go to the older sister.     Reviewed that pt's PCP Sophie can decide whether or not to discontinue pt's atorvastatin, otherwise if no concern for adverse effects that would require him to come off of it now, we can request her records and this can be discussed further at pt's upcoming OV w/either Debra or Dr. Scanlon when he sees him next.     Transferred her to scheduling to make routine CORE F/U w/Debra. Dtr aware this may be later January as schedules full. Again, she confirmed no symptoms or concerns today. Dinah Nolasco RN on 12/13/2019 at 11:50 AM

## 2019-12-29 NOTE — DISCHARGE INSTRUCTIONS
Keep leg elevated when able.  Continue to use Ace wrap for compression.  Return with severe increase in pain.  Follow-up with regular physician for reassessment if pain continues.

## 2019-12-29 NOTE — ED AVS SNAPSHOT
Emergency Department  [address] [place] [address]  Phone:  [phone]  Fax:  [phone]                                    Renato Mccartney   MRN: [id number]    Department:   Emergency Department   Date of Visit:  12/29/2019           After Visit Summary Signature Page    I have received my discharge instructions, and my questions have been answered. I have discussed any challenges I see with this plan with the nurse or doctor.    ..........................................................................................................................................  Patient/Patient Representative Signature      ..........................................................................................................................................  Patient Representative Print Name and Relationship to Patient    ..................................................               ................................................  Date                                   Time    ..........................................................................................................................................  Reviewed by Signature/Title    ...................................................              ..............................................  Date                                               Time          22EPIC Rev 08/18

## 2019-12-29 NOTE — ED PROVIDER NOTES
"  History     Chief Complaint:  Fall      HPI   Renato Mccartney is a 87 year old male diabetic with a history of hypertension, cerebral infarction, and atrial fibrillation  anticoagulated on Eliquis who presents after a fall. The patient says that he fell a week ago while using his walker in his kitchen. He denies hitting his head at the time. He complains of right hip pain and left foot pain. He denies any dizziness or lightheadedness.       Allergies:  No Known Drug Allergies     Medications:    Eliquis  Lipitor  Lasix  Lisinopril  Metformin   Toprol  Flomax    Hydrodiuril    Past Medical History:    Lymphoma  hypertension  Diabetes  Detached retina  Cerebral  infarction  Aortic stenosis  atrial fibrillation  UTI  CKD    Past Surgical History:    Appendectomy  Back surgery  Eye surgery    Family History:    AAA  MI    Social History:  The patient was accompanied to the ED by family.  Smoking Status: Former Smoker  Smokeless Tobacco: Never Used  Alcohol Use: Positive  Drug Use: Negative  PCP: Diane Falcon   Marital Status:        Review of Systems   Musculoskeletal: Positive for arthralgias and myalgias.   Neurological: Negative for dizziness and light-headedness.   All other systems reviewed and are negative.        Physical Exam     Patient Vitals for the past 24 hrs:   BP Temp Temp src Pulse Heart Rate Resp SpO2 Height Weight   12/29/19 1215 131/79 -- -- 53 -- -- 96 % -- --   12/29/19 1115 123/88 -- -- -- -- -- 97 % -- --   12/29/19 1110 123/88 -- -- 57 -- -- 95 % -- --   12/29/19 1005 129/44 97.6  F (36.4  C) Temporal -- 55 18 99 % 1.778 m (5' 10\") 88.5 kg (195 lb)        Physical Exam  Vitals signs reviewed.   HENT:      Head: Normocephalic and atraumatic.   Neck:      Musculoskeletal: Normal range of motion.   Cardiovascular:      Rate and Rhythm: Normal rate.   Musculoskeletal:        Legs:    Neurological:      General: No focal deficit present.      Mental Status: He is alert.   Psychiatric:        "  Mood and Affect: Mood normal.           Emergency Department Course     Imaging:  Radiology findings were communicated with the patient who voiced understanding of the findings.    XR Pelvis w Hip Right 1 View  Mild bilateral femoral head osteophytic spurring. No  apparent right hip or pelvic fracture. Degenerative changes in the  lower lumbar spine. Bilateral femoral artery calcification. Hip joint  space widths are relatively well preserved.  WILLY SHARMA MD  Reading per radiology     XR Tibia & Fibula Left 2 Views  Negative exam.  WILLY SHARMA MD  Reading per radiology    XR Femur Left 2 Views  Nonspecific left knee joint effusion. Extensive arterial  calcification within the thigh. No apparent femur fracture or osseous  destruction.  WILLY SHARMA MD  Reading per radiology     Laboratory:  Laboratory findings were communicated with the patient who voiced understanding of the findings.    CBC: WBC 5.8, HGB 10.1 (L),   BMP: chloride 112 (H),  (H), BUN 35 (H), creatinine 1.26 (H), 51 (L) o/w WNL   INR: 1.60 (H)  ABO/Rh type and screen: A/pos, negative  CK total: 77    Interventions:  1029 Roxicodone 5 mg Oral     Emergency Department Course:    1014 Nursing notes and vitals reviewed.    1016 I performed an exam of the patient as documented above.     1025 IV was inserted and blood was drawn for laboratory testing, results above.     1138 The patient was sent for a XR while in the emergency department, results above.      1212 Patient rechecked and updated.       The patient is discharged to home.     Impression & Plan    Medical Decision Making:  Renato Mccartney is a 87 year old male who presents to the emergency department today for evaluation of fall and thigh injury.  Patient is a few days removed from fall and injury examination shows large hematoma to the leg.  X-rays confirm no fracture of the femur or the tib-fib.  Patient is on anticoagulation doubt DVT suspect hematoma complicated by  anticoagulant use.  No signs of blood loss no signs of rhabdo no clinical concern for compartment syndrome.  Patient recommended compression ice and elevation.  Patient was given some pain medication and trial of ambulation was successful and patient was discharged with family to return with severe increase in pain or severe increase in swelling.    Diagnosis:    ICD-10-CM    1. Hematoma of left thigh, initial encounter S70.12XA      Disposition:   Findings and plan explained to the Patient. Patient discharged home with instructions regarding supportive care, medications, and reasons to return. The importance of close follow-up was reviewed.     Discharge Medications:  Discharge Medication List as of 12/29/2019 12:13 PM      START taking these medications    Details   oxyCODONE (ROXICODONE) 5 MG tablet Take 1 tablet (5 mg) by mouth every 6 hours as needed for pain, Disp-12 tablet, R-0, Local Print             Scribe Disclosure:  Tony MEJÍA, am serving as a scribe at 10:17 AM on 12/29/2019 to document services personally performed by Derrick Carranza MD based on my observations and the provider's statements to me.       EMERGENCY DEPARTMENT       Derrick Carranza MD  12/31/19 1031

## 2019-12-30 NOTE — ED AVS SNAPSHOT
Emergency Department  [address] [place] [address]  Phone:  [phone]  Fax:  [phone]                                    Renato Mccartney   MRN: [id number]    Department:   Emergency Department   Date of Visit:  12/30/2019           After Visit Summary Signature Page    I have received my discharge instructions, and my questions have been answered. I have discussed any challenges I see with this plan with the nurse or doctor.    ..........................................................................................................................................  Patient/Patient Representative Signature      ..........................................................................................................................................  Patient Representative Print Name and Relationship to Patient    ..................................................               ................................................  Date                                   Time    ..........................................................................................................................................  Reviewed by Signature/Title    ...................................................              ..............................................  Date                                               Time          22EPIC Rev 08/18

## 2019-12-31 PROBLEM — M79.672 PAIN IN LEFT FOOT: Status: ACTIVE | Noted: 2019-01-01

## 2019-12-31 NOTE — PLAN OF CARE
Discharge Planner PT   Patient plan for discharge: not stated     Current status:     Eval complete, treatment indicated. Edu PT role and POC. Pt on 2 L o2 via NC, baseline. Nurse present throughout. Confirmed WBAT with orhto prior to session. Donned post op shoe prior to OOB mobility for comfort. Pt performed AP and SLR prior to OOB mobility. Supine > sitting EOB with min A x 2 and hand over hand assist to reach for bed rail. Cues to scoot fwd. STS x 3 with FWW, min A x 2 first trial, then max A x 2, very unsteady, posterior lean, PT assist to place LUE on walker. Pt stood to void. Able to side step towards HOB, tolerated WB through LLE. Sit > supine max A x 2. Pt left supine with nursing staff present     Barriers to return to prior living situation: Lives alone, high fall risk, unable to tolerate gait  Recommendations for discharge: TCU  Rationale for recommendations: Pt will benefit from continued skilled PT at TCU to improve strength, balance, gait, endurance to improve functional mobility prior to return home.         Entered by: Alejandra Shea 12/31/2019 4:33 PM

## 2019-12-31 NOTE — PROVIDER NOTIFICATION
Per Rowena Mccoy, patient is weight bearing as tolerated on LLE.  Can order a cam boot or post op shoe if needed

## 2019-12-31 NOTE — PROGRESS NOTES
12/31/19 1618   Quick Adds   Type of Visit Initial PT Evaluation   Living Environment   Lives With alone   Living Arrangements apartment   Home Accessibility no concerns   Living Environment Comment Pt's dtr's live 5 minutes away, has PCA services 3x/week to assist with cleaning and bathing   Self-Care   Usual Activity Tolerance moderate   Current Activity Tolerance poor   Activity/Exercise/Self-Care Comment Pt uses FWW, unable to describe if has adaptive handle for LUE , states no and then yes   Functional Level Prior   Ambulation 1-->assistive equipment   Transferring 1-->assistive equipment   Toileting 1-->assistive equipment   Bathing 3-->assistive equipment and person   Fall history within last six months yes   Number of times patient has fallen within last six months 6   Prior Functional Level Comment Pt is modified IND with FWW household distances, does have wheel chair. Pt has had multiple falls reports due to tripping over walker    General Information   Onset of Illness/Injury or Date of Surgery - Date 12/31/19   Referring Physician Aixa Pace APRN CNP   Pertinent History of Current Problem (include personal factors and/or comorbidities that impact the POC) Mr. Mccartney is an 87-year-old gentleman with a PMH of ischemic stroke with left hemiparesis, DM2, CKD with a creatinine baseline 1.5, hypertension, mild aortic stenosis, paroxysmal atrial fibrillation , who is being admitted for uncontrolled left foot pain with extensive ecchymoses of the left lower extremity following a fall.  As of yet, no acute injury has been identified aside from fractured 5th L toe.    Precautions/Limitations fall precautions   General Observations Per ortho, WBAT can have post op shoe or CAM for comfort    Cognitive Status Examination   Orientation person;place   Cognitive Comment Forgetful, somewhat poor historian, sleepy this PM    Pain Assessment   Patient Currently in Pain Yes, see Vital Sign flowsheet  (improved  "pain symptoms with post op shoe )   Integumentary/Edema   Integumentary/Edema Comments bruising and trace edema BLE    Range of Motion (ROM)   ROM Comment BLE WFL   Strength   Strength Comments Antigravity strength in BLE,  pt very weak functionall, A x 2 to stand EOB    Bed Mobility   Bed Mobility Comments Supine > sit with mod A x 1    Transfer Skills   Transfer Comments STS with FWW and max A x 2 assist for LUE placement    Gait   Gait Comments NT   Balance   Balance Comments poor standing balance with BUE support    Sensory Examination   Sensory Perception Comments intact to light touch    General Therapy Interventions   Planned Therapy Interventions balance training;bed mobility training;gait training;neuromuscular re-education;strengthening;transfer training   Clinical Impression   Criteria for Skilled Therapeutic Intervention yes, treatment indicated   PT Diagnosis impaired IND with functional mobility    Influenced by the following impairments pain, weakness, balance   Functional limitations due to impairments impaired IND with functional mobility    Clinical Presentation Evolving/Changing   Clinical Presentation Rationale lives alone, clinical judgemnet   Clinical Decision Making (Complexity) Moderate complexity   Therapy Frequency Daily   Predicted Duration of Therapy Intervention (days/wks) 1 week   Anticipated Discharge Disposition Transitional Care Facility   Risk & Benefits of therapy have been explained Yes   Patient, Family & other staff in agreement with plan of care Yes   [facility] AM-PAC  \"6 Clicks\" V.2 Basic Mobility Inpatient Short Form   1. Turning from your back to your side while in a flat bed without using bedrails? 2 - A Lot   2. Moving from lying on your back to sitting on the side of a flat bed without using bedrails? 2 - A Lot   3. Moving to and from a bed to a chair (including a wheelchair)? 2 - A Lot   4. Standing up from a chair using your arms (e.g., wheelchair, or bedside " chair)? 2 - A Lot   5. To walk in hospital room? 2 - A Lot   6. Climbing 3-5 steps with a railing? 1 - Total   Basic Mobility Raw Score (Score out of 24.Lower scores equate to lower levels of function) 11   Total Evaluation Time   Total Evaluation Time (Minutes) 15

## 2019-12-31 NOTE — CONSULTS
[facility]    Orthopedic Consultation    Renato Mccartney MRN# [id number]   Age: 87 year old YOB: 1932     Date of Admission: 12/31/2019    Reason for consult: Left leg swelling and ecchymosis        Requesting physician: Aixa Pace CNP       Level of consult: Consult, follow and place orders           Assessment and Plan:   Assessment:   Left leg swelling and ecchymosis post fall       Plan:   Patient was seen and examined by Dr Lopez.  Not appearing as compartment syndrome at this time.  Able to passively dorsi and plantar flex left ankle and great toe with minimal pain, pulses present, compressible compartments and patient resting comfortably at time of exam.  Elevate Left LE on blue foam wedge pillow when in bed.  Continue pain medication prn   Continue to monitor patient with neuro checks   If continued pain with weight bearing on left, could consider applying walking boot or post-op shoe for comfort.    Defer to hospitalist regarding anti-coagulation recommendation.            Chief Complaint:   Left leg pain and swelling following a fall.          History of Present Illness:   This patient is a 87 year old male who presents with the following condition requiring a hospital admission:  Left leg pain, swelling and ecchymosis   Patient presented to the ED for a third time this morning with ongoing pain following a fall.  Patient fell 8-9 days ago when walking with his walker.  He has had increasing pain and bruising since this time.       The patient initially presented on 12/29/2019 and underwent an x-ray of the left femur which showed a nonspecific left knee joint effusion, extensive arterial calcification within the thigh but no apparent femur fracture or osseous destruction.  On that same date, tibia-fibula 2-view x-ray was negative for fracture.  X-ray of the pelvis and right hip showed mild bilateral femoral head osteophytic spurring, no apparent right hip or pelvic  fracture, degenerative changes of the lower lumbar spine, bilateral femoral artery calcification with preservation of hip joint space.  After being discharged with oxycodone with incomplete analgesia, he re-presented on 2019 with ongoing complaints of pain.  Left foot x-ray at that time was negative for fracture or subluxation with mild scattered degenerative changes in the interphalangeal IP joints.      CT scan of left foot obtained:                                                                 IMPRESSION:  1. Motion artifact makes it difficult to exclude nondisplaced  fractures in the metatarsal diaphyses. If the patient has pain in this  region, an MRI exam would be more definitive.  2. Nondisplaced fracture of uncertain age involving the distal aspect  of the proximal phalanx of the little toe.  3. Generalized bony demineralization.  4. Diffuse subcutaneous edema about the ankle and foot.  5. Scattered chondrocalcinosis versus tiny loose bodies related to the  tibiotalar joint.  6. 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    Emergency provider consulted orthopedics with concerns of compartment syndrome.            Past Medical History:     Past Medical History:   Diagnosis Date     Cerebral infarction (H)     Residual left hemiparesis, on Plavix     Detached retina, left      DM2 (diabetes mellitus, type 2) (H) 2000     Hypertension      Lymphoma 2008    treated with Chemo/Rad in 2008, recurrence 2017 tx'd with chemo             Past Surgical History:     Past Surgical History:   Procedure Laterality Date     APPENDECTOMY  1949     BACK SURGERY       EYE SURGERY      cataracts             Social History:     Social History     Tobacco Use     Smoking status: Former Smoker     Last attempt to quit: 1980     Years since quittin.0     Smokeless tobacco: Never Used     Tobacco comment: 1 pot of coffee per day, diet cola daily    Substance Use Topics     Alcohol use: Yes      Comment: 1 every couple of weeks              Family History:     Family History   Problem Relation Age of Onset     Abdominal Aortic Aneurysm Mother          at age 99     Myocardial Infarction Father          age 65             Immunizations:     VACCINE/DOSE   Diptheria   DPT   DTAP   HBIG   Hepatitis A   Hepatitis B   HIB   Influenza   Measles   Meningococcal   MMR   Mumps   Pneumococcal   Polio   Rubella   Small Pox   TDAP   Varicella   Zoster             Allergies:   No Known Allergies          Medications:     Current Facility-Administered Medications   Medication     acetaminophen (TYLENOL) tablet 1,000 mg     apixaban ANTICOAGULANT (ELIQUIS) tablet 5 mg     atorvastatin (LIPITOR) tablet 20 mg     [Held by provider] furosemide (LASIX) tablet 20 mg     HYDROmorphone (PF) (DILAUDID) injection 0.5 mg     lidocaine (LMX4) cream     lidocaine 1 % 0.1-1 mL     [Held by provider] lisinopril (PRINIVIL/ZESTRIL) tablet 20 mg     magnesium citrate solution 148 mL     melatonin tablet 1 mg     melatonin tablet 3 mg     metoclopramide (REGLAN) tablet 5 mg    Or     metoclopramide (REGLAN) injection 5 mg     metoprolol succinate (TOPROL-XL) half-tab 12.5 mg     naloxone (NARCAN) injection 0.1-0.4 mg     ondansetron (ZOFRAN-ODT) ODT tab 4 mg    Or     ondansetron (ZOFRAN) injection 4 mg     Patient is already receiving anticoagulation with heparin, enoxaparin (LOVENOX), warfarin (COUMADIN)  or other anticoagulant medication     polyethylene glycol (MIRALAX/GLYCOLAX) Packet 17 g     prochlorperazine (COMPAZINE) injection 5 mg    Or     prochlorperazine (COMPAZINE) tablet 5 mg    Or     prochlorperazine (COMPAZINE) Suppository 12.5 mg     senna-docusate (SENOKOT-S/PERICOLACE) 8.6-50 MG per tablet 1 tablet    Or     senna-docusate (SENOKOT-S/PERICOLACE) 8.6-50 MG per tablet 2 tablet     sodium chloride (PF) 0.9% PF flush 3 mL     sodium chloride (PF) 0.9% PF flush 3 mL     sodium chloride 0.9% infusion     tamsulosin  "(FLOMAX) capsule 0.4 mg     vitamin D3 (CHOLECALCIFEROL) 2000 units (50 mcg) tablet 2,000 Units             Review of Systems:   CV: NEGATIVE for chest pain, palpitations or peripheral edema  C: NEGATIVE for fever, chills, change in weight  E/M: NEGATIVE for ear, mouth and throat problems  R: NEGATIVE for significant cough or SOB          Physical Exam:   All vitals have been reviewed  Patient Vitals for the past 24 hrs:   BP Temp Temp src Pulse Heart Rate Resp SpO2 Height Weight   12/31/19 1417 -- -- -- -- -- 16 -- -- --   12/31/19 1320 -- -- -- -- -- 18 -- -- --   12/31/19 1237 112/53 97.6  F (36.4  C) Oral -- 77 18 96 % -- --   12/31/19 1046 -- -- -- -- -- -- 98 % -- --   12/31/19 1045 110/82 -- -- 91 -- -- -- -- --   12/31/19 0841 118/78 -- -- 81 -- -- 98 % -- --   12/31/19 0711 -- -- -- -- -- -- 93 % -- --   12/31/19 0710 105/68 -- -- 85 -- -- -- -- --   12/31/19 0537 135/77 99.5  F (37.5  C) Oral -- 83 18 93 % 1.778 m (5' 10\") 88.9 kg (196 lb)       Intake/Output Summary (Last 24 hours) at 12/31/2019 1451  Last data filed at 12/31/2019 1301  Gross per 24 hour   Intake --   Output 0 ml   Net 0 ml     Patient resting comfortably at time of exam  Ecchymosis present throughout Left Lower and upper extremity.  Tender to palpation along the heel, distal and mid calf region.  Able to passively flex and extend the left great toe and dorsi/plantar flex left ankle.  Swelling present in left low leg and thigh, compartments are compressible.  Pulses are present and sensation intact.  Able to flex/extend knee.          Data:   All laboratory data reviewed  Results for orders placed or performed during the hospital encounter of 12/31/19   CBC with platelets differential     Status: Abnormal   Result Value Ref Range    WBC 20.3 (H) 4.0 - 11.0 10e9/L    RBC Count 3.32 (L) 4.4 - 5.9 10e12/L    Hemoglobin 10.5 (L) 13.3 - 17.7 g/dL    Hematocrit 32.4 (L) 40.0 - 53.0 %    MCV 98 78 - 100 fl    MCH 31.6 26.5 - 33.0 pg    MCHC 32.4 " 31.5 - 36.5 g/dL    RDW 16.4 (H) 10.0 - 15.0 %    Platelet Count 225 150 - 450 10e9/L    Diff Method Automated Method     % Neutrophils 93.1 %    % Lymphocytes 1.4 %    % Monocytes 5.1 %    % Eosinophils 0.0 %    % Basophils 0.1 %    % Immature Granulocytes 0.3 %    Nucleated RBCs 0 0 /100    Absolute Neutrophil 18.9 (H) 1.6 - 8.3 10e9/L    Absolute Lymphocytes 0.3 (L) 0.8 - 5.3 10e9/L    Absolute Monocytes 1.0 0.0 - 1.3 10e9/L    Absolute Eosinophils 0.0 0.0 - 0.7 10e9/L    Absolute Basophils 0.0 0.0 - 0.2 10e9/L    Abs Immature Granulocytes 0.1 0 - 0.4 10e9/L    Absolute Nucleated RBC 0.0    Basic metabolic panel     Status: Abnormal   Result Value Ref Range    Sodium 141 133 - 144 mmol/L    Potassium 4.6 3.4 - 5.3 mmol/L    Chloride 109 94 - 109 mmol/L    Carbon Dioxide 21 20 - 32 mmol/L    Anion Gap 11 3 - 14 mmol/L    Glucose 147 (H) 70 - 99 mg/dL    Urea Nitrogen 36 (H) 7 - 30 mg/dL    Creatinine 1.37 (H) 0.66 - 1.25 mg/dL    GFR Estimate 46 (L) >60 mL/min/[1.73_m2]    GFR Estimate If Black 53 (L) >60 mL/min/[1.73_m2]    Calcium 9.5 8.5 - 10.1 mg/dL   CK total     Status: Abnormal   Result Value Ref Range    CK Total 1,080 (HH) 30 - 300 U/L   Hemoglobin A1c     Status: Abnormal   Result Value Ref Range    Hemoglobin A1C 6.6 (H) 0 - 5.6 %          Attestation:  I have reviewed today's vital signs, notes, medications, labs and imaging with Dr. Lopez.  Amount of time performed on this consult: 30 minutes.    Rowena Mccoy PA-C

## 2019-12-31 NOTE — PROGRESS NOTES
[facility] Nurse Inpatient Skin Assessment      Assessment of:   Bilateral groin, base of scrotum and upper inner thigh        Data:   Patient History:     HISTORY OF PRESENT ILLNESS:  Mr. Mccartney is an 87-year-old man with past medical history of ischemic stroke with left hemiparesis, DM2, CKD with a creatinine baseline 1.5, hypertension, who follows with the [facility], mild aortic stenosis, paroxysmal atrial fibrillation on anticoagulation with apixaban, left detached retina, who sustained a fall on approximately 12/24/2019 when he got tangled up in his walker.  He is now presenting for the third time to the emergency department with ongoing pain in his L foot.  The patient initially presented on 12/29/2019 and underwent an x-ray of the left femur which showed a nonspecific left knee joint effusion, extensive arterial calcification within the thigh but no apparent femur fracture or osseous destruction.  On that same date, tibia-fibula 2-view x-ray was negative for fracture.  X-ray of the pelvis and right hip showed mild bilateral femoral head osteophytic spurring, no apparent right hip or pelvic fracture, degenerative changes of the lower lumbar spine, bilateral femoral artery calcification with preservation of hip joint space.  After being discharged with oxycodone, because of incomplete analgesia, he re-presented on 12/30/2019.  Left foot x-ray at that time was negative for fracture or subluxation with mild scattered degenerative changes in the interphalangeal IP joints.  There is bone demineralization and soft tissue swelling in the dorsal forefoot.  The left ankle fracture showed normal joint spaces and alignment, no fracture or subluxation and atherosclerotic calcification.  Again, he was discharged home.        Because of the pain being too bad to get up out of bed, the patient called EMS today, 12/31/19.  He has had progressive ecchymosis which now extends from his ankle  proximally nearly to his gluteal cleft on the left lower extremity.  He describes the pain as nearly constant with intermittent waves of exacerbation between doses of analgesics.  At its worst, the pain is 9-10/10 and he has had minimal relief with oxycodone and acetaminophen.  He has been ambulatory, but has mostly been in bed for the last week.  He has been able to take his medications, but has not taken them since 12/30/2019.  He denies any paresthesias of the LLE.  Although he has been ambulatory, it has been very painful to walk.      Josue Risk Assessment  Sensory Perception: 2-->very limited    Moisture: 3-->occasionally moist   Activity: 1-->bedfast     Mobility: 2-->very limited   Nutrition: 2-->probably inadequate   Josue Score: 12      Positioning: Rt /Lt turning; heel suspension     Mattress: atmos air      Moisture Management:  Brief for UIC/FIC      Current Diet / Nutrition:         Labs:   Recent Labs   Lab Test 12/31/19  0625 12/29/19  1025   HGB 10.5* 10.1*   INR  --  1.60*   WBC 20.3* 5.8   A1C 6.6*  --          Skin Assessment (location):  Bilateral groin, upper inner thigh and base of scrotum      Skin:     Color: bright erythema with satellite lesions     Temperature warm    Drainage:  Moisture secondary to incontinence    Odor: none    Pain:  Unable to determine          Intervention:     Patient's chart evaluated.      Assessed/inspected    Orders  In Epic and Miconazole ordered        Assessment:         Bilateral groin, base of scrotum and upper inner thighs:  Candida rash          Plan:   Nursing to notify the Provider(s) and re-consult the WOC Nurse if skin deteriorate(s).    Skin care to candida rash in bilateral inner thigh, groin and base of scrotum TID and prn   1. Clean fold to base of fold with bag bag. Pat completely dry   2. Dust all areas with Miconazole powder 2%   3. If beauchmap in place leave diaper open under patient   4. Incontinence protocol for FIC  PIP:   1. Rt/Lt  positioning only  2. HOB below 30 degrees if not medically contraindicated  3. Heel boots with heel suspension on pillows  4. Mobilze      WOC Nurse will return weekly and prn

## 2019-12-31 NOTE — PROGRESS NOTES
RECEIVING UNIT ED HANDOFF REVIEW    ED Nurse Handoff Report was reviewed by: Scar Puentes RN on December 31, 2019 at 11:34 AM

## 2019-12-31 NOTE — ED NOTES
"[facility]  ED Nurse Handoff Report    ED Chief complaint: Leg Pain and Back Pain      ED Diagnosis:   Final diagnoses:   None       Code Status: DNR / DNI    Allergies: No Known Allergies    Activity level - Baseline/Home:  Independent  Activity Level - Current:   Assist of 2    Patient's Preferred language: English   Needed?: No    Isolation: No  Infection: Not Applicable  Bariatric?: No    Vital Signs:   Vitals:    12/31/19 0537 12/31/19 0710 12/31/19 0711 12/31/19 0841   BP: 135/77 105/68  118/78   Pulse:  85  81   Resp: 18      Temp: 99.5  F (37.5  C)      TempSrc: Oral      SpO2: 93%  93% 98%   Weight: 88.9 kg (196 lb)      Height: 1.778 m (5' 10\")          Cardiac Rhythm: ,        Pain level: 0-10 Pain Scale: 5    Is this patient confused?: No   Does this patient have a guardian?  No         If yes, is there guardianship documents in the Epic \"Code/ACP\" activity?  N/A         Guardian Notified?  N/A  [place] - Suicide Severity Rating Scale Completed?  Yes  If yes, what color did the patient score?  White    Patient Report: Initial Complaint: Pt presents to ED complaining of left leg pain spreading from his foot to his knee that started after a fall 8 days prior.  Focused Assessment: Pt complains of left lower leg pain and bruising that started after a fall 8 days prior.  Pt reports pain comes in intermittent waves.  Pt anticoagulated and has extensive bruising on his left lower leg.  Sensation present in left foot.  PtO2 sats decreased to the mid 80s after IV morphine, sats currently maitained in the mid 90s on 4 liter O2 via NC.  Pt uses a walker at home.  Pt alert and oriented x4.  Tests Performed:   Labs Ordered and Resulted from Time of ED Arrival Up to the Time of Departure from the ED   CBC WITH PLATELETS DIFFERENTIAL - Abnormal; Notable for the following components:       Result Value    WBC 20.3 (*)     RBC Count 3.32 (*)     Hemoglobin 10.5 (*)     Hematocrit 32.4 (*)     " RDW 16.4 (*)     Absolute Neutrophil 18.9 (*)     Absolute Lymphocytes 0.3 (*)     All other components within normal limits   BASIC METABOLIC PANEL - Abnormal; Notable for the following components:    Glucose 147 (*)     Urea Nitrogen 36 (*)     Creatinine 1.37 (*)     GFR Estimate 46 (*)     GFR Estimate If Black 53 (*)     All other components within normal limits   CK TOTAL - Abnormal; Notable for the following components:    CK Total 1,080 (*)     All other components within normal limits   VITAL SIGNS   PULSE OXIMETRY NURSING   PERIPHERAL IV CATHETER   CT results pedning    Abnormal Results: See above  Treatments provided: 20 G IV placed in right AC, pt given 1 liter NS and 4 mg IV morphine x3.    Family Comments: None    OBS brochure/video discussed/provided to patient/family: NA              Name of person given brochure if not patient: NA              Relationship to patient: NA    ED Medications:   Medications   ondansetron (ZOFRAN) injection 4 mg (4 mg Intravenous Given 12/31/19 0634)   0.9% sodium chloride BOLUS (1,000 mLs Intravenous New Bag 12/31/19 0845)     Followed by   sodium chloride 0.9% infusion (has no administration in time range)   morphine (PF) injection 4 mg (4 mg Intravenous Given 12/31/19 0845)       Drips infusing?:  No    For the majority of the shift this patient was Green.   Interventions performed were NA.    Severe Sepsis OR Septic Shock Diagnosis Present: No    To be done/followed up on inpatient unit:  NA    ED NURSE PHONE NUMBER: [phone]

## 2019-12-31 NOTE — ED TRIAGE NOTES
Pt had a fall 1 week ago. Was seen here yesterday for left foot pain, and told it was from being on 2 blood thinners and the blood drainging to his foot. Pt reports worsening foot pain today. Pt was sent home yesterday with oxy and took one this am but not any since.

## 2019-12-31 NOTE — PROGRESS NOTES
Hospitalist note    Paged re: Urinary retention 425mL on wards but +voided in ED  -resumed PTA flomax, not taken since 12/29  -please make 2nd attempt at straight catheterization  -if unable to pass catheter, will need urology consult    Aixa Pace CNP  Hospitalist - [facility]  [phone]     Text Page

## 2019-12-31 NOTE — ED PROVIDER NOTES
"  History     Chief Complaint:  Foot Pain    HPI   Renato Mccartney is a 87 year old male, anticoagulated on Eliquis, with a history of atrial fibrillation, type II diabetes, and cerebral infarction who presents via EMS for evaluation of left foot pain. One week ago, the patient reports the onset of this pain, a couple days after he got \"tangled up\" in his walker and fell against his counter at home. The pain gradually worsened throughout the week with noticeable bruising. Yesterday, his daughter presented him to the ED with concerns for a fracture and he underwent an extensive work up including blood work and right hip, left tib/fib, and left femur x-rays- all of which were unremarkable. He was discharged home on oxycodone. He reports taking one dose of this early this morning, but his pain did not subside. He has not taken any more oxycodone since, but felt the pain continued to worsen on the bottom of his foot to the point where he did not feel he could be at home, so he called EMS. On questioning, he notes that he took one oxycodone pill this morning, but has not taken anything else since.  The patient reports he has been standing and walking on the affected foot. He denies any fever, chills, or new falls. He denies any missed doses of his Eliquis.     From ED Encounter - 12/29/19:   XR Pelvis w Hip Right 1 View  Mild bilateral femoral head osteophytic spurring. No  apparent right hip or pelvic fracture. Degenerative changes in the  lower lumbar spine. Bilateral femoral artery calcification. Hip joint  space widths are relatively well preserved. Reading per radiology      XR Tibia & Fibula Left 2 Views  Negative exam. Reading per radiology     XR Femur Left 2 Views  Nonspecific left knee joint effusion. Extensive arterial  calcification within the thigh. No apparent femur fracture or osseous destruction. Reading per radiology      CBC: WBC 5.8, HGB 10.1 (L),   BMP: chloride 112 (H),  (H), BUN 35 (H), " creatinine 1.26 (H), 51 (L) o/w WNL   INR: 1.60 (H)  ABO/Rh type and screen: A/pos, negative  CK total: 77    Allergies:  No Known Allergies     Medications:    Eliquis  Atorvastatin  Lasix  Lisinopril  Metformin  Metoprolol  Oxycodone  Flomax    Past Medical History:    Cerebral infarction   Left detached retina   Type II diabetes   Hypertension   Lymphoma   Atrial fibrillation   Aortic stenosis     Past Surgical History:    Appendectomy   Cataract removal   Back surgery     Family History:    Mother: AAA  Father: MI     Social History:  Marital Status:   [5]  Presents to the ED via EMS from assisted living facility   Former smoker.   Negative for smokeless tobacco use.   Positive for alcohol use.   Negative for drug use.      Review of Systems   Constitutional: Negative for fever.   Musculoskeletal:        Left foot pain   Skin: Positive for color change (bruising).   All other systems reviewed and are negative.    Physical Exam     Patient Vitals for the past 24 hrs:   BP Temp Temp src Pulse Resp SpO2 Weight   12/30/19 1816 (!) 147/85 98.1  F (36.7  C) Oral 88 16 98 % 88.9 kg (196 lb)      Physical Exam  General: Alert and oriented.    Head: Normocephalic. External ears and nose normal.    Eyes: Pupils equal and round.  Normal tracking.    Pulmonary/Chest: Effort and rate normal    MSK:  Focused exam of the left ankle and foot shows no obvious effusion or erythema. Normal active and passive ROM in ankle and foot. No tenderness to palpation over distal tibia, medial or lateral malleolus.  No tenderness to palpation over the plantar foot.  Grossly intact achilles tendon to palpation.  Compartments soft.  No peripheral edema or asymmetry.    No tenderness to palpation over patella or proximal tibia/fibula, patella with normal ROM in knee.     SKIN: Warm and well perfused.  2+ DP and PT pulses with normal cap refill.  There is some bruising over the skin of the leg.  There is no rash, fluctuance, or  crepitance.  Warm and dry with strong DP or posterior tibial pulses and normal capillary refill.    NEURO:  Normal strength and sensation throughout the foot and toes.  Normal strength in ankle.    PSYCH:  Normal affect      Emergency Department Course   Imaging:  Radiographic findings were communicated with the patient who voiced understanding of the findings.  XR Foot 3 views, Left:   No fracture or subluxation. Scattered mild degenerative  changes in the toe IP joints. Bone demineralization. Mild soft tissue  swelling in the dorsal forefoot, as per radiology.     XR Ankle 3 views, Left:   Normal ankle joint alignment. No fracture. Bone  demineralization. Atherosclerotic calcification, as per radiology.     Interventions:  1825 Dilaudid, 0.5 mg, IV injection   1949 Oxycodone, 5 mg, PO   Tylenol, 1000 mg, PO    Emergency Department Course:  Nursing notes and vitals reviewed.   1807: I performed an exam of the patient as documented above.     The patient was sent for foot and ankle x-rays while in the emergency department, results above.      1935: I rechecked the patient and discussed the results of his workup thus far.     2000: ED tech reports the patient was able to ambulate well with a walker. He is safe for discharge.     Findings and plan explained to the Patient. Patient discharged home with instructions regarding supportive care, medications, and reasons to return. The importance of close follow-up was reviewed.     I personally reviewed the imaging results with the Patient and answered all related questions prior to discharge.    Impression & Plan      Medical Decision Making:  Renato Mccartney is a 87 year old male who presents with foot pain.  Patient history and records reviewed.  Broad differential is considered.  Based on the history and examination findings this is most consistent with soft tissue contusion from the patient's recent fall.  X-rays demonstrate no evidence of fracture.  There is no  evidence of infectious etiology such as cellulitis, abscess, septic joint, necrotizing soft tissue infection, and the patient is afebrile not tachycardic with normal white blood cell count yesterday.  Neurovascular status is intact and there is no evidence of arterial ischemia.  Compartments are soft with no signs of compartment syndrome.  He is therapeutically anticoagulated on Eliquis and denies any missed doses, and examination reveals no asymmetric edema or tenderness in the calves making DVT quite unlikely.  The patient was able to bear weight on this using a walker, and I feel he is safe for discharge back to facility.  He does feel improved following symptomatic treatment.  Return precautions were given for new or worsening symptoms, fevers, rashes, numbness, swelling, inability to move joint or bear weight etc.    Diagnosis:    ICD-10-CM    1. Left foot pain M79.672    2. Contusion of left foot, initial encounter S90.32XA        Disposition:  discharged to home    Scribe Disclosure:  I, Reyna Allen, am serving as a scribe on 12/30/2019 at 6:25 PM to personally document services performed by Fredi Bryan PA-C, based on my observations and the provider's statements to me.       12/30/2019    EMERGENCY DEPARTMENT       Fredi Bryan PA-C  12/30/19 9809

## 2019-12-31 NOTE — PROVIDER NOTIFICATION
"Paged NP Aixa Hong, \"Pt still retaining 425 ml, unable to void. Thanks!\" Waiting for response.   "

## 2019-12-31 NOTE — PROVIDER NOTIFICATION
"Paged Dr. Woodson \"Pt retaining 452 in bladder. No urge to urinate. Thanks!\" Waiting for response.  "

## 2019-12-31 NOTE — PHARMACY-ADMISSION MEDICATION HISTORY
Pharmacy Medication History  Admission medication history interview status for the 12/31/2019  admission is complete. See EPIC admission navigator for prior to admission medications     Medication history sources: Patient's family/friend (Reviewed medications with patient's daughter Anne over the phone)  Medication history source reliability: Good  Adherence assessment: Good    Significant changes made to the medication list:  1) Updated patient's metformin dose to 500mg twice daily (from 1000mg twice daily)  2) Patient now takes furosemide 20mg everyday (before he took every other day)        Medication reconciliation completed by provider prior to medication history? No    Time spent in this activity: 25 minutes      Prior to Admission medications    Medication Sig Last Dose Taking? Auth Provider   apixaban ANTICOAGULANT (ELIQUIS) 5 MG tablet Take 1 tablet (5 mg) by mouth 2 times daily 12/30/2019 at am Yes Merlene Floyd MD   atorvastatin (LIPITOR) 20 MG tablet Take 20 mg by mouth At Bedtime  12/29/2019 at hs Yes Reported, Patient   cholecalciferol 1000 units TABS Take 2,000 Units by mouth daily  12/30/2019 at am Yes Reported, Patient   furosemide (LASIX) 20 MG tablet Take 20 mg by mouth daily 12/30/2019 at am Yes Unknown, Entered By History   lisinopril (PRINIVIL/ZESTRIL) 20 MG tablet Take 20 mg by mouth daily 12/29/2019 at pm Yes Unknown, Entered By History   melatonin 3 MG tablet Take 3 mg by mouth At Bedtime 12/29/2019 at hs Yes Unknown, Entered By History   metFORMIN (GLUCOPHAGE) 500 MG tablet Take 500 mg by mouth 2 times daily (with meals) 12/30/2019 at pm Yes Unknown, Entered By History   metoprolol succinate ER (TOPROL-XL) 25 MG 24 hr tablet Take 0.5 tablets (12.5 mg) by mouth daily 12/30/2019 at am Yes Merlene Floyd MD   oxyCODONE (ROXICODONE) 5 MG tablet Take 1 tablet (5 mg) by mouth every 6 hours as needed for pain 12/30/2019 at am Yes Derrick Carranza MD   tamsulosin (FLOMAX) 0.4  MG capsule Take 1 capsule (0.4 mg) by mouth daily 12/29/2019 at hs Merlene Adam MD

## 2019-12-31 NOTE — ED PROVIDER NOTES
History   Chief Complaint:  Foot Pain     HPI   Renato Mccartney is an anticoagulated 87 year old male with a complex medical history who presents with foot pain. The patient reports that he had a fall eight days for which he was evaluated on 12/29 and 12/30 and had extensive workup though no fractures were revealed (see below). Since the fall, the patient endorses persisting bruising and intermittent pain in his left foot radiating up to the left knee. The pain is worst in the inferior left foot and left ankle and is currently a 9/10. He has been taking oxycodone and Tylenol for the pain which does provide some relief. He has been able to ambulate at home in his apartment with a walker. The patient denies tingling in the foot or ankle.     XR Foot 3 views, Left- 12/30/2019:   No fracture or subluxation. Scattered mild degenerative  changes in the toe IP joints. Bone demineralization. Mild soft tissue  swelling in the dorsal forefoot.     XR Ankle 3 views, Left- 12/30/2019:   Normal ankle joint alignment. No fracture. Bone demineralization. Atherosclerotic calcification.    XR Pelvis w Hip Right 1 View- 12/29/2019:  Mild bilateral femoral head osteophytic spurring. No  apparent right hip or pelvic fracture. Degenerative changes in the  lower lumbar spine. Bilateral femoral artery calcification. Hip joint  space widths are relatively well preserved.    XR Tibia & Fibula Left 2 Views- 12/29/2019:  Negative exam.     XR Femur Left 2 Views- 12/29/2019:  Nonspecific left knee joint effusion. Extensive arterial  calcification within the thigh. No apparent femur fracture or osseous  destruction.    Allergies:  No known drug allergies    Medications:   Eliquis  Atorvastatin  Lasix   Lisinopril   Metformin  Metoprolol succinate ER  Flomax  Oxycodone    Past Medical History:    Cerebral infarction  Detatched retina, left  Diabetes mellitus type II  Hypertension  Lymphoma  Aortic stenosis, mild  Atrial fibrillation  UTI  "    Past Surgical History:    Appendectomy  Back surgery  Cataract IOL     Family History:    AAA  MI    Social History:  Smoking status: Former smoker  Alcohol use: Yes    Review of Systems   Musculoskeletal:        Positive for left foot and leg pain.   Skin: Positive for wound (bruising to left lower leg).   Neurological: Negative for numbness (no tingling in left foot or ankle).   All other systems reviewed and are negative.      Physical Exam   Patient Vitals for the past 24 hrs:   BP Temp Temp src Pulse Heart Rate Resp SpO2 Height Weight   12/31/19 1046 -- -- -- -- -- -- 98 % -- --   12/31/19 1045 110/82 -- -- 91 -- -- -- -- --   12/31/19 0841 118/78 -- -- 81 -- -- 98 % -- --   12/31/19 0711 -- -- -- -- -- -- 93 % -- --   12/31/19 0710 105/68 -- -- 85 -- -- -- -- --   12/31/19 0537 135/77 99.5  F (37.5  C) Oral -- 83 18 93 % 1.778 m (5' 10\") 88.9 kg (196 lb)     Physical Exam  General: Well-nourished, moaning and appears to be in pain  Eyes: PERRL, conjunctivae pink no scleral icterus or conjunctival injection  ENT:  Moist mucus membranes, posterior oropharynx clear without erythema or exudates  Respiratory:  Lungs clear to auscultation bilaterally, no crackles/rubs/wheezes.  Good air movement  CV: Normal rate and rhythm, no murmurs/rubs/gallops. Palpable symmetric DP pulses. Toes slightly cool but this is symmetric. Cap refill 1-2 seconds in toes.  GI:  Abdomen soft and non-distended.  Normoactive BS.  No tenderness, guarding or rebound  Skin: Extensive ecchymoses over left leg and thigh.   Musculoskeletal: Compartments of leg feel soft overall,minimal tenderness in the calf itself. Tenderness diffusely around ankle and foot but pain seems outs of proportion. Able to dorsiflex at ankle and great toe.  Neuro: Alert and oriented to person/place/time. Normal distal sensation to LT.  Psychiatric: Anxious affect      Emergency Department Course   Imaging:  Radiographic findings were communicated with the patient " who voiced understanding of the findings.    CT Foot w/o Contrast:  1. Motion artifact makes it difficult to exclude nondisplaced  fractures in the metatarsal diaphyses. If the patient has pain in this  region, an MRI exam would be more definitive.  2. Nondisplaced fracture of uncertain age involving the distal aspect  of the proximal phalanx of the little toe.  3. Generalized bony demineralization.  4. Diffuse subcutaneous edema about the ankle and foot.  5. Scattered chondrocalcinosis versus tiny loose bodies related to the  tibiotalar joint.  6. 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  As read by Radiology.    CT Ankle Left w/o Contrast:  1. Motion artifact makes it difficult to exclude nondisplaced  fractures in the metatarsal diaphyses. If the patient has pain in this  region, an MRI exam would be more definitive.  2. Nondisplaced fracture of uncertain age involving the distal aspect  of the proximal phalanx of the little toe.  3. Generalized bony demineralization.  4. Diffuse subcutaneous edema about the ankle and foot.  5. Scattered chondrocalcinosis versus tiny loose bodies related to the  tibiotalar joint.  6. 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  As read by Radiology.    Laboratory:  Laboratory findings were communicated with the patient who voiced understanding of the findings.    CBC: WBC 20.3 (H), HGB 10.5 (L) o/w WNL ()  BMP: Glucose 147 (H), Urea 36 (H), GFR 46 (L), Creatinine 1.37 (H) o/w WNL  CK total: 1,080 (HH)    Interventions:  0634 morphine 4 mg IV   Zofran 4 mg IV  0845 morphine 4 mg IV   NS 1L IV Bolus    Emergency Department Course:  Past medical records, nursing notes, and vitals reviewed.   0611 I performed an exam of the patient and obtained history, as documented above.    0815 I spoke to BRIDGET Mehta of orthopedics regarding the patient.     0820 I rechecked the patient. Discussed plan with the patient.    The patient was  sent for a left foot CT and left ankle CT while in the emergency department, findings above.    1035 I discussed the patient with BRIDGET Iglesias who will see the patient here in the ED.    1115 I spoke to Dr. Woodson of the hospitalist service who accepts the patient for admission.     Findings and plan explained to the patient who consents to admission. Discussed the patient with Dr. Woodson, who will admit the patient to an orthopedic bed for further monitoring, evaluation, and treatment.    Impression & Plan    Medical Decision Making:  Renato Mccartney is a 87 year old male on anticoagulated who fell two days ago and comes again for persistent with ankle and foot pain.  Xrays on his initial visit showed no evidence of acute fracture. Given his history and use of Eliquis, I was concerned about the possibility of a compartment syndrome.  CT scans were obtained to rule out occult fracture and did show a possible toe fracture but I do not believe this would account for his pain.  CK was slightly elevated and so we treated with fluids. Labs show a leukoctytosis but he has no fever or other infectious symptoms on exam. His leg does not appear cellulitis. I consulted orthopedics early and they requested that I hold off on checking compartment pressures and that Dr. Myers would evaluate the patient in the hospital.  We treated with analgesics and elevation in addition to IV fluids. Eliquis was held. The patient will come into the hospital for orthopedic consultation and care under hospitalist servie.     Diagnosis:    ICD-10-CM   1. Pain in joint involving ankle and foot, left M25.572   2. Traumatic ecchymosis of left lower leg, initial encounter S80.12XA   3. Traumatic ecchymosis of left thigh, initial encounter S70.12XA        Disposition:  Admitted to orthopedics in the care of Dr. Woodson        12/31/2019   Pillo Woods I, Pillo Woods, am serving as a scribe at 6:11 AM on 12/31/2019 to document services personally  performed by Diana Gibbons MD based on my observations and the provider's statements to me.      Diana Gibbons MD  01/01/20 0557       Diana Gibbons MD  01/01/20 0605

## 2019-12-31 NOTE — ED NOTES
Patient stated he called his daughter for a ride home, but no one witnessed him calling. Attempted to call daughter at number listed in chart but got unverifiable VM message.

## 2019-12-31 NOTE — PLAN OF CARE
VSS. Pain reported in LLE. PRN dilaudid administered with relief. Pt positioned on right side, refusing any repositioning. A/O x4, forgetful at times. Extensive bruising noted on entire body. Pressure ulcer present on coccyx, WOC consulted. Area covered with mepilex. Rash noted on left arm. Groin area denuded, red, irritated and moist. Pt unable to void. Bladder scan for 452 ml, MD paged. Unable to cath pt. MD aware. Pt unable to urinate later in the shift. Bladder scan for 400's. MD paged again. Pt unable to bear weight at this time. LLE elevated, blue wedge ordered. Refusing any food, no appetite at this time. Family updated by phone. Will continue to monitor pt.

## 2019-12-31 NOTE — H&P
Admitted:     12/31/2019      CHIEF COMPLAINT:  Left leg and foot pain.      HISTORY OF PRESENT ILLNESS:  Mr. Mccartney is an 87-year-old man with past medical history of ischemic stroke with left hemiparesis, DM2, CKD with a creatinine baseline 1.5, hypertension, who follows with [facility], mild aortic stenosis, paroxysmal atrial fibrillation on anticoagulation with apixaban, left detached retina, who sustained a fall on approximately 12/24/2019 when he got tangled up in his walker.  He is now presenting for the third time to the emergency department with ongoing pain in his L foot.  The patient initially presented on 12/29/2019 and underwent an x-ray of the left femur which showed a nonspecific left knee joint effusion, extensive arterial calcification within the thigh but no apparent femur fracture or osseous destruction.  On that same date, tibia-fibula 2-view x-ray was negative for fracture.  X-ray of the pelvis and right hip showed mild bilateral femoral head osteophytic spurring, no apparent right hip or pelvic fracture, degenerative changes of the lower lumbar spine, bilateral femoral artery calcification with preservation of hip joint space.  After being discharged with oxycodone, because of incomplete analgesia, he re-presented on 12/30/2019.  Left foot x-ray at that time was negative for fracture or subluxation with mild scattered degenerative changes in the interphalangeal IP joints.  There is bone demineralization and soft tissue swelling in the dorsal forefoot.  The left ankle fracture showed normal joint spaces and alignment, no fracture or subluxation and atherosclerotic calcification.  Again, he was discharged home.        Because of the pain being too bad to get up out of bed, the patient called EMS today, 12/31/19.  He has had progressive ecchymosis which now extends from his ankle proximally nearly to his gluteal cleft on the left lower extremity.  He describes the pain as nearly constant  with intermittent waves of exacerbation between doses of analgesics.  At its worst, the pain is 9-10/10 and he has had minimal relief with oxycodone and acetaminophen.  He has been ambulatory, but has mostly been in bed for the last week.  He has been able to take his medications, but has not taken them since 12/30/2019.  He denies any paresthesias of the LLE.  Although he has been ambulatory, it has been very painful to walk.       In the ED, laboratory data were obtained which showed a creatinine of 1.37, CK of 1000, up from 77 on 12/29/2019.  He has new leukocytosis of 20,000.  Again, up from 5800  on 12/29/2019.  Anemia is unchanged at 10.5 grams.  He underwent CT imaging of the left foot and ankle without contrast on 12/31/2019 which showed motion artifact making it difficult to exclude nondisplaced fractures of the metatarsal diaphysis with recommendations for an MRI as a more definitive exam.  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  With his elevated CK, leukocytosis and pain seemingly out of proportion to overt injury requiring a total of 12 mg of morphine, orthopedics was consulted out of concern for compartment syndrome.  Orthopedic PA has evaluated the patient and orthopedic physician evaluation is pending.  Hospitalist Service has been asked to admit the patient to inpatient for further evaluation and management.        The patient otherwise denies fevers, chills, ill contacts, chest pain or shortness of breath.  He has a cough.  Despite frequent opioid analgesic use, he been moving bowels, most recently on 12/30/2019.  He has been checking blood sugars daily.  No recent  hypoglycemic episodes and reports easy bleeding with any bumps, etc., with his apixaban use.  As above, Hospitalist Service has been asked to admit the patient for further evaluation and management.      PAST MEDICAL HISTORY:   1.  Ischemic stroke with left hemiparesis.   2.  DM 2 with last A1c in April 2019 of 8.3%.   3.  Hypertension.   4.  Mild aortic stenosis.   5.  Paroxysmal atrial fibrillation on apixaban for CHADS2-VASc score of 1   6.  Left detached retina.   Past Medical History:   Diagnosis Date     Cerebral infarction (H) 2004    Residual left hemiparesis, on Plavix     Detached retina, left      DM2 (diabetes mellitus, type 2) (H) 2000     Hypertension      Lymphoma 2008    treated with Chemo/Rad in 2008, recurrence 2017 tx'd with chemo      PAST SURGICAL HISTORY:   1.  Appendectomy.   2.  Back surgery.   Past Surgical History:   Procedure Laterality Date     APPENDECTOMY  1949     BACK SURGERY  1954     EYE SURGERY      cataracts      ALLERGIES:  NONE.      SOCIAL HISTORY:  He has not yet established a primary provider locally.  He moved from [place], [place] approximately 1 year ago to the [facility].  He does follow with [facility], Dr. Scanlon but doesn't have a PCP yet.  He is a former smoker, quitting 50 years ago.  Rare alcohol use.  Nondrinker.  Lives alone but has 3 daughters that live locally.  He gets Meals on Wheels and [facility] for twice a week bathing.      FAMILY HISTORY:  Three daughters alive and well.      OUTPATIENT MEDICATIONS:   Medications Prior to Admission   Medication Sig Dispense Refill Last Dose     apixaban ANTICOAGULANT (ELIQUIS) 5 MG tablet Take 1 tablet (5 mg) by mouth 2 times daily   12/30/2019 at am     atorvastatin (LIPITOR) 20 MG tablet Take 20 mg by mouth At Bedtime    12/29/2019 at hs     cholecalciferol 1000 units TABS Take 2,000 Units by mouth daily    12/30/2019 at am     furosemide (LASIX) 20 MG tablet Take 20 mg by  mouth daily   12/30/2019 at am     lisinopril (PRINIVIL/ZESTRIL) 20 MG tablet Take 20 mg by mouth daily   12/29/2019 at pm     melatonin 3 MG tablet Take 3 mg by mouth At Bedtime   12/29/2019 at hs     metFORMIN (GLUCOPHAGE) 500 MG tablet Take 500 mg by mouth 2 times daily (with meals)   12/30/2019 at pm     metoprolol succinate ER (TOPROL-XL) 25 MG 24 hr tablet Take 0.5 tablets (12.5 mg) by mouth daily   12/30/2019 at am     oxyCODONE (ROXICODONE) 5 MG tablet Take 1 tablet (5 mg) by mouth every 6 hours as needed for pain 12 tablet 0 12/30/2019 at am     tamsulosin (FLOMAX) 0.4 MG capsule Take 1 capsule (0.4 mg) by mouth daily   12/29/2019 at hs      REVIEW OF SYSTEMS:  Ten-point review of systems is negative aside from what is described in HPI.      PHYSICAL EXAMINATION:   GENERAL:  Elderly man who appears stated age, in mild to moderate distress mostly from pain.   Neuro: +follows commands wiggle toes and show 2 fingers bilat, face symmetric, tongue midline, speech fluent  HEENT: NC/AT, pupils equal round 3mm briskly reactive bilat, sclera nonicteric, noninjected, conjunctiva pink, dry oral mucosa  Neck: supple  Heart: irreg irreg rhythm, no rhythm strip, likely afib, normotensive, 2-3/6 systolic murmur heard diffusely across his chest.   Lungs: CTAB upper and lower lobes  Abd:hypoactive bowel sounds, soft, nontender, nondisteded  Ext: +2 bilat edema.  Left lower extremity with palpable dorsalis pedis pulse and Doppler audible posterior tibialis pulse.  The left foot is edematous but the majority of ecchymoses is on his left lower extremity and then on the posterior proximal thigh.  There is also some excoriated intertrigo  w/erythema, malodorous skin in his bilateral groin folds.      ASSESSMENT AND PLAN:  Mr. Mccartney is an 87-year-old gentleman with a PMH of ischemic stroke with left hemiparesis, DM2, CKD with a creatinine baseline 1.5, hypertension, mild aortic stenosis, paroxysmal atrial fibrillation , who is  being admitted for uncontrolled left foot pain with extensive ecchymoses of the left lower extremity following a fall.  As of yet, no acute injury has been identified aside from fractured 5th L toe.      PROBLEM LIST AND PLAN:       Left foot pain with inability to ambulate comfortably and extensive ecchymoses.  Only clearly identifiable injury of the left fifth toe age-indeterminate, nondisplaced fracture.  Possible left metatarsal injury, but not fully visualized on CT imaging.  Given the calcifications noted on x-ray, also have to consider limb ischemia, though the foot is warm, capillary refill is brisk and pulses are easily palpable or Doppler audible.   -- We will consult Orthopedics.   -- We will obtain an MRI of the left foot w/o contrast.   -- We will recheck CK on the a.m. of 01/01/2020.   -- We will check lower extremity venous Doppler to evaluate blood flow.   -- Serial extremity checks w/ CMS.   -- Analgesia with hydromorphone IV prn given his CKD and also schedule q8h tylenol    DM2 reasonably controlled w/ A1C 8.2% as of 04/2019 , without primary care, though compliant with metformin and glucose checks.   -- Check A1c --> 6.6% 12/31/19.   -- We will at the very least check daily a.m. glucose checks.     Leukocytosis, suspect this is related to trauma/LLE injury as he denies any infectious type of history.  SOFA score is 0.  SIRS is 1 for leukocytosis. No concern for sepsis  -- Recheck CBC in the a.m. of 01/01/2019.     Hypertension.   Mild AS.   PAF.   -- Continue PTA metoprolol  --hold acei & lasix until BP more robust  -- Continue PTA apixaban.     Groin intertrigo, likely with cutaneous yeast.     -- We will consult Wound Care ostomy nurse.     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   -- Encourage p.o. intake.   -- Recheck a.m. labs on 01/01/2019, particularly with his elevated CK.   --continue PTA flomax    History of stroke, ischemic w/ residual L hemiparesis.   --  Continue PTA apixaban.     Falls, recurrent  --consult PT    Bowel prophylaxis  --schedule bowel regimen while on opioids    Prophylaxis.   -- We will continue systemic anticoagulation as above.     Anemia, normocytic, acute on chronic, baseline in 11-12g range  --recheck CBC on 20     CODE STATUS:  Discussed with the patient, he desires full resuscitative measures.        Total time was 42 minutes, of which was 29 minutes was face-to-face time, remainder was spent in counseling and coordination of care.        The patient will be independently evaluated by Dr. Susannah Dowell.         SUSANNAH DOWELL DO       As dictated by MARGIE SWANN, CHANEL, CNP            D: 2019   T: 2019   MT: DIMITRIOS      Name:     COLLIN SOL   MRN:      [id number]        Account:      [id number]   :      1932        Admitted:     2019                   Document: [id number]       cc: Kami Falcon NP       Copy for Provider

## 2019-12-31 NOTE — ED TRIAGE NOTES
Lower left extremity pain.  States pain was so bad  He could not get up so called 911.  3rd visit last 3 days.  Took pain meds at 0200

## 2019-12-31 NOTE — ED NOTES
DATE:  12/31/2019   TIME OF RECEIPT FROM LAB:  0832  LAB TEST:  CK 1080   LAB VALUE:  See above  RESULTS GIVEN WITH READ-BACK TO (PROVIDER):  Diana Gibbons MD  TIME LAB VALUE REPORTED TO PROVIDER:   0867

## 2020-01-01 ENCOUNTER — NURSING HOME VISIT (OUTPATIENT)
Dept: GERIATRICS | Facility: CLINIC | Age: 85
End: 2020-01-01
Payer: COMMERCIAL

## 2020-01-01 ENCOUNTER — NURSING HOME VISIT (OUTPATIENT)
Dept: GERIATRICS | Facility: CLINIC | Age: 85
End: 2020-01-01

## 2020-01-01 ENCOUNTER — TRANSFERRED RECORDS (OUTPATIENT)
Dept: HEALTH INFORMATION MANAGEMENT | Facility: CLINIC | Age: 85
End: 2020-01-01

## 2020-01-01 ENCOUNTER — APPOINTMENT (OUTPATIENT)
Dept: PHYSICAL THERAPY | Facility: CLINIC | Age: 85
DRG: 556 | End: 2020-01-01
Payer: COMMERCIAL

## 2020-01-01 ENCOUNTER — APPOINTMENT (OUTPATIENT)
Dept: GENERAL RADIOLOGY | Facility: CLINIC | Age: 85
DRG: 556 | End: 2020-01-01
Attending: INTERNAL MEDICINE
Payer: COMMERCIAL

## 2020-01-01 ENCOUNTER — TELEPHONE (OUTPATIENT)
Dept: GERIATRICS | Facility: CLINIC | Age: 85
End: 2020-01-01

## 2020-01-01 VITALS
WEIGHT: 196.6 LBS | HEART RATE: 85 BPM | BODY MASS INDEX: 28.15 KG/M2 | OXYGEN SATURATION: 95 % | DIASTOLIC BLOOD PRESSURE: 68 MMHG | HEIGHT: 70 IN | TEMPERATURE: 97.7 F | RESPIRATION RATE: 16 BRPM | SYSTOLIC BLOOD PRESSURE: 134 MMHG

## 2020-01-01 VITALS
DIASTOLIC BLOOD PRESSURE: 101 MMHG | HEIGHT: 70 IN | OXYGEN SATURATION: 93 % | BODY MASS INDEX: 26.63 KG/M2 | SYSTOLIC BLOOD PRESSURE: 153 MMHG | HEART RATE: 56 BPM | RESPIRATION RATE: 20 BRPM | WEIGHT: 186 LBS | TEMPERATURE: 98.6 F

## 2020-01-01 DIAGNOSIS — I48.19 PERSISTENT ATRIAL FIBRILLATION (H): ICD-10-CM

## 2020-01-01 DIAGNOSIS — N18.30 CKD (CHRONIC KIDNEY DISEASE) STAGE 3, GFR 30-59 ML/MIN (H): ICD-10-CM

## 2020-01-01 DIAGNOSIS — R09.02 HYPOXIA: ICD-10-CM

## 2020-01-01 DIAGNOSIS — I10 ESSENTIAL HYPERTENSION: ICD-10-CM

## 2020-01-01 DIAGNOSIS — M79.672 LEFT FOOT PAIN: Primary | ICD-10-CM

## 2020-01-01 DIAGNOSIS — E11.29 TYPE 2 DIABETES MELLITUS WITH OTHER DIABETIC KIDNEY COMPLICATION (H): ICD-10-CM

## 2020-01-01 DIAGNOSIS — I50.31 ACUTE DIASTOLIC CONGESTIVE HEART FAILURE (H): ICD-10-CM

## 2020-01-01 DIAGNOSIS — R53.81 PHYSICAL DECONDITIONING: ICD-10-CM

## 2020-01-01 LAB
ANION GAP SERPL CALCULATED.3IONS-SCNC: 7 MMOL/L (ref 3–14)
ANION GAP SERPL CALCULATED.3IONS-SCNC: 8 MMOL/L (ref 7–16)
BASOPHILS # BLD AUTO: 0 10E9/L (ref 0–0.2)
BASOPHILS NFR BLD AUTO: 0.1 %
BUN SERPL-MCNC: 36 MG/DL (ref 7–26)
BUN SERPL-MCNC: 42 MG/DL (ref 7–30)
CALCIUM SERPL-MCNC: 8.5 MG/DL (ref 8.5–10.1)
CALCIUM SERPL-MCNC: 8.7 MG/DL (ref 8.4–10.4)
CHLORIDE SERPL-SCNC: 111 MMOL/L (ref 94–109)
CHLORIDE SERPLBLD-SCNC: 108 MMOL/L (ref 98–109)
CK SERPL-CCNC: 1581 U/L (ref 30–300)
CK SERPL-CCNC: 893 U/L (ref 30–300)
CO2 SERPL-SCNC: 23 MMOL/L (ref 20–32)
CO2 SERPL-SCNC: 24 MMOL/L (ref 20–29)
CREAT SERPL-MCNC: 0.94 MG/DL (ref 0.73–1.18)
CREAT SERPL-MCNC: 1.29 MG/DL (ref 0.66–1.25)
CREAT SERPL-MCNC: 1.3 MG/DL (ref 0.66–1.25)
DIFFERENTIAL METHOD BLD: ABNORMAL
DIFFERENTIAL: ABNORMAL
EOSINOPHIL # BLD AUTO: 0 10E9/L (ref 0–0.7)
EOSINOPHIL NFR BLD AUTO: 0.1 %
ERYTHROCYTE [DISTWIDTH] IN BLOOD BY AUTOMATED COUNT: 16.9 % (ref 10–15)
ERYTHROCYTE [DISTWIDTH] IN BLOOD BY AUTOMATED COUNT: 17.1 % (ref 11.9–15.5)
FERRITIN SERPL-MCNC: 95 NG/ML (ref 26–388)
GFR SERPL CREATININE-BSD FRML MDRD: 49 ML/MIN/{1.73_M2}
GFR SERPL CREATININE-BSD FRML MDRD: 49 ML/MIN/{1.73_M2}
GFR SERPL CREATININE-BSD FRML MDRD: >60 ML/MIN/1.73M2
GLUCOSE BLDC GLUCOMTR-MCNC: 119 MG/DL (ref 70–99)
GLUCOSE BLDC GLUCOMTR-MCNC: 155 MG/DL (ref 70–99)
GLUCOSE SERPL-MCNC: 105 MG/DL (ref 70–99)
GLUCOSE SERPL-MCNC: 155 MG/DL (ref 70–100)
HCT VFR BLD AUTO: 30.3 % (ref 40–53)
HCT VFR BLD AUTO: 32.5 % (ref 38.8–50)
HEMOGLOBIN: 9.9 G/DL (ref 13.5–17.5)
HGB BLD-MCNC: 9.4 G/DL (ref 13.3–17.7)
HGB BLD-MCNC: 9.6 G/DL (ref 13.3–17.7)
IMM GRANULOCYTES # BLD: 0 10E9/L (ref 0–0.4)
IMM GRANULOCYTES NFR BLD: 0.2 %
IRON SATN MFR SERPL: 5 % (ref 15–46)
IRON SERPL-MCNC: 15 UG/DL (ref 35–180)
IRON STUDY JIC TUBE: ABNORMAL
LACTATE BLD-SCNC: 1 MMOL/L (ref 0.7–2)
LYMPHOCYTES # BLD AUTO: 0.5 10E9/L (ref 0.8–5.3)
LYMPHOCYTES NFR BLD AUTO: 3.5 %
MCH RBC QN AUTO: 30.9 PG (ref 27.6–33.3)
MCH RBC QN AUTO: 31.8 PG (ref 26.5–33)
MCHC RBC AUTO-ENTMCNC: 30.5 G/DL (ref 31.5–35.2)
MCHC RBC AUTO-ENTMCNC: 31.7 G/DL (ref 31.5–36.5)
MCV RBC AUTO: 100 FL (ref 78–100)
MCV RBC AUTO: 101.6 FL (ref 80–100)
MONOCYTES # BLD AUTO: 0.9 10E9/L (ref 0–1.3)
MONOCYTES NFR BLD AUTO: 6.5 %
NEUTROPHILS # BLD AUTO: 11.9 10E9/L (ref 1.6–8.3)
NEUTROPHILS NFR BLD AUTO: 89.6 %
NRBC # BLD AUTO: 0 10*3/UL
NRBC BLD AUTO-RTO: 0 /100
PLATELET # BLD AUTO: 186 10E9/L (ref 150–450)
PLATELET # BLD AUTO: 286 10^9/L (ref 150–450)
POTASSIUM SERPL-SCNC: 4.3 MMOL/L (ref 3.5–5.1)
POTASSIUM SERPL-SCNC: 4.4 MMOL/L (ref 3.4–5.3)
RBC # BLD AUTO: 3.02 10E12/L (ref 4.4–5.9)
RBC # BLD AUTO: 3.2 10^12/L (ref 4.32–5.72)
SODIUM SERPL-SCNC: 140 MMOL/L (ref 136–145)
SODIUM SERPL-SCNC: 141 MMOL/L (ref 133–144)
TIBC SERPL-MCNC: 295 UG/DL (ref 240–430)
WBC # BLD AUTO: 13.3 10E9/L (ref 4–11)
WBC # BLD AUTO: 20.5 10^9/L (ref 3.5–10.5)

## 2020-01-01 PROCEDURE — 83605 ASSAY OF LACTIC ACID: CPT | Performed by: INTERNAL MEDICINE

## 2020-01-01 PROCEDURE — G0378 HOSPITAL OBSERVATION PER HR: HCPCS

## 2020-01-01 PROCEDURE — 99233 SBSQ HOSP IP/OBS HIGH 50: CPT | Performed by: INTERNAL MEDICINE

## 2020-01-01 PROCEDURE — 36415 COLL VENOUS BLD VENIPUNCTURE: CPT | Performed by: NURSE PRACTITIONER

## 2020-01-01 PROCEDURE — 25800030 ZZH RX IP 258 OP 636: Performed by: INTERNAL MEDICINE

## 2020-01-01 PROCEDURE — 12000000 ZZH R&B MED SURG/OB

## 2020-01-01 PROCEDURE — 25000132 ZZH RX MED GY IP 250 OP 250 PS 637: Performed by: NURSE PRACTITIONER

## 2020-01-01 PROCEDURE — 36415 COLL VENOUS BLD VENIPUNCTURE: CPT | Performed by: INTERNAL MEDICINE

## 2020-01-01 PROCEDURE — 82565 ASSAY OF CREATININE: CPT | Performed by: INTERNAL MEDICINE

## 2020-01-01 PROCEDURE — 25000132 ZZH RX MED GY IP 250 OP 250 PS 637: Performed by: INTERNAL MEDICINE

## 2020-01-01 PROCEDURE — 83540 ASSAY OF IRON: CPT | Performed by: INTERNAL MEDICINE

## 2020-01-01 PROCEDURE — 00000146 ZZHCL STATISTIC GLUCOSE BY METER IP

## 2020-01-01 PROCEDURE — 97530 THERAPEUTIC ACTIVITIES: CPT | Mod: GP

## 2020-01-01 PROCEDURE — 99309 SBSQ NF CARE MODERATE MDM 30: CPT | Performed by: NURSE PRACTITIONER

## 2020-01-01 PROCEDURE — 99207 ZZC CDG-CODE INCORRECT PER BILLING BASED ON TIME: CPT | Performed by: NURSE PRACTITIONER

## 2020-01-01 PROCEDURE — 82550 ASSAY OF CK (CPK): CPT | Performed by: NURSE PRACTITIONER

## 2020-01-01 PROCEDURE — 80048 BASIC METABOLIC PNL TOTAL CA: CPT | Performed by: NURSE PRACTITIONER

## 2020-01-01 PROCEDURE — 83550 IRON BINDING TEST: CPT | Performed by: INTERNAL MEDICINE

## 2020-01-01 PROCEDURE — 82728 ASSAY OF FERRITIN: CPT | Performed by: INTERNAL MEDICINE

## 2020-01-01 PROCEDURE — 71046 X-RAY EXAM CHEST 2 VIEWS: CPT

## 2020-01-01 PROCEDURE — 85025 COMPLETE CBC W/AUTO DIFF WBC: CPT | Performed by: NURSE PRACTITIONER

## 2020-01-01 PROCEDURE — 82550 ASSAY OF CK (CPK): CPT | Performed by: INTERNAL MEDICINE

## 2020-01-01 PROCEDURE — 85018 HEMOGLOBIN: CPT | Performed by: INTERNAL MEDICINE

## 2020-01-01 PROCEDURE — 99239 HOSP IP/OBS DSCHRG MGMT >30: CPT | Performed by: INTERNAL MEDICINE

## 2020-01-01 PROCEDURE — 25800030 ZZH RX IP 258 OP 636: Performed by: EMERGENCY MEDICINE

## 2020-01-01 RX ORDER — IPRATROPIUM BROMIDE AND ALBUTEROL SULFATE 2.5; .5 MG/3ML; MG/3ML
1 SOLUTION RESPIRATORY (INHALATION) EVERY 4 HOURS PRN
COMMUNITY

## 2020-01-01 RX ORDER — FUROSEMIDE 20 MG
20 TABLET ORAL DAILY
Status: DISCONTINUED | OUTPATIENT
Start: 2020-01-01 | End: 2020-01-01 | Stop reason: HOSPADM

## 2020-01-01 RX ORDER — HYDROMORPHONE HYDROCHLORIDE 2 MG/1
1 TABLET ORAL EVERY 4 HOURS PRN
Qty: 10 TABLET | Refills: 0 | Status: SHIPPED | OUTPATIENT
Start: 2020-01-01

## 2020-01-01 RX ORDER — FUROSEMIDE 20 MG
20 TABLET ORAL DAILY
Status: DISCONTINUED | OUTPATIENT
Start: 2020-01-01 | End: 2020-01-01

## 2020-01-01 RX ORDER — HYDROMORPHONE HYDROCHLORIDE 1 MG/ML
0.2 INJECTION, SOLUTION INTRAMUSCULAR; INTRAVENOUS; SUBCUTANEOUS
Status: DISCONTINUED | OUTPATIENT
Start: 2020-01-01 | End: 2020-01-01 | Stop reason: HOSPADM

## 2020-01-01 RX ADMIN — ACETAMINOPHEN 1000 MG: 500 TABLET, FILM COATED ORAL at 04:25

## 2020-01-01 RX ADMIN — ACETAMINOPHEN 1000 MG: 500 TABLET, FILM COATED ORAL at 02:30

## 2020-01-01 RX ADMIN — SODIUM CHLORIDE 1000 ML: 9 INJECTION, SOLUTION INTRAVENOUS at 06:48

## 2020-01-01 RX ADMIN — FUROSEMIDE 20 MG: 20 TABLET ORAL at 11:19

## 2020-01-01 RX ADMIN — ATORVASTATIN CALCIUM 20 MG: 20 TABLET, FILM COATED ORAL at 22:03

## 2020-01-01 RX ADMIN — MELATONIN 3 MG: 3 TAB ORAL at 21:08

## 2020-01-01 RX ADMIN — CHOLECALCIFEROL TAB 50 MCG (2000 UNIT) 2000 UNITS: 50 TAB at 09:34

## 2020-01-01 RX ADMIN — SENNOSIDES AND DOCUSATE SODIUM 2 TABLET: 8.6; 5 TABLET ORAL at 21:01

## 2020-01-01 RX ADMIN — ACETAMINOPHEN 1000 MG: 500 TABLET, FILM COATED ORAL at 09:55

## 2020-01-01 RX ADMIN — TAMSULOSIN HYDROCHLORIDE 0.4 MG: 0.4 CAPSULE ORAL at 22:03

## 2020-01-01 RX ADMIN — SENNOSIDES AND DOCUSATE SODIUM 2 TABLET: 8.6; 5 TABLET ORAL at 09:34

## 2020-01-01 RX ADMIN — METOPROLOL SUCCINATE 12.5 MG: 25 TABLET, EXTENDED RELEASE ORAL at 09:34

## 2020-01-01 RX ADMIN — CHOLECALCIFEROL TAB 50 MCG (2000 UNIT) 2000 UNITS: 50 TAB at 08:42

## 2020-01-01 RX ADMIN — ACETAMINOPHEN 1000 MG: 500 TABLET, FILM COATED ORAL at 10:57

## 2020-01-01 RX ADMIN — HYDROMORPHONE HYDROCHLORIDE 1 MG: 2 TABLET ORAL at 00:31

## 2020-01-01 RX ADMIN — SENNOSIDES AND DOCUSATE SODIUM 2 TABLET: 8.6; 5 TABLET ORAL at 08:09

## 2020-01-01 RX ADMIN — ACETAMINOPHEN 1000 MG: 500 TABLET, FILM COATED ORAL at 17:41

## 2020-01-01 RX ADMIN — SODIUM CHLORIDE 1000 ML: 9 INJECTION, SOLUTION INTRAVENOUS at 18:02

## 2020-01-01 RX ADMIN — HYDROMORPHONE HYDROCHLORIDE 1 MG: 2 TABLET ORAL at 04:58

## 2020-01-01 RX ADMIN — SENNOSIDES AND DOCUSATE SODIUM 2 TABLET: 8.6; 5 TABLET ORAL at 20:23

## 2020-01-01 RX ADMIN — TAMSULOSIN HYDROCHLORIDE 0.4 MG: 0.4 CAPSULE ORAL at 21:08

## 2020-01-01 RX ADMIN — HYDROMORPHONE HYDROCHLORIDE 1 MG: 2 TABLET ORAL at 19:00

## 2020-01-01 RX ADMIN — APIXABAN 5 MG: 5 TABLET, FILM COATED ORAL at 08:42

## 2020-01-01 RX ADMIN — APIXABAN 5 MG: 5 TABLET, FILM COATED ORAL at 08:10

## 2020-01-01 RX ADMIN — SENNOSIDES AND DOCUSATE SODIUM 1 TABLET: 8.6; 5 TABLET ORAL at 08:42

## 2020-01-01 RX ADMIN — ATORVASTATIN CALCIUM 20 MG: 20 TABLET, FILM COATED ORAL at 21:08

## 2020-01-01 RX ADMIN — CHOLECALCIFEROL TAB 50 MCG (2000 UNIT) 2000 UNITS: 50 TAB at 08:01

## 2020-01-01 RX ADMIN — ACETAMINOPHEN 1000 MG: 500 TABLET, FILM COATED ORAL at 11:19

## 2020-01-01 RX ADMIN — APIXABAN 5 MG: 5 TABLET, FILM COATED ORAL at 09:34

## 2020-01-01 RX ADMIN — METOPROLOL SUCCINATE 12.5 MG: 25 TABLET, EXTENDED RELEASE ORAL at 08:09

## 2020-01-01 RX ADMIN — ACETAMINOPHEN 1000 MG: 500 TABLET, FILM COATED ORAL at 18:24

## 2020-01-01 RX ADMIN — MELATONIN 3 MG: 3 TAB ORAL at 22:03

## 2020-01-01 RX ADMIN — HYDROMORPHONE HYDROCHLORIDE 1 MG: 2 TABLET ORAL at 21:01

## 2020-01-01 RX ADMIN — ACETAMINOPHEN 1000 MG: 500 TABLET, FILM COATED ORAL at 04:04

## 2020-01-01 RX ADMIN — APIXABAN 5 MG: 5 TABLET, FILM COATED ORAL at 20:23

## 2020-01-01 RX ADMIN — MICONAZOLE NITRATE: 20 POWDER TOPICAL at 06:54

## 2020-01-01 RX ADMIN — APIXABAN 5 MG: 5 TABLET, FILM COATED ORAL at 21:01

## 2020-01-01 ASSESSMENT — ACTIVITIES OF DAILY LIVING (ADL)
ADLS_ACUITY_SCORE: 20
ADLS_ACUITY_SCORE: 23
ADLS_ACUITY_SCORE: 23
COGNITION: 1 - ATTENTION OR MEMORY DEFICITS
ADLS_ACUITY_SCORE: 22
RETIRED_COMMUNICATION: 0-->UNDERSTANDS/COMMUNICATES WITHOUT DIFFICULTY
ADLS_ACUITY_SCORE: 23
SWALLOWING: 0-->SWALLOWS FOODS/LIQUIDS WITHOUT DIFFICULTY
ADLS_ACUITY_SCORE: 20
ADLS_ACUITY_SCORE: 22
ADLS_ACUITY_SCORE: 23
ADLS_ACUITY_SCORE: 22
ADLS_ACUITY_SCORE: 23
ADLS_ACUITY_SCORE: 22
DRESS: 2-->ASSISTIVE PERSON
RETIRED_EATING: 0-->INDEPENDENT

## 2020-01-01 ASSESSMENT — MIFFLIN-ST. JEOR
SCORE: 1524.94
SCORE: 1573.02

## 2020-01-01 NOTE — PLAN OF CARE
Renato reports continued left ankle pain overnight. He received Tylenol. Declined repositioning overnight. Leg elevated on blue wedge pillow. MRI pending. Slept well. Extensive bruising present. CMS intact. Eric placed on eves after difficult straight cath x 2. Bed alarm for safety.

## 2020-01-01 NOTE — PLAN OF CARE
Pt BR, PT consult pending . Declines turns. LLE elevated. Groin with yeast - orders per WOC.  O2 3 L NC.  BP low MD aware, cont IVF.

## 2020-01-01 NOTE — PROGRESS NOTES
Patient states his pain has slightly improved today.     PE:  LLE elevated on blue foam wedge ramp  Ecchymosis extending from knee down to ankle  Compartments are soft without any tenderness to palpation    Bilateral lower extremity is NVI.  Sensation intact bilateral lower extremities.  Active dorsi and plantar flexion bilaterally.  +DP pulse.       Plan:  Will continue to follow patient  Elevate LLE  Pain control PRN  DVT prophylaxis per hospitalist    Suzy Dumont PA-C on 1/1/2020 at 11:36 AM   Orthopedics

## 2020-01-01 NOTE — PLAN OF CARE
Alert and oriented x 3, forgetful. Eric for retention, difficult insertion. On room air. CMS intact. Ecchymosis on upper and lower extremities. Tylenol for pain. Stood with PT with assist of 2. IVFs infusing. +2 edema on lower extremities. Open area on coccyx, covered with mepilex.

## 2020-01-01 NOTE — PROGRESS NOTES
[facility]    Hospitalist Progress Note      Assessment & Plan   Renato Mccartney is a 87 year old male who was admitted on 12/31/2019. His PMH is significant for ischemic stroke w/ left hemiparesis, DM2, CKD (baseline Cr 1.5), HTN, who [facility]/ [facility], mild aortic stenosis, pAF (on anticoagulation w/ apixaban), left detached retina, s/p fall on 12/24 when he got tangled up w/ walker.    L foot pain  Elevated CK  Presented to ED on 12/31 for 3rd time w/ ongoing L foot pain. XRays negative for fracture (except for a left 5th toe age-indeterminate nondisplaced fracture), CT non-conclusive - there's a possible L metatarsal injury that's not fully visualized on CT imaging. CK 1k in ED (was 77 on 12/29). WBC 20 in ED (was 5.8 on 12/29). Orthopedics evaluated pt and thought the exam was not consistent w/ compartment syndrome. Lower extremity US negative for DVT.  - f/u L foot MRI wo/w contrast  - trend CK. Elevated to 1.5k on 1/1 likely b/c injury from fall, number too low for rhabdomyolysis.  - PRN PO & IV Dilaudid, q8h Tylenol    DM2 reasonably controlled w/ A1C 8.2% as of 04/2019 , without primary care, though compliant with metformin and glucose checks.   - Check A1c --> 6.6% 12/31/19.   - daily AM glucose     Leukocytosis, suspect this is related to trauma/LLE injury as he denies any infectious type of history.  SOFA score is 0.  SIRS is 1 for leukocytosis. No concern for sepsis. WBC improved to 13.3 on 01/01/20.  - CTM for infectious symptoms     Hypertension.   Mild AS.   PAF.   - Continue PTA metoprolol  - hold acei & lasix until BP more robust  - Continue PTA apixaban.      Groin intertrigo, likely with cutaneous yeast.     - We will consult Wound Care ostomy nurse.      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 No worsening Cr despite elevated CK.  - continue PTA flomax     History of stroke, ischemic w/ residual L hemiparesis.   - Continue PTA apixaban.       Falls, recurrent  - consult PT     Bowel prophylaxis  - schedule bowel regimen while on opioids     Prophylaxis.   - We will continue systemic anticoagulation as above.      Anemia, normocytic, acute on chronic, baseline in 11-12g range, most likely anemia of chronic disease.  - Fe studies      DVT Prophylaxis: Warfarin  Code Status: Full Code  Expected discharge: 1-2 days, recommended to transitional care unit once adequate pain management, creatine kinase levels stable, pending MRI results.    Arley Krishnan MD  Text Page (7am - 6pm, M-F)    Interval History   Feels left leg pain has improved since admission.     -Data reviewed today: I reviewed all new labs and imaging results over the last 24 hours. I personally reviewed no images or EKG's today.    Physical Exam   Temp: 98.2  F (36.8  C) Temp src: Oral BP: 100/54   Heart Rate: 72 Resp: 16 SpO2: 97 % O2 Device: Nasal cannula Oxygen Delivery: 3 LPM  Vitals:    12/31/19 0537 01/01/20 0642   Weight: 88.9 kg (196 lb) 89.2 kg (196 lb 9.6 oz)     Vital Signs with Ranges  Temp:  [97.2  F (36.2  C)-98.2  F (36.8  C)] 98.2  F (36.8  C)  Heart Rate:  [72-98] 72  Resp:  [16-18] 16  BP: ()/(45-82) 100/54  SpO2:  [93 %-97 %] 97 %  I/O last 3 completed shifts:  In: 1290 [P.O.:690; I.V.:600]  Out: 1302 [Urine:1302]    General: well-appearing, NAD  HEENT: NC/AT, moist mucus membranes  Heart: irregularly irregular w/ 3/6 murmur best heart at left sternal border  Lungs: CTAB no crackles or wheezes  Abdomen: NABS, non-tender, non-distended; no rebound or guarding; no HSM or masses.  Extremities: warm, well-perfused. Bilateral ankle ecchymoses w/ trace bilateral ankle edema. L foot and distal leg diffusely tender to palpation.  Neuro: CN II-XII grossly intact, moving extremities spontaneously      Medications     - MEDICATION INSTRUCTIONS -         acetaminophen  1,000 mg Oral Q8H     apixaban ANTICOAGULANT  5 mg Oral BID     atorvastatin  20 mg Oral At Bedtime     [Held  by provider] furosemide  20 mg Oral Daily     [Held by provider] lisinopril  20 mg Oral Daily     melatonin  3 mg Oral At Bedtime     metoprolol succinate ER  12.5 mg Oral Daily     senna-docusate  1 tablet Oral BID    Or     senna-docusate  2 tablet Oral BID     sodium chloride (PF)  3 mL Intracatheter Q8H     tamsulosin  0.4 mg Oral At Bedtime     vitamin D3  2,000 Units Oral Daily       Data   Recent Labs   Lab 01/01/20  0705 12/31/19  0625 12/29/19  1025   WBC 13.3* 20.3* 5.8   HGB 9.6* 10.5* 10.1*    98 98    225 206   INR  --   --  1.60*    141 143   POTASSIUM 4.4 4.6 4.4   CHLORIDE 111* 109 112*   CO2 23 21 24   BUN 42* 36* 35*   CR 1.29* 1.37* 1.26*   ANIONGAP 7 11 7   JESU 8.5 9.5 9.1   * 147* 117*       No results found for this or any previous visit (from the past 24 hour(s)).

## 2020-01-02 NOTE — PLAN OF CARE
Alert and oriented. VSS on 3 L oxygen. CMS intact. Bruising left leg. Open area on coccyx, covered with mepilex. Miconazole to groin area. Up with assist of 2 and walker. PO dilaudid and tylenol for left leg pain.  IV saline lock.   Preexisting pressure injury on coccyx. Turn and repositioned from left to right q2h.  Dentures, upper and lower, on table

## 2020-01-02 NOTE — PROGRESS NOTES
Paged MD at 1600 regarding low urine output and increased confusion. Will start bolus now. Continue to monitor.

## 2020-01-02 NOTE — PROGRESS NOTES
Orthopedic Surgery  Renato Mccartney  2020  Admit Date:  2019  Left LE hematoma    Patient resting in bed, states pain is increased again at bedside.    Tolerating oral intake.    Denies nausea or vomiting  Denies chest pain or shortness of breath  No events overnight.     Alert and orient to person, place, and time.  Vital Sign Ranges  Temperature Temp  Av.9  F (36.6  C)  Min: 97.4  F (36.3  C)  Max: 98.2  F (36.8  C)   Blood pressure Systolic (24hrs), Av , Min:100 , Max:121        Diastolic (24hrs), Av, Min:54, Max:64      Pulse No data recorded   Respirations Resp  Av  Min: 16  Max: 16   Pulse oximetry SpO2  Av.8 %  Min: 97 %  Max: 98 %       No LE swelling on exam  Moderate ecchymosis   Minimal erythema of the surrounding skin.   Bilateral calves are soft, non-tender.  Bilateral lower extremity is NVI.  Sensation intact bilateral lower extremities  5/5 motor with resisted dorsi and plantar flexion bilaterally  Cap refill   +Dp pulse    Labs:  Recent Labs   Lab Test 20  0705 19  0625 19  1025   POTASSIUM 4.4 4.6 4.4     Recent Labs   Lab Test 20  0645 20  0705 19  0625   HGB 9.4* 9.6* 10.5*     Recent Labs   Lab Test 19  1025   INR 1.60*     Recent Labs   Lab Test 20  0705 19  0625 19  1025    225 206       A/P  1. Left LE hematoma and foot pain   Continue DVT prophylaxis per hospitalist.     Mobilize with PT/OT    WBAT.   Continue Left LE elevation      Continue current pain regiment.    2. Disposition   Anticipate d/c to TCU based on medical clearance and bed placement - ok to discharge per ortho.    Khalida Queen PA-C

## 2020-01-02 NOTE — PLAN OF CARE
Pt A&O X4, confused at times. Garbled/illogical speech at times. VSS on 3L O2. Encouraging IS. CMS intact, LLE elevated on wedge, bruising throughout LEs. Eric intact. Low output. Starting bolus now. Continue to monitor.

## 2020-01-02 NOTE — PLAN OF CARE
Discharge Planner PT   Patient plan for discharge: not stated   Current status: Pt supine in bed upon arrival; agreeable to attempt therapy. Reports increased pain in bilateral LEs with L > R. Minimal tolerance to supine LE exercises/ROM. Performed supine <> sit with Max A of 2, increased time and cues. Pt demos L hemiparesis from PMH of ischemic stroke. Required hand over hand assist with L UE placement for sitting balance. Increased time to scoot to EOB with feet flat. Static sitting x 5 minutes with CGA of 2. STS transfer x 2 with Mod A of 2 to maintain balance. Required hand over hand assist for hand placement on walker. Noted significant L lateral lean. Tolerated 1 minute of static standing with each transfer. Able to take 3 side steps with Mod A of 2 and FWW; very unsteady. C/o increased pain. Returned pt to supine with LLE elevated on blue wedge foam.   Barriers to return to prior living situation: Lives alone, high fall risk, pain, unable to tolerate gait  Recommendations for discharge: TCU per plan established by the PT.  Rationale for recommendations: Pt will benefit from continued skilled PT at TCU to improve strength, balance, gait, endurance to improve functional mobility prior to return home.       Entered by: Tram Lynn 01/02/2020 9:09 AM

## 2020-01-02 NOTE — CONSULTS
Care Transition Initial Assessment - SW     Met with: Patient   Active Problems:    Pain in left foot       DATA  Lives With: alone   Living Arrangements: apartment  Quality of Family Relationships: involved  Description of Support System: Involved  Who is your support system?: Children  Support Assessment: Adequate family and caregiver support.   Identified issues/concerns regarding health management: PT recommends TCU. Pt in agreement. He has been to [facility] in the past and would like to return. He would prefer a private room and understands he will be billed $45 per day. He also requests transportation and understands he will be billed for the cost of the ride. Pt has [facility] Medicare Advantage and will require a prior-auth. SW will request this once the bed is confirmed.       Quality of Family Relationships: involved    ASSESSMENT  Cognitive Status: Appears alert and oriented.  Concerns to be addressed: On-going discharge planning.     PLAN  Financial costs for the patient includes: Private room and transportation.  Patient given options and choices for discharge: Yes.  Patient/family is agreeable to the plan? YES  Patient Goals and Preferences: TCU.  Patient anticipates discharging to: TCU.    ARGENTINA Zaldivar, LGSW  [phone]  [facility]    Addendum: [facility] accepted pt. SW will start the [employer] prior-auth.

## 2020-01-02 NOTE — PROGRESS NOTES
"Care Coordination:    Noted per 1/1 note, MD anticipated pt will need TCU \"in 1-2 days.\"  However there was no Care Transition RN / SW IP Consult placed to arrange this.     Entered Care Transition RN / SW IP consult, for TCU placement.     ADDENDUM: spoke with Dr. Krishnan, hospitalist.  He notes there is an MRI from 12/31/19 not yet read.  I contacted MRI [id number] and requested this be read asap, please page Dr. Krishnan ([phone]) when resulted.    Marta Diop RN, BSN, PHN  [facility]  [facility]   Mobile: [phone]    "

## 2020-01-02 NOTE — PLAN OF CARE
A&Ox3 forgetful, up with assist of 2 GB and walker, bruising to RLE, mild edema to bilat LE, WBAT with shoe RLE, scheduled tylenol and PRN oral dilaudid for pain, no numbness or tingling, CMS intact, turn and repo Q2hrs, beauchamp in place for retention, miconazole powder to groin, meplex to coccyx, O2 3l NC, IS encouraged, TCU at discharge when pain under control

## 2020-01-02 NOTE — PROGRESS NOTES
[facility]    Hospitalist Progress Note      Assessment & Plan   Renato Mccartney is a 87 year old male who was admitted on 12/31/2019. His PMH is significant for ischemic stroke w/ left hemiparesis, DM2, CKD (baseline Cr 1.5), HTN, who follow w/ the [facility], mild aortic stenosis, pAF (on anticoagulation w/ apixaban), left detached retina, s/p fall on 12/24 when he got tangled up w/ walker.    L foot pain  Elevated CK  Presented to ED on 12/31 for 3rd time w/ ongoing L foot pain. XRays negative for fracture (except for a left 5th toe age-indeterminate nondisplaced fracture), CT non-conclusive - there's a possible L metatarsal injury that's not fully visualized on CT imaging. CK 1k in ED (was 77 on 12/29). WBC 20 in ED (was 5.8 on 12/29). Orthopedics evaluated pt and thought the exam was not consistent w/ compartment syndrome. Lower extremity US negative for DVT.  - f/u L foot MRI wo/w contrast  - trend CK. Elevated to 1.5k on 1/1 likely b/c injury from fall, number too low for rhabdomyolysis. CK improving to 800s on 01/02/20.   - PRN PO & IV Dilaudid, q8h Tylenol    Delirium  Pt called daughter on phone on 01/02/20 afternoon, and per daughter he was not making sense, talking nonsense. I examined pt at bedside, he was a&o x4, and appeared baseline. He may have some delirium likely due to a combination of pain, immobility, and hospitalization.  - frequent reorientation    DM2 reasonably controlled w/ A1C 8.2% as of 04/2019 , without primary care, though compliant with metformin and glucose checks.   - Check A1c --> 6.6% 12/31/19.   - daily AM glucose     Leukocytosis, suspect this is related to trauma/LLE injury as he denies any infectious type of history.  SOFA score is 0.  SIRS is 1 for leukocytosis. No concern for sepsis. WBC improved to 13.3 on 01/01/20.  - CTM for infectious symptoms     Hypertension.   Mild AS.   PAF.   - Continue PTA metoprolol  - hold acei & lasix until BP more robust  -  Continue PTA apixaban     Groin intertrigo, likely with cutaneous yeast.     - We will consult Wound Care ostomy nurse.      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 No worsening Cr despite elevated CK.  - continue PTA flomax     History of stroke, ischemic w/ residual L hemiparesis.   - Continue PTA apixaban.      Falls, recurrent  - consult PT     Bowel prophylaxis  - schedule bowel regimen while on opioids     Prophylaxis.   - We will continue systemic anticoagulation as above.      Anemia, normocytic, acute on chronic, baseline in 11-12g range, most likely anemia of chronic disease.  - Fe studies      DVT Prophylaxis: Warfarin  Code Status: Full Code  Expected discharge: tomorrow, recommended to transitional care unit pending MRI read.  Arley Krishnan MD  Text Page (7am - 6pm, M-F)      Interval History   - This morning complained of 9/10 pain worst in bottom of L foot and left ankle. No other new symptoms  - This afternoon, called daughter on the phone and per daughter was not making sense. She was worried about the pt's mental status. I saw pt at bedside again - he was A&O x4, no focal neuro deficits, and appeared at baseline.       -Data reviewed today: I reviewed all new labs and imaging results over the last 24 hours. I personally reviewed no images or EKG's today.    Physical Exam   Temp: 97.4  F (36.3  C) Temp src: Oral BP: (!) 112/38 Pulse: 54 Heart Rate: 72 Resp: 16 SpO2: 92 % O2 Device: Nasal cannula Oxygen Delivery: 3 LPM  Vitals:    12/31/19 0537 01/01/20 0642   Weight: 88.9 kg (196 lb) 89.2 kg (196 lb 9.6 oz)     Vital Signs with Ranges  Temp:  [97.4  F (36.3  C)-98.1  F (36.7  C)] 97.4  F (36.3  C)  Pulse:  [54] 54  Heart Rate:  [69-83] 72  Resp:  [16] 16  BP: (107-121)/(38-64) 112/38  SpO2:  [92 %-98 %] 92 %  I/O last 3 completed shifts:  In: 300 [P.O.:300]  Out: 700 [Urine:700]    General: well-appearing, NAD  HEENT: NC/AT, moist mucus membranes  Heart: irregularly  irregular w/ 3/6 murmur best heart at left sternal border  Lungs: CTAB no crackles or wheezes  Abdomen: NABS, non-tender, non-distended; no rebound or guarding; no HSM or masses.  Extremities: warm, well-perfused. Bilateral ankle ecchymoses L>R w/ trace bilateral ankle edema. Bottom of L foot and L heel diffusely tender to palpation.  Neuro: CN II-XII intact, moving extremities spontaneously      Medications     - MEDICATION INSTRUCTIONS -         acetaminophen  1,000 mg Oral Q8H     apixaban ANTICOAGULANT  5 mg Oral BID     atorvastatin  20 mg Oral At Bedtime     [Held by provider] furosemide  20 mg Oral Daily     [Held by provider] lisinopril  20 mg Oral Daily     melatonin  3 mg Oral At Bedtime     metoprolol succinate ER  12.5 mg Oral Daily     senna-docusate  1 tablet Oral BID    Or     senna-docusate  2 tablet Oral BID     sodium chloride (PF)  3 mL Intracatheter Q8H     tamsulosin  0.4 mg Oral At Bedtime     vitamin D3  2,000 Units Oral Daily       Data   Recent Labs   Lab 01/02/20  0645 01/01/20  0705 12/31/19  0625 12/29/19  1025   WBC  --  13.3* 20.3* 5.8   HGB 9.4* 9.6* 10.5* 10.1*   MCV  --  100 98 98   PLT  --  186 225 206   INR  --   --   --  1.60*   NA  --  141 141 143   POTASSIUM  --  4.4 4.6 4.4   CHLORIDE  --  111* 109 112*   CO2  --  23 21 24   BUN  --  42* 36* 35*   CR 1.30* 1.29* 1.37* 1.26*   ANIONGAP  --  7 11 7   JESU  --  8.5 9.5 9.1   GLC  --  105* 147* 117*       No results found for this or any previous visit (from the past 24 hour(s)).

## 2020-01-03 NOTE — PLAN OF CARE
A/Ox2. L ankle pain. L ankle up on wedge. Up with assist of 2. WBAT. 2g Na diet. Scheduled tylenol for pain, PRN dilaudid given x1. Jeaneth for retention. 1 large BM during shift. On 3 L O2. Confused and restless throughout shift. PIV SL.

## 2020-01-03 NOTE — PROGRESS NOTES
D: KATERIN following for discharge planning.   I: KATERIN called [facility] and received prior-auth for pt to go to TCU. Auth #[id number], dates 1/3-1/12/20. KATERIN updated dtr Anne who is in agreement. HE WC ride set for 1800. Anne aware pt will be billed for the cost of the ride. Portable O2 tank ordered from [facility]. Orders faxed. Anne approved the $75 fee for the portable tank. KATERIN informed Anne of the $45 private room fee at [facility]. Orders faxed to facility and Sandra at [facility] updated. Nursing aware.   P: Discharge.    ARGENTINA Zaldivar, LGSW  [phone]  [facility]        PAS-RR    D: Per DHS regulation, SW completed and submitted PAS-RR to [place] [employer] Direct Connect via the Senior LinkAge Line.  PAS-RR confirmation # is : [id number]    I: KATERIN spoke with pt and they are aware a PAS-RR has been submitted.  KATERIN reviewed with pt that they may be contacted for a follow up appointment within 10 days of hospital discharge if their SNF stay is < 30 days.  Contact information for Senior LinkAge Line was also provided.    A: Pt verbalized understanding.    P: Further questions may be directed to [facility] LinkAge Line at #[phone], option #4 for PAS-RR staff.

## 2020-01-03 NOTE — DISCHARGE SUMMARY
[facility]    Discharge Summary  Hospitalist    Date of Admission:  12/31/2019  Date of Discharge:  1/3/2020  Discharging Provider: Arley Krishnan MD  Date of Service (when I saw the patient): 01/03/20    Discharge Diagnoses   Left foot pain    History of Present Illness   Renato Mccartney is an 87 year old male who presented with ongoing left foot pain on 12/31 to the ED (third visit to the ED for the same issue since a fall on 12/24). XR negative for fracture except for a L 5th toe age-indeterminate nondisplaced fracture. CT not conclusive. MRI negative for fracture    Hospital Course   Renato Mccartney was admitted on 12/31/2019.  The following problems were addressed during his hospitalization:    L foot pain  Elevated CK  Presented to ED on 12/31 for 3rd time w/ ongoing L foot pain. XRays negative for fracture (except for a left 5th toe age-indeterminate nondisplaced fracture), CT non-conclusive - there's a possible L metatarsal injury that's not fully visualized on CT imaging. CK 1k in ED (was 77 on 12/29). WBC 20 in ED (was 5.8 on 12/29). Orthopedics evaluated pt and thought the exam was not consistent w/ compartment syndrome. Lower extremity US negative for DVT.  - CK was elevated to 1.5k on 1/1 likely b/c injury from fall, number too low for rhabdomyolysis. CK improving to 800s on 01/02/20.   - PRN PO & IV Dilaudid, q8h Tylenol  - Per Orthopedics, recommend mobilize w/ PT/OT, continue left LE elevation, WBAT.    Delirium  Pt called daughter on phone on 01/02/20 afternoon, and per daughter he was not making sense, talking nonsense. I examined pt at bedside, he was a&o x4, and appeared baseline. Symptoms consistent w/ delirium likely due to a combination of pain, immobility, and hospitalization.  - frequent reorientation     DM2 reasonably controlled w/ A1C 8.2% as of 04/2019 , without primary care, though compliant with metformin and glucose checks.   - Check A1c --> 6.6% 12/31/19.   - daily AM  glucose     Leukocytosis  Suspect this is related to trauma/LLE injury as he denies any infectious type of history. SOFA score is 0.  SIRS is 1 for leukocytosis. No concern for sepsis. WBC improved to 13.3 on 01/01/20. CXR not consistent w/ PNA and pt w/o cough, respiratory symptoms.    Oxygen Desaturation  Pt requiring supplemental O2 at 2L NC and desats to 80s when on room air. CXR not consistent w/ infection although with slight opacities concerning for volume overload, especially in setting of pt receiving IVF and Lasix being held. May also be exacerbated by pt's weakness and deconditioning.  - resume PTA Lasix 20 mg/day  - NC, wean as tolerated    Hypertension.   Mild AS.   PAF  - Continue PTA metoprolol  - hold acei & lasix until BP more robust - resumed at discharge  - Continue PTA apixaban     Groin intertrigo, likely with cutaneous yeast.     - We will consult Wound Care ostomy nurse.      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 No worsening Cr despite elevated CK.  - continue PTA flomax     History of stroke, ischemic w/ residual L hemiparesis.   - Continue PTA apixaban.      Falls, recurrent  - consult PT     Bowel prophylaxis  - schedule bowel regimen while on opioids          Arley Krishnan MD    Significant Results and Procedures   See below    Pending Results   These results will be followed up by PCP    Unresulted Labs Ordered in the Past 30 Days of this Admission     Date and Time Order Name Status Description    1/2/2020 0645 Ferritin In process           Code Status   Full Code       Primary Care Physician   Diane Falcon    General: well-appearing, NAD, A&O x4, comfortable  HEENT: NC/AT, moist mucus membranes  Heart: irregularly irregular w/ 3/6 murmur best heart at left sternal border  Lungs: CTAB no crackles or wheezes  Abdomen: NABS, non-tender, non-distended; no rebound or guarding; no HSM or masses.  Extremities: warm, well-perfused. Bilateral ankle ecchymoses L>R  w/ trace bilateral ankle edema. Bottom of L foot and L heel diffusely tender to palpation.  Neuro: CN II-XII intact, moving extremities spontaneously      Discharge Disposition   Discharged to short-term care facility  Condition at discharge: Stable    Consultations This Hospital Stay   PHYSICAL THERAPY ADULT IP CONSULT  WOUND OSTOMY CONTINENCE NURSE  IP CONSULT  ORTHOPEDICS IP CONSULT  CARE TRANSITION RN/SW IP CONSULT  PHYSICAL THERAPY ADULT IP CONSULT    Time Spent on this Encounter   I, Arley Krishnan MD, personally saw the patient today and spent greater than 30 minutes discharging this patient.    Discharge Orders      General info for SNF    Length of Stay Estimate: Short Term Care: Estimated # of Days <30  Condition at Discharge: Stable  Level of care:skilled   Rehabilitation Potential: Good  Admission H&P remains valid and up-to-date: Yes  Recent Chemotherapy: N/A  Use Nursing Home Standing Orders: Yes     Mantoux instructions    Give two-step Mantoux (PPD) Per Facility Policy Yes     Reason for your hospital stay    Left foot pain     Activity - Up with nursing assistance     Follow Up and recommended labs and tests    Follow up with Nursing home physician.  No follow up labs or test are needed.     Physical Therapy Adult Consult    Evaluate and treat as clinically indicated.    Reason:  Deconditioning, left foot pain.     Oxygen - Nasal cannula    2 Lpm by nasal cannula, OK to titrate up to 6 L as needed to keep O2 sats 92% or greater.     Advance Diet as Tolerated    Follow this diet upon discharge: Orders Placed This Encounter      Room Service      Room Service      Combination Diet Regular Diet Adult; 2 gm NA Diet     Discharge Medications   Current Discharge Medication List      START taking these medications    Details   HYDROmorphone (DILAUDID) 2 MG tablet Take 0.5 tablets (1 mg) by mouth every 4 hours as needed for moderate to severe pain  Qty: 10 tablet, Refills: 0    Associated Diagnoses: Pain in  left foot         CONTINUE these medications which have NOT CHANGED    Details   apixaban ANTICOAGULANT (ELIQUIS) 5 MG tablet Take 1 tablet (5 mg) by mouth 2 times daily    Associated Diagnoses: Permanent atrial fibrillation      atorvastatin (LIPITOR) 20 MG tablet Take 20 mg by mouth At Bedtime       cholecalciferol 1000 units TABS Take 2,000 Units by mouth daily       furosemide (LASIX) 20 MG tablet Take 20 mg by mouth daily      lisinopril (PRINIVIL/ZESTRIL) 20 MG tablet Take 20 mg by mouth daily      melatonin 3 MG tablet Take 3 mg by mouth At Bedtime      metFORMIN (GLUCOPHAGE) 500 MG tablet Take 500 mg by mouth 2 times daily (with meals)      metoprolol succinate ER (TOPROL-XL) 25 MG 24 hr tablet Take 0.5 tablets (12.5 mg) by mouth daily    Associated Diagnoses: Permanent atrial fibrillation      tamsulosin (FLOMAX) 0.4 MG capsule Take 1 capsule (0.4 mg) by mouth daily    Associated Diagnoses: Benign prostatic hyperplasia with nocturia         STOP taking these medications       oxyCODONE (ROXICODONE) 5 MG tablet Comments:   Reason for Stopping:             Allergies   No Known Allergies  Data   Most Recent 3 CBC's:  Recent Labs   Lab Test 01/02/20  0645 01/01/20  0705 12/31/19  0625 12/29/19  1025   WBC  --  13.3* 20.3* 5.8   HGB 9.4* 9.6* 10.5* 10.1*   MCV  --  100 98 98   PLT  --  186 225 206      Most Recent 3 BMP's:  Recent Labs   Lab Test 01/02/20  0645 01/01/20  0705 12/31/19  0625 12/29/19  1025   NA  --  141 141 143   POTASSIUM  --  4.4 4.6 4.4   CHLORIDE  --  111* 109 112*   CO2  --  23 21 24   BUN  --  42* 36* 35*   CR 1.30* 1.29* 1.37* 1.26*   ANIONGAP  --  7 11 7   JESU  --  8.5 9.5 9.1   GLC  --  105* 147* 117*     Most Recent 2 LFT's:No lab results found.  Most Recent INR's and Anticoagulation Dosing History:  Anticoagulation Dose History     Recent Dosing and Labs Latest Ref Rng & Units 12/29/2019    INR 0.86 - 1.14 1.60(H)        Most Recent Cholesterol Panel:No lab results found.  Most Recent  6 Bacteria Isolates From Any Culture (See EPIC Reports for Culture Details):  Recent Labs   Lab Test 04/11/19  1451   CULT >100,000 colonies/mL  Escherichia coli  *  10,000 to 50,000 colonies/mL  Strain 2  Escherichia coli  *     Most Recent TSH, T4 and A1c Labs:  Recent Labs   Lab Test 12/31/19  0625 04/11/19  1459   TSH  --  0.40   A1C 6.6*  --      Results for orders placed or performed during the hospital encounter of 12/31/19   CT Ankle Left w/o Contrast    Narrative    CT LEFT ANKLE WITHOUT CONTRAST;   CT LEFT FOOT WITHOUT CONTRAST 12/31/2019 9:02 AM    HISTORY: Left foot and ankle pain after an injury 8 days ago. Negative  x-rays.    TECHNIQUE: Helical axial scans with sagittal and coronal  reconstructions. Radiation dose for this scan was reduced using  automated exposure control, adjustment of the mA and/or kV according  to patient size, or iterative reconstruction technique.    COMPARISON: None.    FINDINGS:   Left ankle: There is bony demineralization. No acute bony abnormality.  Mild narrowing of the posterior tibiotalar joint is likely related to  degenerative cartilage loss. Posterior subtalar joint shows no  significant narrowing. There is some degenerative change at the  talonavicular and calcaneocuboid joints. Multiple tiny foci of  calcification projecting in the tibiotalar joint may represent  chondrocalcinosis and/or loose bodies. Multiple tiny foci of  calcification or ossification inferior to the medial and lateral  malleoli are likely related to old injuries. There is no evidence for  talar dome osteochondral fracture. The anterior process of the  calcaneus and tarsal navicular are closely opposed to one another  which could indicate fibrous or cartilaginous coalition (image 25 of  series 6). However, lack of sclerotic change and deformity of this  area argues against coalition. Diffuse subcutaneous edema. The  remainder of the soft tissues about the ankle appear normal to the  extent  visualized by CT.    Left foot: Bony demineralization. There is some motion artifact  resulting in apparent cortical offset in the second through fifth  metatarsal mid diaphyses. It is difficult to completely exclude  fractures in these regions. If the patient has pain related to the  metatarsal region, an MRI exam would be more definitive. Mild  scattered degenerative changes in the midfoot. Motion artifacts  compromise evaluation of the great toe. There is a nondisplaced  oblique fracture through the distal aspect of the proximal phalanx of  the little toe (image 43 of series 8). However, this is of uncertain  age and could be a chronic partially united fracture. Clinical  correlation for pain in this region is recommended. Mild diffuse  subcutaneous edema. No other significant soft tissue abnormality to  the extent visualized by CT.      Impression    IMPRESSION:  1. Motion artifact makes it difficult to exclude nondisplaced  fractures in the metatarsal diaphyses. If the patient has pain in this  region, an MRI exam would be more definitive.  2. Nondisplaced fracture of uncertain age involving the distal aspect  of the proximal phalanx of the little toe.  3. Generalized bony demineralization.  4. Diffuse subcutaneous edema about the ankle and foot.  5. Scattered chondrocalcinosis versus tiny loose bodies related to the  tibiotalar joint.  6. 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    SANDY SEXTON MD   CT Foot Left w/o Contrast    Narrative    CT LEFT ANKLE WITHOUT CONTRAST;   CT LEFT FOOT WITHOUT CONTRAST 12/31/2019 9:02 AM    HISTORY: Left foot and ankle pain after an injury 8 days ago. Negative  x-rays.    TECHNIQUE: Helical axial scans with sagittal and coronal  reconstructions. Radiation dose for this scan was reduced using  automated exposure control, adjustment of the mA and/or kV according  to patient size, or iterative reconstruction technique.    COMPARISON: None.    FINDINGS:    Left ankle: There is bony demineralization. No acute bony abnormality.  Mild narrowing of the posterior tibiotalar joint is likely related to  degenerative cartilage loss. Posterior subtalar joint shows no  significant narrowing. There is some degenerative change at the  talonavicular and calcaneocuboid joints. Multiple tiny foci of  calcification projecting in the tibiotalar joint may represent  chondrocalcinosis and/or loose bodies. Multiple tiny foci of  calcification or ossification inferior to the medial and lateral  malleoli are likely related to old injuries. There is no evidence for  talar dome osteochondral fracture. The anterior process of the  calcaneus and tarsal navicular are closely opposed to one another  which could indicate fibrous or cartilaginous coalition (image 25 of  series 6). However, lack of sclerotic change and deformity of this  area argues against coalition. Diffuse subcutaneous edema. The  remainder of the soft tissues about the ankle appear normal to the  extent visualized by CT.    Left foot: Bony demineralization. There is some motion artifact  resulting in apparent cortical offset in the second through fifth  metatarsal mid diaphyses. It is difficult to completely exclude  fractures in these regions. If the patient has pain related to the  metatarsal region, an MRI exam would be more definitive. Mild  scattered degenerative changes in the midfoot. Motion artifacts  compromise evaluation of the great toe. There is a nondisplaced  oblique fracture through the distal aspect of the proximal phalanx of  the little toe (image 43 of series 8). However, this is of uncertain  age and could be a chronic partially united fracture. Clinical  correlation for pain in this region is recommended. Mild diffuse  subcutaneous edema. No other significant soft tissue abnormality to  the extent visualized by CT.      Impression    IMPRESSION:  1. Motion artifact makes it difficult to exclude  nondisplaced  fractures in the metatarsal diaphyses. If the patient has pain in this  region, an MRI exam would be more definitive.  2. Nondisplaced fracture of uncertain age involving the distal aspect  of the proximal phalanx of the little toe.  3. Generalized bony demineralization.  4. Diffuse subcutaneous edema about the ankle and foot.  5. Scattered chondrocalcinosis versus tiny loose bodies related to the  tibiotalar joint.  6. 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    SANDY SEXTON MD   MR Foot Left w/o Contrast    Narrative    MR LEFT FOOT WITHOUT CONTRAST 12/31/2019 10:49 PM    HISTORY: Injury with pain.     COMPARISON: CT scan from 12/31/2019.    TECHNIQUE: Routine multiplanar, multisequence imaging was performed.     FINDINGS:   No bone marrow edema or evidence of fracture of the metatarsals or  forefoot. There is a small focus of subchondral edema in the lateral  aspect of the talar dome that may be a small bone contusion or small  area of chronic osteochondritis dissecans with slight irregularity of  overlying articular cartilage. Small focus of increased signal in the  cuboid at the calcaneocuboid joint likely a subchondral cyst.    There is somewhat diffuse edema of the plantar musculature of the  forefoot. This could be related to muscle strain or myositis. No  abscess. No joint effusion. No tendon abnormalities.      Impression    IMPRESSION:  1. No evidence of acute fracture.  2. Small focus of subchondral edema in the lateral corner of the talar  dome that could represent bone contusion or small chronic focus of  avascular necrosis. Mild irregularity of overlying articular  cartilage.  3. Somewhat diffuse edema of the plantar musculature of the foot may  be related to muscle strain or myositis.  4. Probable subchondral cyst in the cuboid at the calcaneocuboid  joint.  5. This study was presented to me at 9:17 PM today. I see no evidence  of a preliminary report.    COLTEN  MD COLTEN THIBODEAUX MD   US Lower Extremity Venous Duplex Bilateral    Narrative    VENOUS ULTRASOUND BILATERAL LOWER EXTREMITIES  12/31/2019 2:00 PM     HISTORY: Rule out DVT. Pain, fall, off of anticoagulation for 1-2  days.    COMPARISON: None.    TECHNIQUE: Color Doppler and spectral waveform analysis obtained  throughout the deep veins of both lower extremities.    FINDINGS: Both common femoral, proximal greater saphenous, femoral,  and popliteal veins demonstrate normal blood flow, compression, and  augmentation. Hypoechoic collection in the right popliteal fossa is  1.7 x 3.1 x 1.4 cm. No internal blood flow. Hypoechoic collection in  the left popliteal fossa is 4.4 x 1.6 x 4 cm. Also no internal blood  flow. Posterior tibial and peroneal veins are patent.      Impression    IMPRESSION:  1. Negative for deep venous thrombosis throughout both lower  extremities.  2. Bilateral Berger's cysts.    SRAVANI DONALDSON MD   XR Chest 2 Views    Narrative    CHEST TWO VIEWS 1/3/2020 10:20 AM     HISTORY: Patient with worsening wet cough per nurse, been more  confused than baseline x1 day. Evaluate for pneumonia.    COMPARISON: April 12, 2019       Impression    IMPRESSION: Small bilateral pleural effusions and associated  atelectasis and/or infiltrates. Upper lungs clear. The cardiac  silhouette is not enlarged. Pulmonary vasculature is unremarkable.    KERI MICHELLE MD

## 2020-01-03 NOTE — PROGRESS NOTES
Orthopedic Surgery  Renato Mccartney  1/3/2020  Admit Date:  2019  Left LE hematoma     Patient resting in bed, states pain is tolerable today  Tolerating oral intake.    Denies nausea or vomiting  Denies chest pain or shortness of breath  No events overnight.      Alert and orient to person but confused today  Vital Sign Ranges  Temperature Temp  Av.9  F (36.6  C)  Min: 97.4  F (36.3  C)  Max: 98.2  F (36.8  C)   Blood pressure Systolic (24hrs), Av , Min:100 , Max:121         Diastolic (24hrs), Av, Min:54, Max:64      Pulse No data recorded   Respirations Resp  Av  Min: 16  Max: 16   Pulse oximetry SpO2  Av.8 %  Min: 97 %  Max: 98 %       No LE swelling on exam  Moderate ecchymosis   Minimal erythema of the surrounding skin.   Bilateral calves are soft, non-tender.  Bilateral lower extremity is NVI.  Sensation intact bilateral lower extremities  5/5 motor with resisted dorsi and plantar flexion bilaterally  Cap refill   +Dp pulse     Labs:        Recent Labs   Lab Test 20  0705 19  0625 19  1025   POTASSIUM 4.4 4.6 4.4            Recent Labs   Lab Test 20  0645 20  0705 19  0625   HGB 9.4* 9.6* 10.5*          Recent Labs   Lab Test 19  1025   INR 1.60*            Recent Labs   Lab Test 20  0705 19  0625 19  1025    225 206         A/P  1. Left LE hematoma and foot pain              Continue DVT prophylaxis per hospitalist.                Mobilize with PT/OT               WBAT.              Continue Left LE elevation                 Continue current pain regiment.     2. Disposition              Anticipate d/c to TCU based on medical clearance and bed placement - ok to discharge per ortho.     Khalida Queen PA-C

## 2020-01-03 NOTE — PLAN OF CARE
Pt alert and oriented x's 4, but delirius at times with garbled speech.  Taking scheduled tylenol for pain.  More sleepy this afternoon, requiring 3 liters of oxygen per nasal cannula.  Up with max assist of 2.  Continue beauchamp catheter.  Pt as wet non-productive cough, md aware, see chest x-ray completed.  Will continue to monitor.

## 2020-01-03 NOTE — PROGRESS NOTES
Called back, Rey, pt's daughter who was concerned w/ patient's confused. Reassured that pt's is redirectable and not agitated or combative. Pt is discharging to [facility] @ 1800.

## 2020-01-03 NOTE — PLAN OF CARE
Discharge Planner PT   Patient plan for discharge: did not state  Current status: Supine in bed upon arrival. Pt initially requesting to rest, agreeable to get up to chair for breakfast. Time needed for room set up for safe mobility. MaxAx2 Supine>sit with HOB elevated. Posterior lean sitting EOB, needing ModAx1-2 to maintain upright sitting. Scooted forward with MinAx2, improved sitting balance with feet on floor, needing continued CGA x2. Sit>stand with FWW and ModA x2 with continued L lean throughout. Needs asssit to place and keep L hand on walker. Attempted to side step R at EOB, pt with minimal foot movement, returned to sitting at EOB. Bed>chair with FWW and MaxA x2, L lean throughout, difficulty with stepping, more pivoting. 1 WC pushup with ModA to place chair alarm pad. Pt very fatigued post transfer. RN updated.    Recommend Ax2 lift transfer bed<>chair with nursing.     Barriers to return to prior living situation: Lives alone, high fall risk, pain, unable to tolerate gait  Recommendations for discharge: TCU   Rationale for recommendations: Pt will benefit from continued skilled PT at TCU to improve strength, balance, gait, endurance to improve functional mobility prior to return home.       Entered by: Anne Shelby 01/03/2020 9:56 AM

## 2020-01-04 NOTE — PROGRESS NOTES
Patient with episode of hypoxia overnight with SaO2 levels down into the 80 s requiring placement of Oxymyzer- improved with one time dose of albuterol nebs, now on 2lpm vi NC with SaO2 levels in mid 90 s (this is improved from when discharged from hospital)    Orders:  Monitor  Duonebs q4h PRN Hypoxia/SOB

## 2020-01-04 NOTE — PLAN OF CARE
([id number]) Pt is A&O, forgetful @ times. Rating pain 3/10, refused pain meds. Attempted to wean off O2, no success.  Mepilex on buttock, CDI. Smear BM x1, beauchamp bag replaced. Refused to wear clothes. IV access discontinued. Updated daughter that pt is discharging @ 1800 to [facility] via wheelchair and O2 on 3LNC. Discharge pt w/ most of his belonging.  Pt left his jacket, hat and life alert @ hospital, Notified [facility] and will be sent tomorrow.

## 2020-01-04 NOTE — PLAN OF CARE
Physical Therapy Discharge Summary    Reason for therapy discharge:    Discharged to transitional care facility.    Progress towards therapy goal(s). See goals on Care Plan in [facility] electronic health record for goal details.  Goals not met.  Barriers to achieving goals:   discharge from facility.    Therapy recommendation(s):    Continued therapy is recommended.  Rationale/Recommendations:  to progress independence with functional mobility.

## 2020-01-05 NOTE — PROGRESS NOTES
[place] [facility]  PRIMARY CARE PROVIDER AND CLINIC:  Diane Falcon NP, [place] [address] [place] [address] / [facility] *  Chief Complaint   Patient presents with     Hospital F/U     [facility] Medical Record Number:  [id number]  Place of Service where encounter took place:  [facility] ([facility]) [[id number]]    Renato Mccartney  is a 87 year old  (11/19/1932), PMH of HTN, PAF, CHF, AS, DMII, HLD, BPH and CVD with left hemiparesis who presents to ED for 3 time with left foot pain  admitted to the above facility from  [facility]. Hospital stay 12/31/19 through 1/3/20..  Admitted to this facility for  rehab, medical management and nursing care.    HPI:    HPI information obtained from: facility chart records, facility staff, patient report and [facility] chart review.   Brief Summary of Hospital Course:   Left foot pain: x-ray, CT scan negative for Fx: CK elevated but trended down, ortho consulted and recommend mobilize.   Delerium: likely r/t pain medications  CHF: lasix held initially due to soft BP, resumed, CXR shows some question for fluid overload, patient did require supplemental O2  CKD: baseline creat 1.1-1.5, follow  Updates on Status Since Skilled nursing Admission: ON exam today patient sitting up in WC, alert, states he is very tired, fatigue, he is on O2 states he has a non productive cough and occasional SOB, denies fever, chills, cp, palpitations, N/V/D or constipation, states he has ongoing left foot pain that is a dull achy pain rates as 6/10.      CODE STATUS/ADVANCE DIRECTIVES DISCUSSION:   DNR / DNI  Patient's living condition: lives alone  ALLERGIES: Patient has no known allergies.  PAST MEDICAL HISTORY:  has a past medical history of Cerebral infarction (H) (2004), Detached retina, left, DM2 (diabetes mellitus, type 2) (H) (2000), Hypertension, and Lymphoma (2008).  PAST SURGICAL HISTORY:   has a past surgical history that includes appendectomy (1949);  "back surgery (1954); and Eye surgery.  FAMILY HISTORY: family history includes Abdominal Aortic Aneurysm in his mother; Myocardial Infarction in his father.  SOCIAL HISTORY:   reports that he quit smoking about 40 years ago. He has never used smokeless tobacco. He reports current alcohol use. He reports that he does not use drugs.    Post Discharge Medication Reconciliation Status: discharge medications reconciled, continue medications without change    Current Outpatient Medications   Medication Sig Dispense Refill     apixaban ANTICOAGULANT (ELIQUIS) 5 MG tablet Take 1 tablet (5 mg) by mouth 2 times daily       atorvastatin (LIPITOR) 20 MG tablet Take 20 mg by mouth At Bedtime        cholecalciferol 1000 units TABS Take 2,000 Units by mouth daily        furosemide (LASIX) 20 MG tablet Take 20 mg by mouth daily       HYDROmorphone (DILAUDID) 2 MG tablet Take 0.5 tablets (1 mg) by mouth every 4 hours as needed for moderate to severe pain 10 tablet 0     ipratropium - albuterol 0.5 mg/2.5 mg/3 mL (DUONEB) 0.5-2.5 (3) MG/3ML neb solution Take 1 vial by nebulization every 4 hours as needed for shortness of breath / dyspnea or wheezing       lisinopril (PRINIVIL/ZESTRIL) 20 MG tablet Take 20 mg by mouth daily       melatonin 3 MG tablet Take 3 mg by mouth At Bedtime       metFORMIN (GLUCOPHAGE) 500 MG tablet Take 500 mg by mouth 2 times daily (with meals)       metoprolol succinate ER (TOPROL-XL) 25 MG 24 hr tablet Take 0.5 tablets (12.5 mg) by mouth daily       tamsulosin (FLOMAX) 0.4 MG capsule Take 1 capsule (0.4 mg) by mouth daily         ROS:  10 point ROS of systems including Constitutional, Eyes, Respiratory, Cardiovascular, Gastroenterology, Genitourinary, Integumentary, Musculoskeletal, Psychiatric were all negative except for pertinent positives noted in my HPI.    Vitals:  BP (!) 153/101   Pulse 56   Temp 98.6  F (37  C)   Resp 20   Ht 1.778 m (5' 10\")   Wt 84.4 kg (186 lb)   SpO2 93%   BMI 26.69 kg/m  "   Exam:  GENERAL APPEARANCE:  Alert, in no distress  ENT:  Mouth and posterior oropharynx normal, moist mucous membranes, [place]  EYES:  EOM, conjunctivae, lids, pupils and irises normal, PERRL  RESP:  respiratory effort and palpation of chest normal, lungs clear to auscultation , no respiratory distress, diminished breath sounds bases bilaterally, no adventitious sounds appreciated  CV:  Palpation and auscultation of heart done , regular rate and rhythm, no murmur, rub, or gallop, peripheral edema 1+ in LE bilaterally  ABDOMEN:  normal bowel sounds, soft, nontender, no hepatosplenomegaly or other masses  M/S:   Examination of:   right upper extremity, left upper extremity, right lower extremity and left lower extremity  Inspection, ROM, stability and muscle strength normal  SKIN:  Inspection of skin and subcutaneous tissue baseline  NEURO:   Cranial nerves 2-12 are normal tested and grossly at patient's baseline, speech WNL  PSYCH:  affect and mood normal    Lab/Diagnostic data:  Recent labs in [facility] reviewed by me today.     ASSESSMENT/PLAN:  Left foot pain  Physical deconditioning  Acute/ongoing: PT and OT for strengthening, schedule tylenol 1000mg TID, continue dilaudid 1mg q 4 hours prn    Hypoxia:   Acute: IS QID and prn, mucinex 600mg q 12 hours for 5 day, wean off O2 keep SAO2 > 90%, monitor SaO2 at rest and with activity    Acute diastolic congestive heart failure (H)  Acute: daily weights, lasix 20mg QD, followed by [facility]    Essential hypertension  Persistent atrial fibrillation  Acute/ongoing: vitals daily and prn, BMP follow, continue toprol xl   12.5mg QD, lisinopril 20mg QD, apixaban 5mg BID    Type 2 diabetes mellitus with other diabetic kidney complication (H)  CKD (chronic kidney disease) stage 3, GFR 30-59 ml/min (H)  Ongoing: blood sugar monitoring BID, metoformin 500mg BID, follow       Orders written by provider at facility  BMP and CBC on Wednesday  Tylenol 1000mg TID scheduled and qhs  prn  IS QID and prn  mucinex 600mg q 12 hours or 5 days    Total time spent with patient visit at the [facility] nursing [facility] was 45 min including patient visit and review of past records. Greater than 50% of total time spent with counseling and coordinating care due to discussed pain medications, pain control, will start tylenol scheduled, will monitor cough/congestion, wean off O2, start mucinex, discussed POC, medications and therapy. .     Electronically signed by:  Tonya Lynn Haase, APRN CNP

## 2020-01-06 NOTE — PROGRESS NOTES
SW:  D:  Spoke with patient's discharging nursing on 1-4-19 regarding the portable oxygen that was not delivered prior to patient's discharge.  Call placed to  Respiratory and spoke with Marta in billing to update them that patient never received the oxygen that was delivered.  Explained that patient should not be billed the $75 charge for the oxygen as he never received it.  Marta states that he will not be billed for the oxygen.    ARGENTINA Menchaca, [facility] CareSW  Lead   [phone]  [facility]

## 2020-01-06 NOTE — LETTER
1/6/2020        RE: Renato Mccartney  [address]  [facility] [address]        [place] [facility]  PRIMARY CARE PROVIDER AND CLINIC:  D[facility] Falcon NP, [facility] [address] [place] [address] / [facility] *  Chief Complaint   Patient presents with     Hospital F/U     [facility] Record Number:  [id number]  Place of Service where encounter took place:  [facility] ([facility]) [[id number]]    Renato Mccartney  is a 87 year old  (11/19/1932), PMH of HTN, PAF, CHF, AS, DMII, HLD, BPH and CVD with left hemiparesis who presents to ED for 3 time with left foot pain  admitted to the above facility from  [facility]. Hospital stay 12/31/19 through 1/3/20..  Admitted to this facility for  rehab, medical management and nursing care.    HPI:    HPI information obtained from: facility chart records, facility staff, patient report and [facility] chart review.   Brief Summary of Hospital Course:   Left foot pain: x-ray, CT scan negative for Fx: CK elevated but trended down, ortho consulted and recommend mobilize.   Delerium: likely r/t pain medications  CHF: lasix held initially due to soft BP, resumed, CXR shows some question for fluid overload, patient did require supplemental O2  CKD: baseline creat 1.1-1.5, follow  Updates on Status Since Skilled nursing Admission: ON exam today patient sitting up in WC, alert, states he is very tired, fatigue, he is on O2 states he has a non productive cough and occasional SOB, denies fever, chills, cp, palpitations, N/V/D or constipation, states he has ongoing left foot pain that is a dull achy pain rates as 6/10.      CODE STATUS/ADVANCE DIRECTIVES DISCUSSION:   DNR / DNI  Patient's living condition: lives alone  ALLERGIES: Patient has no known allergies.  PAST MEDICAL HISTORY:  has a past medical history of Cerebral infarction (H) (2004), Detached retina, left, DM2 (diabetes mellitus, type 2) (H) (2000), Hypertension, and Lymphoma  (2008).  PAST SURGICAL HISTORY:   has a past surgical history that includes appendectomy (1949); back surgery (1954); and Eye surgery.  FAMILY HISTORY: family history includes Abdominal Aortic Aneurysm in his mother; Myocardial Infarction in his father.  SOCIAL HISTORY:   reports that he quit smoking about 40 years ago. He has never used smokeless tobacco. He reports current alcohol use. He reports that he does not use drugs.    Post Discharge Medication Reconciliation Status: discharge medications reconciled, continue medications without change    Current Outpatient Medications   Medication Sig Dispense Refill     apixaban ANTICOAGULANT (ELIQUIS) 5 MG tablet Take 1 tablet (5 mg) by mouth 2 times daily       atorvastatin (LIPITOR) 20 MG tablet Take 20 mg by mouth At Bedtime        cholecalciferol 1000 units TABS Take 2,000 Units by mouth daily        furosemide (LASIX) 20 MG tablet Take 20 mg by mouth daily       HYDROmorphone (DILAUDID) 2 MG tablet Take 0.5 tablets (1 mg) by mouth every 4 hours as needed for moderate to severe pain 10 tablet 0     ipratropium - albuterol 0.5 mg/2.5 mg/3 mL (DUONEB) 0.5-2.5 (3) MG/3ML neb solution Take 1 vial by nebulization every 4 hours as needed for shortness of breath / dyspnea or wheezing       lisinopril (PRINIVIL/ZESTRIL) 20 MG tablet Take 20 mg by mouth daily       melatonin 3 MG tablet Take 3 mg by mouth At Bedtime       metFORMIN (GLUCOPHAGE) 500 MG tablet Take 500 mg by mouth 2 times daily (with meals)       metoprolol succinate ER (TOPROL-XL) 25 MG 24 hr tablet Take 0.5 tablets (12.5 mg) by mouth daily       tamsulosin (FLOMAX) 0.4 MG capsule Take 1 capsule (0.4 mg) by mouth daily         ROS:  10 point ROS of systems including Constitutional, Eyes, Respiratory, Cardiovascular, Gastroenterology, Genitourinary, Integumentary, Musculoskeletal, Psychiatric were all negative except for pertinent positives noted in my HPI.    Vitals:  BP (!) 153/101   Pulse 56   Temp 98.6  " F (37  C)   Resp 20   Ht 1.778 m (5' 10\")   Wt 84.4 kg (186 lb)   SpO2 93%   BMI 26.69 kg/m     Exam:  GENERAL APPEARANCE:  Alert, in no distress  ENT:  Mouth and posterior oropharynx normal, moist mucous membranes, [place]  EYES:  EOM, conjunctivae, lids, pupils and irises normal, PERRL  RESP:  respiratory effort and palpation of chest normal, lungs clear to auscultation , no respiratory distress, diminished breath sounds bases bilaterally, no adventitious sounds appreciated  CV:  Palpation and auscultation of heart done , regular rate and rhythm, no murmur, rub, or gallop, peripheral edema 1+ in LE bilaterally  ABDOMEN:  normal bowel sounds, soft, nontender, no hepatosplenomegaly or other masses  M/S:   Examination of:   right upper extremity, left upper extremity, right lower extremity and left lower extremity  Inspection, ROM, stability and muscle strength normal  SKIN:  Inspection of skin and subcutaneous tissue baseline  NEURO:   Cranial nerves 2-12 are normal tested and grossly at patient's baseline, speech WNL  PSYCH:  affect and mood normal    Lab/Diagnostic data:  Recent labs in [facility] reviewed by me today.     ASSESSMENT/PLAN:  Left foot pain  Physical deconditioning  Acute/ongoing: PT and OT for strengthening, schedule tylenol 1000mg TID, continue dilaudid 1mg q 4 hours prn    Hypoxia:   Acute: IS QID and prn, mucinex 600mg q 12 hours for 5 day, wean off O2 keep SAO2 > 90%, monitor SaO2 at rest and with activity    Acute diastolic congestive heart failure (H)  Acute: daily weights, lasix 20mg QD, followed by [facility]    Essential hypertension  Persistent atrial fibrillation  Acute/ongoing: vitals daily and prn, BMP follow, continue toprol xl   12.5mg QD, lisinopril 20mg QD, apixaban 5mg BID    Type 2 diabetes mellitus with other diabetic kidney complication (H)  CKD (chronic kidney disease) stage 3, GFR 30-59 ml/min (H)  Ongoing: blood sugar monitoring BID, metoformin 500mg BID, follow       Orders " written by provider at facility  BMP and CBC on Wednesday  Tylenol 1000mg TID scheduled and qhs prn  IS QID and prn  mucinex 600mg q 12 hours or 5 days    Total time spent with patient visit at the [facility] nursing [facility] was 45 min including patient visit and review of past records. Greater than 50% of total time spent with counseling and coordinating care due to discussed pain medications, pain control, will start tylenol scheduled, will monitor cough/congestion, wean off O2, start mucinex, discussed POC, medications and therapy. .     Electronically signed by:  Tonya Lynn Haase, APRN CNP                       Sincerely,        Tonya Lynn Haase, APRN CNP

## 2020-01-09 NOTE — PROGRESS NOTES
"[place] [facility]  PRIMARY CARE PROVIDER AND CLINIC:  Diane Falcon NP, [facility] [address] [place] [address] / [facility] *    Patient was seen by Dr. Carmona at the [facility] on January 8, 2020, for a hospital follow-up visit.    Patient is a 87 year old  (11/19/1932), PMH of HTN, PAF on apixaban, diastolic CHF, mild AS, DMII, history of CVA with mild left hemiparesis, who was hospitalized at [facility] from 12/31/2019 through January 3, 2020 for the evaluation of left foot pain following injury on December 24, 2019    Hospital course was reviewed by me, is as per the hospital discharge summary and nurse practitioner note.    Etiology of persistent left foot pain unclear in view of multiple unremarkable x-rays. Pt was seen by orthopedics who recommended mobilization.    Hospital course was remarkable for leukocytosis (WBC 20 K, decreased to 13 K at time of discharge) of unclear etiology, but felt possibly secondary to acute injury, as well as mild hypoxia, with CXR revealing B pleural effusions, delirium felt secondary to pain medications.    Patient has a history of diastolic heart failure, at baseline receives Lasix 20 mg daily.  This was held during hospitalization secondary to relatively low blood pressures but resumed on discharge.  Patient was discharged to TCU on low-flow oxygen.     Patient reports continued left foot and ankle pain.  He notes difficulty walking secondary to foot pain.  He is clear that the pain started after his injury the day before [place].    He notes continued cough which is nonproductive.  He denies chest pain.  He has mild shortness of breath with activity.  He states \"I think I had the flu but I am feeling better \"he denies fevers, chills, difficulty swallowing         CODE STATUS/ADVANCE DIRECTIVES DISCUSSION:   DNR / DNI  Patient's living condition: lives alone  ALLERGIES: Patient has no known allergies.  PAST MEDICAL HISTORY:  has " a past medical history of Cerebral infarction (H) (2004), Detached retina, left, DM2 (diabetes mellitus, type 2) (H) (2000), Hypertension, and Lymphoma (2008).  PAST SURGICAL HISTORY:   has a past surgical history that includes appendectomy (1949); back surgery (1954); and Eye surgery.  FAMILY HISTORY: family history includes Abdominal Aortic Aneurysm in his mother; Myocardial Infarction in his father.  SOCIAL HISTORY:   reports that he quit smoking about 40 years ago. He has never used smokeless tobacco. He reports current alcohol use. He reports that he does not use drugs.      Current Outpatient Medications   Medication Sig Dispense Refill     acetaminophen (TYLENOL) 500 MG tablet Take 1,000 mg by mouth nightly as needed for mild pain       acetaminophen (TYLENOL) 500 MG tablet Take 1,000 mg by mouth 3 times daily       apixaban ANTICOAGULANT (ELIQUIS) 5 MG tablet Take 1 tablet (5 mg) by mouth 2 times daily       atorvastatin (LIPITOR) 20 MG tablet Take 20 mg by mouth At Bedtime        cholecalciferol 1000 units TABS Take 2,000 Units by mouth daily        furosemide (LASIX) 20 MG tablet Take 20 mg by mouth daily       guaiFENesin (MUCINEX) 600 MG 12 hr tablet Take 1,200 mg by mouth 2 times daily       HYDROmorphone (DILAUDID) 2 MG tablet Take 0.5 tablets (1 mg) by mouth every 4 hours as needed for moderate to severe pain 10 tablet 0     ipratropium - albuterol 0.5 mg/2.5 mg/3 mL (DUONEB) 0.5-2.5 (3) MG/3ML neb solution Take 1 vial by nebulization every 4 hours as needed for shortness of breath / dyspnea or wheezing       lisinopril (PRINIVIL/ZESTRIL) 20 MG tablet Take 20 mg by mouth daily       melatonin 3 MG tablet Take 3 mg by mouth At Bedtime       metFORMIN (GLUCOPHAGE) 500 MG tablet Take 500 mg by mouth 2 times daily (with meals)       metoprolol succinate ER (TOPROL-XL) 25 MG 24 hr tablet Take 0.5 tablets (12.5 mg) by mouth daily       tamsulosin (FLOMAX) 0.4 MG capsule Take 1 capsule (0.4 mg) by mouth  daily         ROS:  10 point ROS negative except as noted above.        Exam:  GENERAL APPEARANCE:  Alert, in no distress, lying in bed, wearing oxygen.  Occasional moist cough.  ENT: Oral mucosa moist  EYES: No eye redness  RESP: Respiratory rate 14.  No respiratory distress.  Scattered rhonchi bilaterally.  CV: Regular rate and rhythm,  systolic murmur.    1+ in LE bilaterally  ABDOMEN: Soft, nontender, nondistended.    M/S:   left foot and ankle are without erythema s or signs of trauma.  There is mild to moderate pain with palpation over the left ankle and sole of the left foot.  Strength in the left foot appears normal.  There is no posterior calf tenderness.    Gait was not assessed.    SKIN: No rash  NEURO: Alert, fully oriented.  Pleasant.  Face symmetric.  Speech normal.  No gross focal weakness as assessed with patient lying in bed.  PSYCH:  affect and mood normal        ASSESSMENT/PLAN:    Left foot pain, following fall.  Continued foot and ankle pain.  Multiple x-rays and orthopedic assessment unremarkable.  Plan: Continue to mobilize, per orthopedics.  Monitor foot exam and symptoms.  Low-dose Dilaudid in view of history of delirium during hospitalization. Orthopedics f/u if continued pain.    Hypoxia:   Acute, initially noted during hospitalization, felt to be related to exacerbation of diastolic heart failure based on exam and chest x-ray revealing bilateral pleural effusions.  Patient with continued cough, mild, stable hypoxia requiring oxygen.  Bronchospasm noted on exam.  Patient is in no distress.  Plan: Discussed with nurse practitioner.  Will increase Lasix to 40 mg daily, continue PRN nebs, repeat chest x-ray in a.m., monitor vitals.  Encourage incentive spirometry    Leukocytosis  Noted during the hospitalization, felt to be related to acute injury left foot and absence of signs or symptoms of infection.  Chest x-ray during hospitalization unremarkable.  Plan monitor vitals, foot exam,  CBC      Essential hypertension  Persistent atrial fibrillation  Stable on current medications including chronic apixaban.  Plan: Monitor blood pressure, BMP, continue current medications.      Type 2 diabetes mellitus with other diabetic kidney complication (H)  CKD (chronic kidney disease) stage 3, GFR 30-59 ml/min (H)  Stable on metformin  Plan: Continue metformin, monitor blood glucoses        Mendoza Carmona MD